# Patient Record
Sex: MALE | Race: WHITE | NOT HISPANIC OR LATINO | Employment: OTHER | URBAN - METROPOLITAN AREA
[De-identification: names, ages, dates, MRNs, and addresses within clinical notes are randomized per-mention and may not be internally consistent; named-entity substitution may affect disease eponyms.]

---

## 2017-05-28 ENCOUNTER — HOSPITAL ENCOUNTER (EMERGENCY)
Facility: CLINIC | Age: 82
Discharge: HOME | End: 2017-05-28
Attending: CLINICAL NURSE SPECIALIST | Admitting: CLINICAL NURSE SPECIALIST
Payer: MEDICARE

## 2017-05-28 VITALS
TEMPERATURE: 97.7 F | WEIGHT: 151 LBS | SYSTOLIC BLOOD PRESSURE: 156 MMHG | OXYGEN SATURATION: 97 % | BODY MASS INDEX: 22.36 KG/M2 | HEIGHT: 69 IN | HEART RATE: 77 BPM | DIASTOLIC BLOOD PRESSURE: 72 MMHG | RESPIRATION RATE: 20 BRPM
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
## 2017-05-28 PROCEDURE — 99282 EMERGENCY DEPT VISIT SF MDM: CPT

## 2017-05-28 RX ORDER — CEPHALEXIN 500 MG/1
500 CAPSULE ORAL 4 TIMES DAILY
Qty: 40 CAPSULE | Refills: 0 | Status: SHIPPED | OUTPATIENT
Start: 2017-05-28 | End: 2017-06-07

## 2017-05-28 ASSESSMENT — ENCOUNTER SYMPTOMS
NAUSEA: 0
FEVER: 0
WOUND: 1
VOMITING: 0

## 2017-05-28 NOTE — ED AVS SNAPSHOT
Emergency Department    [address] [place] [address]    Phone:  [phone]    Fax:  [phone]                                       Alec Boston Jr.   MRN: [id number]    Department:   Emergency Department   Date of Visit:  5/28/2017           After Visit Summary Signature Page     I have received my discharge instructions, and my questions have been answered. I have discussed any challenges I see with this plan with the nurse or doctor.    ..........................................................................................................................................  Patient/Patient Representative Signature      ..........................................................................................................................................  Patient Representative Print Name and Relationship to Patient    ..................................................               ................................................  Date                                            Time    ..........................................................................................................................................  Reviewed by Signature/Title    ...................................................              ..............................................  Date                                                            Time

## 2017-05-28 NOTE — ED PROVIDER NOTES
"  History     Chief Complaint:  Insect Bites     HPI   Alec Boston Jr. is a 82 year old male who presents to the emergency department today for evaluation of insect bites to upper extremities. Patient notes three different bug bites that he believes occurred yesterday and again today. One bite has become red and burst with drainage. Patient was recently working outside in the [employer] where he lives. Family friend expressed concern for tick bites and recommended that patient seek further ED evaluation. Patient states that wounds have actually improved in appearance and only itch intermittently. He denies fevers, nausea, or vomiting. No other concerns were voiced at this time.     Allergies:  No Known Drug Allergies    Medications:    The patient is currently on no regular medications.      Past Medical History:    Cholelithiasis  Glaucoma  Testicular cancer    Past Surgical History:    Orchiectomy  Limbal relaxing  Cholecystectomy  Laser selective trabeculoplasty    Family History:    History reviewed. No pertinent family history.     Social History:  The patient was accompanied to the ED by visitor.  Smoking Status: Negative  Alcohol Use: Positive  Marital Status:  Single [1]    Review of Systems   Constitutional: Negative for fever.   Gastrointestinal: Negative for nausea and vomiting.   Skin: Positive for wound.   All other systems reviewed and are negative.    Physical Exam   First Vitals:  BP: 156/72  Pulse: 77  Temp: 97.7  F (36.5  C)  Resp: 20  Height: 175.3 cm (5' 9\")  Weight: 68.5 kg (151 lb)  SpO2: 97 %      Physical Exam  Physical Exam   Constitutional: Pt appears well-developed and well-nourished. Non toxic appearing.   ENT: Oropharynx is moist.   Eyes: EOMs intact. Pupils are equal, round, and reactive to light.    Neurological: No focal deficits.   Skin: Skin is warm, dry. Red papule on the dorsal side of left index of MCP joint with no surrounding redness. Small open papule on left wrist on the volar " side without surrounding redness. Mid right forearm there is an insect bite with approximately 1 cm of localized redness and swelling without warmth    Emergency Department Course       Emergency Department Course:  Nursing notes and vitals reviewed.  I performed an exam of the patient as documented above.     At 1900 the patient was evaluated and we discussed plan of care. Symptomatic care was discussed for home.    I personally reviewed the treatment plan with the Patient and answered all related questions prior to discharge.    Impression & Plan      Medical Decision Making:  Alec Boston Jr. is a 82 year old male who presents for evaluation of skin redness. The history, physical exam and supporting data are consistent with insect bite and localized reaction, may be very early cellulitis. There do not appear at this time to be any complication including necrotizing fascitis, lymphangitis, lymphadenitis, abscess, osteomyelitis, sepsis, or shock. The patient is not immunosuppressed. Supportive outpatient management is indicated with antibiotics if worsening. Close follow-up of primary care physician to ensure no progression and rapid resolution. Area of cellulitis was outlined.     Diagnosis:    ICD-10-CM    1. Insect bite, initial encounter W57.XXXA        Disposition:   Discharged to home. Plan for follow up with Stewart Kim    Discharge Medications:  Discharge Medication List as of 5/28/2017  7:12 PM      START taking these medications    Details   cephALEXin (KEFLEX) 500 MG capsule Take 1 capsule (500 mg) by mouth 4 times daily for 10 days, Disp-40 capsule, R-0, Local Print             Scribe Disclosure:  STELLA, Brigido Lugo, am serving as a scribe at 6:59 PM on 5/28/2017 to document services personally performed by Iraida Andrade, NP,  based on my observations and the provider's statements to me.   EMERGENCY DEPARTMENT       Iraida Andrade, APRN CNP  05/28/17 2029

## 2017-05-29 NOTE — DISCHARGE INSTRUCTIONS
Insect Bite  Insects most often bite to protect themselves or their nests. Certain bugs, like fleas, bite to feed. In some cases, the actual bite causes no pain. An itchy red welt or swelling may develop at the site of the bite. Most insect bites do not cause illness. And the itching and swelling most often go away without treatment. However, an infection can develop if the bite is scratched and the skin broken. Rarely, a person may have an allergic reaction to an insect bite.  If a stinger is visible at the bite spot, remove it if possible, as this can decrease the amount of venom that gets into your body. Do not try to dig it out, as you may damage the skin and also increase the chance of infection.     To help reduce swelling and itching, apply ice or a cold pack wrapped in a thin towel.   Home care    Your health care provider may prescribe over-the-counter medicines to help relieve itching and swelling. Use each medicine according to the directions on the package. If the bite becomes infected, you will need an antibiotic. This may be in pill form taken by mouth or as an ointment or cream put directly on the skin. Be sure to use them exactly as prescribed.    Bite symptoms usually go away on their own within a week or two.    To help prevent infection, avoid scratching or picking at the bite.    To help relieve itching and swelling, apply ice wrapped in a thin towel to the bites. Do this for up to 10 minutes at a time. Avoid hot showers or baths as these tend to make itching worse.    An over-the-counter anti-itch medicine such as calamine lotion or an antihistamine cream may be helpful.    If you suspect you have insects in your home, talk to a licensed pest-control professional. He or she can inspect your home and tell you how to get rid of bugs safely.  Follow-up care  Follow up with your health care provider, or as advised.  Call 911  Call 911 if any of these occur:    Trouble breathing or  swallowing    Wheezing    Feeling like your throat is closing up    Fainting, loss of consciousness    Swelling around the face or mouth  When to seek medical advice  Call your health care provider right away if any of these occur:    Fever of 100.4 F (38 C) or higher    Signs of infection, such as increased swelling and pain, warmth, red streaks, or drainage from the skin    Signs of allergic reaction, such as hives, a spreading rash, throat itching    8757-9419 [employer]. [address] [place], [place] [id number]. All rights reserved. This information is not intended as a substitute for professional medical care. Always follow your healthcare professional's instructions.    Start antibiotics if redness is worsening outside of traced areas.  You may use over the counter hydrocortisone cream three times daily as needed for redness and itching.

## 2019-07-20 ENCOUNTER — HOSPITAL ENCOUNTER (EMERGENCY)
Facility: CLINIC | Age: 84
Discharge: HOME | End: 2019-07-20
Attending: EMERGENCY MEDICINE | Admitting: EMERGENCY MEDICINE
Payer: COMMERCIAL

## 2019-07-20 VITALS
BODY MASS INDEX: 23.34 KG/M2 | HEIGHT: 68 IN | DIASTOLIC BLOOD PRESSURE: 101 MMHG | HEART RATE: 89 BPM | WEIGHT: 154 LBS | SYSTOLIC BLOOD PRESSURE: 163 MMHG | RESPIRATION RATE: 16 BRPM | OXYGEN SATURATION: 95 % | TEMPERATURE: 98.3 F

## 2019-07-20 DIAGNOSIS — R04.0 EPISTAXIS: ICD-10-CM

## 2019-07-20 PROCEDURE — 99283 EMERGENCY DEPT VISIT LOW MDM: CPT

## 2019-07-20 PROCEDURE — 25000125 ZZHC RX 250: Performed by: EMERGENCY MEDICINE

## 2019-07-20 RX ORDER — TRANEXAMIC ACID 100 MG/ML
500 INJECTION, SOLUTION INTRAVENOUS ONCE
Status: COMPLETED | OUTPATIENT
Start: 2019-07-20 | End: 2019-07-20

## 2019-07-20 RX ADMIN — TRANEXAMIC ACID 500 MG: 100 INJECTION, SOLUTION INTRAVENOUS at 12:03

## 2019-07-20 ASSESSMENT — MIFFLIN-ST. JEOR: SCORE: 1368.04

## 2019-07-20 NOTE — ED AVS SNAPSHOT
Emergency Department  [address] [place] [address]  Phone:  [phone]  Fax:  [phone]                                    Alec Boston Jr.   MRN: [id number]    Department:   Emergency Department   Date of Visit:  7/20/2019           After Visit Summary Signature Page    I have received my discharge instructions, and my questions have been answered. I have discussed any challenges I see with this plan with the nurse or doctor.    ..........................................................................................................................................  Patient/Patient Representative Signature      ..........................................................................................................................................  Patient Representative Print Name and Relationship to Patient    ..................................................               ................................................  Date                                   Time    ..........................................................................................................................................  Reviewed by Signature/Title    ...................................................              ..............................................  Date                                               Time          22EPIC Rev 08/18

## 2019-07-20 NOTE — DISCHARGE INSTRUCTIONS
"If starts bleeding, make sure no clots then spray afrin in nose then put clamp on for 20-30 minutes. If still bleeding, then should return to ED but otherwise can stay home  Place vaseline on nose 1-2 times per day to keep moist for next week  Follow up with ENT if having recurrent nose bleeds  Don't blow your nose, pick your nose and hold in sneezes for next week    Discharge Instructions  Epistaxis    Today you were seen for a nosebleed.   Nosebleeds (the medical term is \"epistaxis\") are very common. Almost every person has had at least one in their lifetime.  Although the amount of blood loss can appear dramatic, nosebleeds rarely cause serious problems.  The most common causes are dry air or nose picking, but they also are common in people who have allergies, high blood pressure, or are on blood thinners (such as Coumadin, Aspirin or Plavix). If you or your child gets a nosebleed, the important thing is to know how to take care of it. With the right self-care, most nosebleeds will stop on their own.    Generally, every Emergency Department visit should have a follow-up clinic visit with either a primary or a specialty clinic/provider. Please follow-up as instructed by your emergency provider today.    Return to the Emergency Department if:  Your nose is bleeding a very large amount of blood and you are unable to stop it.  You get very pale, faint, or tired.  You cannot get the bleeding to stop after following these instructions.    Treatment:  Your provider may tell you to use a decongestant nose spray, like Afrin  (oxymetazoline), in both nostrils in the morning and at night for the next three days. Do not use this medicine for more than three days at a time.  If you do, it will cause nasal congestion.   Use a moisturizer. A small amount of Vaseline  to the inside of your nostrils for moisture before bed is one option. There are nasal sprays available over-the-counter for this purpose as well.  Using a " humidifier in your bedroom or home will help as well when the air is dry.  For the next three days, do not blow your nose or put anything in your nose. You may sniffle, or dab the outside of your nose.  Do not bend with your head below your waist for the next three days. Do not lift anything so heavy that you have to strain.   If you received nasal packing, please do not remove the packing until seen by an Ear, Nose, and Throat (ENT) specialist.  If antibiotics have been given with the packing, please take as directed.    If your nose starts to bleed again:  Blow your nose to get rid of some of the clots that have formed inside your nostrils. This may increase the bleeding temporarily, but that is okay.  Spray decongestant nose spray (like Afrin ) into both nostrils to constrict the blood vessels.  Sit or stand while bending forward slightly at the waist. Do not lie down or tilt your head back. This may cause you to swallow blood and can lead to vomiting.   the soft part of BOTH nostrils at the bottom of your nose and squeeze your nose closed for at least 5 minutes (for children) or 10 to 15 minutes (for adults). Use a clock to time yourself. Do not release the pressure every so often to check whether the bleeding has stopped. Many people hurt their chances of stopping the bleeding by releasing the pressure too soon or too often.    If you follow the steps outlined above, and your nose continues to bleed, repeat all the steps once more. Apply pressure for a total of at least 30 minutes. If you continue to bleed even then, seek medical attention.  If you were given a prescription for medicine here today, be sure to read all of the information (including the package insert) that comes with your prescription.  This will include important information about the medicine, its side effects, and any warnings that you need to know about.  The pharmacist who fills the prescription can provide more information and answer  questions you may have about the medicine.  If you have questions or concerns that the pharmacist cannot address, please call or return to the Emergency Department.   Remember that you can always come back to the Emergency Department if you are not able to see your regular provider in the amount of time listed above, if you get any new symptoms, or if there is anything that worries you.

## 2019-07-20 NOTE — ED PROVIDER NOTES
"  History     Chief Complaint:  Epistaxis     HPI   Alec Boston Jr. is a 84 year old, otherwise healthy male who presents for evaluation of epistaxis. This morning, around 0300, he reports waking up with epistaxis, and due to the extent of the bleeding, EMS was called. The paramedics were able to stop the bleeding with a clamp and the patient was able to go back to sleep. When he woke up this morning, he reports taking the clamp off and the bleeding started up again soon after, thus prompting presentation. Here, he reports the bleeding has stopped with the clamp on. When it is actively bleeding, he states it seems to be coming from both nostrils, but the left seems to have a greater flow. He states the blood has been going down his throat and he noted some darker stools this morning. He also reports coughing up \"globulars\" of blood. He denies any recent illness, dry nose, or anticoagulation.     Allergies:  NKDA    Medications:    The patient is currently on no regular medications.    Past Medical History:    Cholelithiasis  Glaucoma  Testicular cancer    Past Surgical History:    Orchiectomy  Keratotomy arcuate, limbal incision relaxing  Cholecystectomy  ORIF jaw  Trabeculoplasty - Left  Phacoemulsification clear cornea w/ standard IOL implant - Left    Family History:    No past pertinent family history.    Social History:  Marital Status:  Single [1]  Partner, Bill, at bedside.   Negative for tobacco use.  Positive for alcohol use.   Negative for drug use.      Review of Systems   HENT: Positive for congestion and nosebleeds.    All other systems reviewed and are negative.    Physical Exam     Patient Vitals for the past 24 hrs:   BP Temp Temp src Heart Rate Resp SpO2 Height Weight   07/20/19 1044 (!) 169/91 98.3  F (36.8  C) Oral 95 16 95 % 1.727 m (5' 8\") 69.9 kg (154 lb)      Physical Exam  General: Sitting up in bed  Eyes:  The pupils are equal and round    Conjunctivae and sclerae are normal  ENT:    No " bleeding from right nare; clot in left nare; no apparent active bleeding  Neck:  Normal range of motion  CV:  Regular rate and rhythm    Skin warm and well perfused   Resp:  Non labored breathing on room air  MS:  Normal muscular tone  Skin:  No rash or acute skin lesions noted  Neuro:   Awake, alert.      Speech is normal and fluent.    Face is symmetric.     Moves all extremities equally  Psych: Normal affect.  Appropriate interactions.    Emergency Department Course   Interventions:  1203 tranexamic acid, 500 mg spray, Both nostrils    Emergency Department Course:  Nursing notes and vitals reviewed.   1120: I performed an exam of the patient as documented above. I removed the clamp.      1155: I rechecked the patient; I also removed the clot and applied TXA to the nare as well as jenni and there was no recurrent bleeding.     1252: I rechecked the patient and the bleeding has not restarted. I discussed discharge instructions at this time.     I personally answered all related questions prior to discharge.    Impression & Plan      Medical Decision Making:  Alec Boston JrShelly is a 84 year old male who presents for evaluation of nasal bleeding and epistaxis.  The bleeding was controlled with interventions in the ED and therefore supportive outpatient management is indicated. There was no indication for cauterization or packing. Close follow-up with ENT as needed. There are no signs of coagulopathy causing the bleeding or a general medical condition (thrombocytopenia, DIC, leukemia, etc) causing the bleeding today. All questions and concerns were answered. Discussed ways to stop bleeding at home.     Diagnosis:    ICD-10-CM    1. Epistaxis R04.0        Disposition:  discharged to home    Scribe Disclosure:  I, Radha Saucedo, am serving as a scribe on 7/20/2019 at 11:16 AM to personally document services performed by Octavia Vila MD based on my observations and the provider's statements to me.      Radha  Lewis  7/20/2019    EMERGENCY DEPARTMENT       Octavia Vila MD  07/20/19 7894

## 2019-12-06 ENCOUNTER — HOSPITAL ENCOUNTER (INPATIENT)
Facility: CLINIC | Age: 84
LOS: 2 days | Discharge: HOME | DRG: 981 | End: 2019-12-09
Attending: EMERGENCY MEDICINE | Admitting: INTERNAL MEDICINE
Payer: COMMERCIAL

## 2019-12-06 DIAGNOSIS — I26.99 BILATERAL PULMONARY EMBOLISM (H): ICD-10-CM

## 2019-12-06 DIAGNOSIS — J06.9 UPPER RESPIRATORY TRACT INFECTION, UNSPECIFIED TYPE: ICD-10-CM

## 2019-12-06 DIAGNOSIS — I26.99 OTHER ACUTE PULMONARY EMBOLISM, UNSPECIFIED WHETHER ACUTE COR PULMONALE PRESENT (H): ICD-10-CM

## 2019-12-06 DIAGNOSIS — I82.402 DEEP VEIN THROMBOSIS (DVT) OF LEFT LOWER EXTREMITY, UNSPECIFIED CHRONICITY, UNSPECIFIED VEIN (H): Primary | ICD-10-CM

## 2019-12-06 PROCEDURE — 99285 EMERGENCY DEPT VISIT HI MDM: CPT | Mod: 25

## 2019-12-06 RX ORDER — PREDNISONE 5 MG/1
10 TABLET ORAL DAILY
COMMUNITY
Start: 2019-10-22 | End: 2021-12-29

## 2019-12-06 RX ORDER — HYDROXYCHLOROQUINE SULFATE 200 MG/1
TABLET, FILM COATED ORAL DAILY
COMMUNITY
Start: 2019-10-22

## 2019-12-06 RX ORDER — TRAVOPROST OPHTHALMIC SOLUTION 0.04 MG/ML
1 SOLUTION OPHTHALMIC EVERY EVENING
COMMUNITY
Start: 2019-05-31

## 2019-12-06 RX ORDER — SODIUM CHLORIDE 9 MG/ML
1000 INJECTION, SOLUTION INTRAVENOUS CONTINUOUS
Status: DISCONTINUED | OUTPATIENT
Start: 2019-12-07 | End: 2019-12-07

## 2019-12-07 ENCOUNTER — APPOINTMENT (OUTPATIENT)
Dept: CT IMAGING | Facility: CLINIC | Age: 84
DRG: 981 | End: 2019-12-07
Attending: EMERGENCY MEDICINE
Payer: COMMERCIAL

## 2019-12-07 ENCOUNTER — APPOINTMENT (OUTPATIENT)
Dept: ULTRASOUND IMAGING | Facility: CLINIC | Age: 84
DRG: 981 | End: 2019-12-07
Attending: INTERNAL MEDICINE
Payer: COMMERCIAL

## 2019-12-07 PROBLEM — I26.99 PULMONARY EMBOLI (H): Status: ACTIVE | Noted: 2019-12-07

## 2019-12-07 PROBLEM — I26.99 BILATERAL PULMONARY EMBOLISM (H): Status: ACTIVE | Noted: 2019-12-07

## 2019-12-07 LAB
ALBUMIN SERPL-MCNC: 3.7 G/DL (ref 3.4–5)
ALBUMIN UR-MCNC: 30 MG/DL
ALP SERPL-CCNC: 48 U/L (ref 40–150)
ALT SERPL W P-5'-P-CCNC: 23 U/L (ref 0–70)
ANION GAP SERPL CALCULATED.3IONS-SCNC: 6 MMOL/L (ref 3–14)
APPEARANCE UR: CLEAR
AST SERPL W P-5'-P-CCNC: 11 U/L (ref 0–45)
BACTERIA #/AREA URNS HPF: ABNORMAL /HPF
BASOPHILS # BLD AUTO: 0 10E9/L (ref 0–0.2)
BASOPHILS # BLD AUTO: 0 10E9/L (ref 0–0.2)
BASOPHILS NFR BLD AUTO: 0.1 %
BASOPHILS NFR BLD AUTO: 0.1 %
BILIRUB SERPL-MCNC: 0.9 MG/DL (ref 0.2–1.3)
BILIRUB UR QL STRIP: NEGATIVE
BUN SERPL-MCNC: 23 MG/DL (ref 7–30)
CALCIUM SERPL-MCNC: 9.3 MG/DL (ref 8.5–10.1)
CHLORIDE SERPL-SCNC: 104 MMOL/L (ref 94–109)
CO2 SERPL-SCNC: 25 MMOL/L (ref 20–32)
COLOR UR AUTO: YELLOW
CREAT BLD-MCNC: 1.2 MG/DL (ref 0.66–1.25)
CREAT SERPL-MCNC: 1.13 MG/DL (ref 0.66–1.25)
D DIMER PPP FEU-MCNC: 9.2 UG/ML FEU (ref 0–0.5)
DIFFERENTIAL METHOD BLD: ABNORMAL
DIFFERENTIAL METHOD BLD: ABNORMAL
EOSINOPHIL # BLD AUTO: 0 10E9/L (ref 0–0.7)
EOSINOPHIL # BLD AUTO: 0 10E9/L (ref 0–0.7)
EOSINOPHIL NFR BLD AUTO: 0 %
EOSINOPHIL NFR BLD AUTO: 0 %
ERYTHROCYTE [DISTWIDTH] IN BLOOD BY AUTOMATED COUNT: 13 % (ref 10–15)
ERYTHROCYTE [DISTWIDTH] IN BLOOD BY AUTOMATED COUNT: 13 % (ref 10–15)
FLUAV+FLUBV AG SPEC QL: NEGATIVE
FLUAV+FLUBV AG SPEC QL: NEGATIVE
GFR SERPL CREATININE-BSD FRML MDRD: 58 ML/MIN/{1.73_M2}
GFR SERPL CREATININE-BSD FRML MDRD: 59 ML/MIN/{1.73_M2}
GLUCOSE SERPL-MCNC: 99 MG/DL (ref 70–99)
GLUCOSE UR STRIP-MCNC: NEGATIVE MG/DL
HCT VFR BLD AUTO: 39.1 % (ref 40–53)
HCT VFR BLD AUTO: 45.6 % (ref 40–53)
HGB BLD-MCNC: 12.9 G/DL (ref 13.3–17.7)
HGB BLD-MCNC: 13.3 G/DL (ref 13.3–17.7)
HGB BLD-MCNC: 15.5 G/DL (ref 13.3–17.7)
HGB UR QL STRIP: ABNORMAL
IMM GRANULOCYTES # BLD: 0 10E9/L (ref 0–0.4)
IMM GRANULOCYTES # BLD: 0 10E9/L (ref 0–0.4)
IMM GRANULOCYTES NFR BLD: 0.2 %
IMM GRANULOCYTES NFR BLD: 0.3 %
INR PPP: 0.97 (ref 0.86–1.14)
KETONES UR STRIP-MCNC: NEGATIVE MG/DL
LEUKOCYTE ESTERASE UR QL STRIP: NEGATIVE
LIPASE SERPL-CCNC: 191 U/L (ref 73–393)
LYMPHOCYTES # BLD AUTO: 1.2 10E9/L (ref 0.8–5.3)
LYMPHOCYTES # BLD AUTO: 1.5 10E9/L (ref 0.8–5.3)
LYMPHOCYTES NFR BLD AUTO: 10.5 %
LYMPHOCYTES NFR BLD AUTO: 17.1 %
MCH RBC QN AUTO: 30.9 PG (ref 26.5–33)
MCH RBC QN AUTO: 31.7 PG (ref 26.5–33)
MCHC RBC AUTO-ENTMCNC: 33 G/DL (ref 31.5–36.5)
MCHC RBC AUTO-ENTMCNC: 34 G/DL (ref 31.5–36.5)
MCV RBC AUTO: 93 FL (ref 78–100)
MCV RBC AUTO: 94 FL (ref 78–100)
MONOCYTES # BLD AUTO: 0.9 10E9/L (ref 0–1.3)
MONOCYTES # BLD AUTO: 1.1 10E9/L (ref 0–1.3)
MONOCYTES NFR BLD AUTO: 10 %
MONOCYTES NFR BLD AUTO: 10.5 %
MUCOUS THREADS #/AREA URNS LPF: PRESENT /LPF
NEUTROPHILS # BLD AUTO: 6.2 10E9/L (ref 1.6–8.3)
NEUTROPHILS # BLD AUTO: 8.7 10E9/L (ref 1.6–8.3)
NEUTROPHILS NFR BLD AUTO: 72.1 %
NEUTROPHILS NFR BLD AUTO: 79.1 %
NITRATE UR QL: NEGATIVE
NRBC # BLD AUTO: 0 10*3/UL
NRBC # BLD AUTO: 0 10*3/UL
NRBC BLD AUTO-RTO: 0 /100
NRBC BLD AUTO-RTO: 0 /100
PH UR STRIP: 5 PH (ref 5–7)
PLATELET # BLD AUTO: 151 10E9/L (ref 150–450)
PLATELET # BLD AUTO: 192 10E9/L (ref 150–450)
POTASSIUM SERPL-SCNC: 4.2 MMOL/L (ref 3.4–5.3)
PROT SERPL-MCNC: 8 G/DL (ref 6.8–8.8)
RBC # BLD AUTO: 4.18 10E12/L (ref 4.4–5.9)
RBC # BLD AUTO: 4.89 10E12/L (ref 4.4–5.9)
RBC #/AREA URNS AUTO: 4 /HPF (ref 0–2)
SODIUM SERPL-SCNC: 135 MMOL/L (ref 133–144)
SOURCE: ABNORMAL
SP GR UR STRIP: 1.02 (ref 1–1.03)
SPECIMEN SOURCE: NORMAL
TROPONIN I SERPL-MCNC: <0.015 UG/L (ref 0–0.04)
UROBILINOGEN UR STRIP-MCNC: NORMAL MG/DL (ref 0–2)
WBC # BLD AUTO: 11 10E9/L (ref 4–11)
WBC # BLD AUTO: 8.6 10E9/L (ref 4–11)
WBC #/AREA URNS AUTO: 1 /HPF (ref 0–5)

## 2019-12-07 PROCEDURE — 99207 ZZC CDG-CODE CATEGORY CHANGED: CPT | Performed by: INTERNAL MEDICINE

## 2019-12-07 PROCEDURE — 12000000 ZZH R&B MED SURG/OB

## 2019-12-07 PROCEDURE — 36415 COLL VENOUS BLD VENIPUNCTURE: CPT | Performed by: INTERNAL MEDICINE

## 2019-12-07 PROCEDURE — 25000128 H RX IP 250 OP 636: Performed by: EMERGENCY MEDICINE

## 2019-12-07 PROCEDURE — 74177 CT ABD & PELVIS W/CONTRAST: CPT

## 2019-12-07 PROCEDURE — 71275 CT ANGIOGRAPHY CHEST: CPT

## 2019-12-07 PROCEDURE — 93970 EXTREMITY STUDY: CPT

## 2019-12-07 PROCEDURE — 96361 HYDRATE IV INFUSION ADD-ON: CPT

## 2019-12-07 PROCEDURE — 85018 HEMOGLOBIN: CPT | Performed by: INTERNAL MEDICINE

## 2019-12-07 PROCEDURE — 87040 BLOOD CULTURE FOR BACTERIA: CPT | Performed by: INTERNAL MEDICINE

## 2019-12-07 PROCEDURE — 25800030 ZZH RX IP 258 OP 636: Performed by: EMERGENCY MEDICINE

## 2019-12-07 PROCEDURE — 99223 1ST HOSP IP/OBS HIGH 75: CPT | Mod: AI | Performed by: INTERNAL MEDICINE

## 2019-12-07 PROCEDURE — 87040 BLOOD CULTURE FOR BACTERIA: CPT | Performed by: EMERGENCY MEDICINE

## 2019-12-07 PROCEDURE — 25000132 ZZH RX MED GY IP 250 OP 250 PS 637: Performed by: INTERNAL MEDICINE

## 2019-12-07 PROCEDURE — 81001 URINALYSIS AUTO W/SCOPE: CPT | Performed by: EMERGENCY MEDICINE

## 2019-12-07 PROCEDURE — G0378 HOSPITAL OBSERVATION PER HR: HCPCS

## 2019-12-07 PROCEDURE — 80053 COMPREHEN METABOLIC PANEL: CPT | Performed by: EMERGENCY MEDICINE

## 2019-12-07 PROCEDURE — 87804 INFLUENZA ASSAY W/OPTIC: CPT | Performed by: EMERGENCY MEDICINE

## 2019-12-07 PROCEDURE — 25000128 H RX IP 250 OP 636: Performed by: INTERNAL MEDICINE

## 2019-12-07 PROCEDURE — 96376 TX/PRO/DX INJ SAME DRUG ADON: CPT

## 2019-12-07 PROCEDURE — 99207 ZZC APP CREDIT; MD BILLING SHARED VISIT: CPT | Performed by: INTERNAL MEDICINE

## 2019-12-07 PROCEDURE — 25000131 ZZH RX MED GY IP 250 OP 636 PS 637: Performed by: INTERNAL MEDICINE

## 2019-12-07 PROCEDURE — 85025 COMPLETE CBC W/AUTO DIFF WBC: CPT | Performed by: EMERGENCY MEDICINE

## 2019-12-07 PROCEDURE — 96375 TX/PRO/DX INJ NEW DRUG ADDON: CPT

## 2019-12-07 PROCEDURE — 25000125 ZZHC RX 250: Performed by: EMERGENCY MEDICINE

## 2019-12-07 PROCEDURE — 85379 FIBRIN DEGRADATION QUANT: CPT | Performed by: EMERGENCY MEDICINE

## 2019-12-07 PROCEDURE — 85025 COMPLETE CBC W/AUTO DIFF WBC: CPT | Performed by: INTERNAL MEDICINE

## 2019-12-07 PROCEDURE — 82565 ASSAY OF CREATININE: CPT

## 2019-12-07 PROCEDURE — 87086 URINE CULTURE/COLONY COUNT: CPT | Performed by: EMERGENCY MEDICINE

## 2019-12-07 PROCEDURE — 85610 PROTHROMBIN TIME: CPT | Performed by: EMERGENCY MEDICINE

## 2019-12-07 PROCEDURE — 96374 THER/PROPH/DIAG INJ IV PUSH: CPT | Mod: 59

## 2019-12-07 PROCEDURE — 83690 ASSAY OF LIPASE: CPT | Performed by: EMERGENCY MEDICINE

## 2019-12-07 PROCEDURE — 84484 ASSAY OF TROPONIN QUANT: CPT | Performed by: EMERGENCY MEDICINE

## 2019-12-07 PROCEDURE — C9113 INJ PANTOPRAZOLE SODIUM, VIA: HCPCS | Performed by: INTERNAL MEDICINE

## 2019-12-07 RX ORDER — IOPAMIDOL 755 MG/ML
78 INJECTION, SOLUTION INTRAVASCULAR ONCE
Status: COMPLETED | OUTPATIENT
Start: 2019-12-07 | End: 2019-12-07

## 2019-12-07 RX ORDER — LIDOCAINE 40 MG/G
CREAM TOPICAL
Status: DISCONTINUED | OUTPATIENT
Start: 2019-12-07 | End: 2019-12-09 | Stop reason: HOSPADM

## 2019-12-07 RX ORDER — ACETAMINOPHEN 650 MG/1
650 SUPPOSITORY RECTAL EVERY 4 HOURS PRN
Status: DISCONTINUED | OUTPATIENT
Start: 2019-12-07 | End: 2019-12-09 | Stop reason: HOSPADM

## 2019-12-07 RX ORDER — ONDANSETRON 4 MG/1
4 TABLET, ORALLY DISINTEGRATING ORAL EVERY 6 HOURS PRN
Status: DISCONTINUED | OUTPATIENT
Start: 2019-12-07 | End: 2019-12-09 | Stop reason: HOSPADM

## 2019-12-07 RX ORDER — HYDROXYCHLOROQUINE SULFATE 200 MG/1
400 TABLET, FILM COATED ORAL DAILY
Status: DISCONTINUED | OUTPATIENT
Start: 2019-12-07 | End: 2019-12-09 | Stop reason: HOSPADM

## 2019-12-07 RX ORDER — PIPERACILLIN SODIUM, TAZOBACTAM SODIUM 3; .375 G/15ML; G/15ML
3.38 INJECTION, POWDER, LYOPHILIZED, FOR SOLUTION INTRAVENOUS EVERY 6 HOURS
Status: DISCONTINUED | OUTPATIENT
Start: 2019-12-07 | End: 2019-12-09

## 2019-12-07 RX ORDER — ONDANSETRON 2 MG/ML
4 INJECTION INTRAMUSCULAR; INTRAVENOUS EVERY 6 HOURS PRN
Status: DISCONTINUED | OUTPATIENT
Start: 2019-12-07 | End: 2019-12-09 | Stop reason: HOSPADM

## 2019-12-07 RX ORDER — SODIUM CHLORIDE 9 MG/ML
1000 INJECTION, SOLUTION INTRAVENOUS CONTINUOUS
Status: DISCONTINUED | OUTPATIENT
Start: 2019-12-07 | End: 2019-12-07

## 2019-12-07 RX ORDER — NALOXONE HYDROCHLORIDE 0.4 MG/ML
.1-.4 INJECTION, SOLUTION INTRAMUSCULAR; INTRAVENOUS; SUBCUTANEOUS
Status: DISCONTINUED | OUTPATIENT
Start: 2019-12-07 | End: 2019-12-09 | Stop reason: HOSPADM

## 2019-12-07 RX ORDER — ACETAMINOPHEN 325 MG/1
650 TABLET ORAL EVERY 4 HOURS PRN
Status: DISCONTINUED | OUTPATIENT
Start: 2019-12-07 | End: 2019-12-09 | Stop reason: HOSPADM

## 2019-12-07 RX ORDER — TRAVOPROST OPHTHALMIC SOLUTION 0.04 MG/ML
1 SOLUTION OPHTHALMIC EVERY EVENING
Status: DISCONTINUED | OUTPATIENT
Start: 2019-12-07 | End: 2019-12-09 | Stop reason: HOSPADM

## 2019-12-07 RX ORDER — AMPICILLIN AND SULBACTAM 2; 1 G/1; G/1
3 INJECTION, POWDER, FOR SOLUTION INTRAMUSCULAR; INTRAVENOUS EVERY 6 HOURS
Status: DISCONTINUED | OUTPATIENT
Start: 2019-12-07 | End: 2019-12-07

## 2019-12-07 RX ORDER — IOPAMIDOL 755 MG/ML
61 INJECTION, SOLUTION INTRAVASCULAR ONCE
Status: COMPLETED | OUTPATIENT
Start: 2019-12-07 | End: 2019-12-07

## 2019-12-07 RX ORDER — HYDROMORPHONE HYDROCHLORIDE 1 MG/ML
0.2 INJECTION, SOLUTION INTRAMUSCULAR; INTRAVENOUS; SUBCUTANEOUS
Status: DISCONTINUED | OUTPATIENT
Start: 2019-12-07 | End: 2019-12-09 | Stop reason: HOSPADM

## 2019-12-07 RX ORDER — PREDNISONE 5 MG/1
5 TABLET ORAL DAILY
Status: DISCONTINUED | OUTPATIENT
Start: 2019-12-07 | End: 2019-12-09 | Stop reason: HOSPADM

## 2019-12-07 RX ORDER — OXYCODONE HYDROCHLORIDE 5 MG/1
5-10 TABLET ORAL
Status: DISCONTINUED | OUTPATIENT
Start: 2019-12-07 | End: 2019-12-09 | Stop reason: HOSPADM

## 2019-12-07 RX ORDER — HEPARIN SODIUM 10000 [USP'U]/100ML
850 INJECTION, SOLUTION INTRAVENOUS CONTINUOUS
Status: DISCONTINUED | OUTPATIENT
Start: 2019-12-07 | End: 2019-12-09

## 2019-12-07 RX ORDER — KETOROLAC TROMETHAMINE 15 MG/ML
15 INJECTION, SOLUTION INTRAMUSCULAR; INTRAVENOUS ONCE
Status: COMPLETED | OUTPATIENT
Start: 2019-12-07 | End: 2019-12-07

## 2019-12-07 RX ADMIN — IOPAMIDOL 78 ML: 755 INJECTION, SOLUTION INTRAVENOUS at 00:46

## 2019-12-07 RX ADMIN — PREDNISONE 5 MG: 5 TABLET ORAL at 09:44

## 2019-12-07 RX ADMIN — KETOROLAC TROMETHAMINE 15 MG: 15 INJECTION, SOLUTION INTRAMUSCULAR; INTRAVENOUS at 01:46

## 2019-12-07 RX ADMIN — ACETAMINOPHEN 650 MG: 325 TABLET, FILM COATED ORAL at 22:47

## 2019-12-07 RX ADMIN — AMPICILLIN SODIUM AND SULBACTAM SODIUM 3 G: 2; 1 INJECTION, POWDER, FOR SOLUTION INTRAMUSCULAR; INTRAVENOUS at 14:38

## 2019-12-07 RX ADMIN — PIPERACILLIN AND TAZOBACTAM 3.38 G: 3; .375 INJECTION, POWDER, LYOPHILIZED, FOR SOLUTION INTRAVENOUS at 17:11

## 2019-12-07 RX ADMIN — PANTOPRAZOLE SODIUM 40 MG: 40 INJECTION, POWDER, FOR SOLUTION INTRAVENOUS at 09:43

## 2019-12-07 RX ADMIN — AMPICILLIN SODIUM AND SULBACTAM SODIUM 3 G: 2; 1 INJECTION, POWDER, FOR SOLUTION INTRAMUSCULAR; INTRAVENOUS at 09:44

## 2019-12-07 RX ADMIN — SODIUM CHLORIDE 1000 ML: 9 INJECTION, SOLUTION INTRAVENOUS at 02:14

## 2019-12-07 RX ADMIN — HYDROXYCHLOROQUINE SULFATE 400 MG: 200 TABLET ORAL at 17:10

## 2019-12-07 RX ADMIN — HEPARIN SODIUM 1250 UNITS/HR: 10000 INJECTION, SOLUTION INTRAVENOUS at 21:49

## 2019-12-07 RX ADMIN — TRAVOPROST 1 DROP: 0.04 SOLUTION/ DROPS OPHTHALMIC at 20:10

## 2019-12-07 RX ADMIN — SODIUM CHLORIDE 87 ML: 9 INJECTION, SOLUTION INTRAVENOUS at 03:51

## 2019-12-07 RX ADMIN — SODIUM CHLORIDE 1000 ML: 9 INJECTION, SOLUTION INTRAVENOUS at 00:04

## 2019-12-07 RX ADMIN — IOPAMIDOL 61 ML: 755 INJECTION, SOLUTION INTRAVENOUS at 03:50

## 2019-12-07 RX ADMIN — PIPERACILLIN AND TAZOBACTAM 3.38 G: 3; .375 INJECTION, POWDER, LYOPHILIZED, FOR SOLUTION INTRAVENOUS at 22:40

## 2019-12-07 RX ADMIN — SODIUM CHLORIDE 64 ML: 9 INJECTION, SOLUTION INTRAVENOUS at 00:46

## 2019-12-07 RX ADMIN — SODIUM CHLORIDE 1000 ML: 9 INJECTION, SOLUTION INTRAVENOUS at 06:44

## 2019-12-07 RX ADMIN — RIVAROXABAN 15 MG: 15 TABLET, FILM COATED ORAL at 09:44

## 2019-12-07 ASSESSMENT — ACTIVITIES OF DAILY LIVING (ADL)
ADLS_ACUITY_SCORE: 14
ADLS_ACUITY_SCORE: 14

## 2019-12-07 ASSESSMENT — MIFFLIN-ST. JEOR: SCORE: 1376.98

## 2019-12-07 NOTE — CONSULTS
IR NOTE:     I was consulted about Alec RAÚL Boston Jr. A 84 year old male with history of PE and extensive LLE DVT for possible mechanical thrombectomy of his left lower extremity DVT. The patient was eating, so the decision was made to do the procedure in the morning on 12/8/2019.     The patient will need to be made NPO no later than 4 am on 12/8/2019.     Please call with any questions.     Solange Mak, DO  Pager: [phone]  Interventional Radiology, [facility].

## 2019-12-07 NOTE — PLAN OF CARE
Pt arrived to unit at 0620. A+OX4, VSS on RA ex hypertension. Denies pain currently. Plan for echocardiogram today.

## 2019-12-07 NOTE — PROGRESS NOTES
RECEIVING UNIT ED HANDOFF REVIEW    ED Nurse Handoff Report was reviewed by: Rafat Costello RN on December 7, 2019 at 5:56 AM

## 2019-12-07 NOTE — PHARMACY-ADMISSION MEDICATION HISTORY
Pharmacy Medication History  Admission medication history interview status for the 12/6/2019  admission is complete. See EPIC admission navigator for prior to admission medications     Medication history sources: Patient  Medication history source reliability: Good  Adherence assessment: Not Assessed     Significant changes made to the medication list:  Patient reported he is not taking timolol eye drops anymore.       Additional medication history information:   Patient was not sure of the doses. Called [facility] and confirmed doses for prednisone and hydrochloroquine.     Medication reconciliation completed by provider prior to medication history? No    Time spent in this activity: 15 minutes       Prior to Admission medications    Medication Sig Last Dose Taking? Auth Provider   hydroxychloroquine (PLAQUENIL) 200 MG tablet Take 400 mg by mouth daily  12/6/2019 at Unknown time Yes Reported, Patient   predniSONE (DELTASONE) 5 MG tablet Take 10 mg by mouth daily  12/6/2019 at Unknown time Yes Reported, Patient   travoprost SIENA FREE (TRAVATAN Z) 0.004 % ophthalmic solution Place 1 drop into both eyes every evening  12/5/2019 at Unknown time Yes Reported, Patient     Corie Said   Student Pharmacist

## 2019-12-07 NOTE — ED PROVIDER NOTES
History     Chief Complaint:  Abdominal Pain    HPI   Alec Boston Jr. is a 84 year old male who presents with abdominal pain. The patient states that his pain started in the left lower quadrant this morning. He states that the pain was initially uncomfortable. He states that this evening he went to a concert and after the concert he was walking 2 blocks to his car and the pain suddenly became a 10/10. He states that the pain since that time has been slowly improving and states as he was walking down the hallway in the ED it almost completely resolved. The patient denies a previous history of similar pain. He denies a history of kidney stones. The patient has had a cholecystectomy and left orchiectomy decades ago. The patient denies chest pain, cough, fever, or leg swelling. Of note, he reports that his bowel movements have been more frequent recently which he thinks might be related to the Plaquenil he recently started. The patient also reports he is on his third round of a prednisone taper as his hands have not been working for several months; he has seen 6 doctors for this and expresses frustration that a lack of consensus treatment, though most recently he saw a rheumatologist and feels that he is improving.  He went to [place] and returned in mid October.  No history of DVT or PE.  He noticed some streaks of blood in his sputum earlier today.  He is not on blood thinners and has no history of CNS or GI bleeding.    Allergies:  No Known Allergies     Medications:    Plaquenil  prednisone    Past Medical History:    Cholelithiasis  Glaucoma   Testicular cancer    Past Surgical History:    Orchiectomy  Incision limbal relaxing, keratotomy arcuate  Jaw orif  Laparoscopic cholecystectomy  Laser selective trabeculoplasty  Phacoemulsification clear cornea with standard intraocular lens implant     Family History:   History reviewed. No pertinent family history.    Social History:  The patient was accompanied to the  ED by partner.  Smoking Status: Never Smoker  Alcohol Use: Yes  Drug Use: No  PCP: Stewart Kim      Review of Systems   All other systems reviewed and are negative.    Physical Exam     Patient Vitals for the past 24 hrs:   BP Temp Temp src Pulse Heart Rate Resp SpO2   12/07/19 0624 (!) 162/80 97.9  F (36.6  C) Oral -- 91 16 98 %   12/07/19 0500 (!) 149/76 -- -- 89 90 14 93 %   12/07/19 0230 (!) 144/75 -- -- 98 -- -- 91 %   12/07/19 0200 (!) 148/78 -- -- 102 -- -- 91 %   12/07/19 0145 -- 100.1  F (37.8  C) Oral -- -- -- --   12/07/19 0130 (!) 141/105 -- -- 117 -- -- 93 %   12/07/19 0100 (!) 159/85 -- -- 115 -- -- 92 %   12/07/19 0030 (!) 160/84 -- -- 110 -- -- 94 %   12/07/19 0000 (!) 142/81 -- -- -- -- -- 95 %   12/06/19 2329 (!) 151/79 97.8  F (36.6  C) -- 122 -- 18 97 %     Physical Exam  General: Nontoxic-appearing male sitting upright in room 7, male partner at bedside  HENT: mucous membranes moist  CV: tachycardic rate, regular rhythm, no murmur audible, no LE edema, negative Nenita's bilaterally  Resp: clear throughout, normal effort, no crackles or wheezing  GI: abdomen soft and mildly tender in LUQ, no guarding, no palpable masses  MSK:   Thoracic spine: nontender, no CVAT  Lumbar spine: nontender  Pelvis stable.  : nontender external genitalia  Skin: appropriately warm and dry, no erythema/ecchymosis/vesicles to back  Neuro: alert, clear speech, oriented, ambulatory  Psych: pleasant, cooperative    Emergency Department Course   Imaging:  Radiology findings were communicated with the patient who voiced understanding of the findings.    CT Abdomen Pelvis w Contrast  1.  Bilateral lower lobe infiltrates concerning for pneumonia.  2.  Small bilateral renal cysts. No evidence for hydronephrosis or findings to account for the patient's left flank pain.  3.  Sigmoid diverticulosis.  Reading per radiology    CT Chest pulmonary embolism   1.  Bilateral pulmonary emboli.  2.  Probable developing left lower  lobe pulmonary infarct. Small left pleural effusion.  3.  Large left thyroid nodule.    [Critical Result: Pulmonary embolism]    Finding was identified on 12/07/2019, 3:59 AM.     Dr. Nash was contacted by me on 12/07/2019 at 4:05 AM and verbalized understanding of the critical result.   Reading per radiology     Laboratory:  Laboratory findings were communicated with the patient who voiced understanding of the findings.    UA with microscopic: blood moderate, protein albumin 30(H), RBC 4(H), bacteria few, mucous present o/w WNL  Urine culture aerobic bacteria pending    Influenza A/B antigen: both negative     Creatinine POCT: creatinine 1.2, GFR estimate 58(L)    CBC: WBC 11.0, HGB 15.5,   CMP: GFR estimate 59(L) o/w WNL (Creatinine 1.13)  Lipase: 191  Ddimer: 9.2(H)  INR: 0.97  Troponin: pending   Blood culture pending x2    Interventions:  0004 NS 1000 mL IV  0146 Toradol 15 mg IV  0214 NS 1000 mL IV      Emergency Department Course:  Nursing notes and vitals reviewed.    2350 I performed an exam of the patient as documented above.     IV was inserted and blood was drawn for laboratory testing, results above.    The patient provided a urine sample here in the emergency department. This was sent for laboratory testing, findings above.    The patient was sent for a CT Abdomen Pelvis while in the emergency department, results above.     0124 I returned to update the patient.     0243 I returned to update the patient.     Influenza A/B antigen obtained, results above.     The patient was sent for a CT Chest pulmonary embolism while in the emergency department, results above.     0405 I spoke with Dr. Che of the Radiology service from [facility] regarding patient's presentation, findings, and plan of care.    0408 I returned to update the patient.     0433  I spoke with Dr. Piper of the Hospitalist service from [facility] regarding patient's presentation, findings, and plan of  care.    Findings and plan explained to the Patient who consents to admission. Discussed the patient with Dr. Piper, who will admit the patient to a observation bed for further monitoring, evaluation, and treatment.    Impression & Plan    Medical Decision Making:  His presenting symptoms were initially most suspicious for ureteral colic, leading to CT imaging and initial round of laboratory studies.  The abnormalities in his lower chest detected on the abdominal CT led to further investigation with d-dimer, and ultimately a diagnosis of pulmonary embolism.  The developing left lower lung infarction would explain his presenting symptoms.  His PESI score is 113, making him class IV and therefore at fairly high risk for near-term mortality from this process.  For this reason, in addition to desire to optimize pain control and monitor his vital signs closely, I think that hospitalization is most appropriate.  I discussed potential anticoagulation options with the accepting hospitalist, who preferred to first evaluate the patient directly before selecting an anticoagulant, with expectation to start an oral anticoagulant.  He is not hypoxic and is not hypotensive, and I do not think he requires immediate consideration of thrombectomy or other more aggressive measures for his pulmonary embolism, though this can be reconsidered pending his clinical course.    Diagnosis:    ICD-10-CM    1. Other acute pulmonary embolism, unspecified whether acute cor pulmonale present (H) I26.99 Urine Culture Aerobic Bacterial     Blood culture     Blood culture     Troponin I     CBC with platelets differential       Disposition:   The patient is admitted into the care of Dr. Piper.    Scribe Disclosure:  I, Shanae Ambriz, am serving as a scribe at 11:46 PM on 12/6/2019 to document services personally performed by Stewart Nash MD based on my observations and the provider's statements to me.    EMERGENCY DEPARTMENT    This record  was created at least in part using electronic voice recognition software, so please excuse any typographical errors.       Stewart Nash MD  12/07/19 0719

## 2019-12-07 NOTE — H&P
[facility]    History and Physical - Hospitalist Service       Date of Admission:  12/6/2019    Assessment & Plan   Alec Boston Jr. is a 84 year old male admitted on 12/6/2019. He presents with left flank pain suggestive of nephrolithiasis, though was found to have hemoptysis,    Bilateral pulmonary emboli: Not entirely clear if this is provoked or unprovoked, though in my discussion with patient and his partner, at this juncture would tentatively classify as unprovoked, potentially related to suspected rheumatologic illness/inflammatory condition.  Certainly will require short-term anticoagulation regardless in the setting of pulmonary emboli, though consider at least 6 months as this would cover patient with anticoagulation for his return trip from [place] in May.  If patient tolerates anticoagulation, I certainly would consider continuing patient lifelong given pulmonary embolism of unclear etiology, though I did mention to patient and his partner that this can be further discussed with his primary care provider regarding risks and benefits of continuing versus discontinuing anticoagulation.  Last flight in October from [place] with no significant extremity swelling.  On and off of prednisone over the preceding months, which can be associated with DVT formation, though this correlation is likely related to use of prednisone to treat inflammatory conditions.   -Initiating on Xarelto 15 mg twice daily with plan to transition to 20 mg at bedtime after 3 weeks. NOAC would be preferred given patient travels annually to [place] [facility] for several months and does not have a provider in [place].  -Pharmacy liaison consult for NOAC coverage  -Dermatology follow-up as below  -Monitor for evidence of bleeding.  -Initiated on Protonix IV during observation hospitalization, transition to oral PPI therapy at discharge as patient is on prednisone, has a history of GERD, and now initiating  "anticoagulation  -Cardiac telemetry given patient's report of palpitations, though by description, relatively regular.  -TTE pending.  Patient reports some rapid palpitations without significant variability over the preceding days, though also has a mild diastolic murmur on auscultation    Early pulmonary infarct: Temperature of 100.1, tachycardia, tachypnea, and pain concerning for pulmonary infarct.  CT imaging also suggestive of left lower lobe pulmonary infarct.  With advanced age, somewhat tenuous status on presentation, cough in the days prior to presentation, opting to treat empirically with Unasyn  -Unasyn with plan to transition to oral Augmentin at discharge  -Acetaminophen, oxycodone, low-dose Dilaudid for pain control if needed  -Encourage pulmonary toilet    Rheumatologic illness, likely: Patient with sudden onset synovitis and edema of bilateral hands this past March associated with bilateral shoulder myalgias, knee pain (uncertain if effusions) which improved with several courses of steroids.  Reports being seen by multiple providers including orthopedics, several rheumatologists, PCP.  Patient tells me that he does not have a formal diagnosis, tells me it is \"not rheumatoid arthritis\" and is not familiar with the term seronegative rheumatoid arthritis.  Despite no formal serologic diagnosis, has recently been transitioned to Plaquenil and is completing a steroid taper as he transitions to this medication.  Sed rate elevated, negative rheumatoid factor, negative CCP antibody, negative SHARMILA. By history seems somewhat consistent with seronegative rheumatoid arthritis, though again, patient does not recognize this term.  -Continue prednisone 5 mg daily as patient reports he is down to 1 tab daily prednisone; following pharmacy reconciliation, if this requires dose adjustment, may need additional tablet  -Resume prior to admission Plaquenil when reconciled by pharmacy  -Discussed need for annual " funduscopic exams on Plaquenil.  Patient aware.  -Continue outpatient follow-up with rheumatology; I believe patient is followed by Dr. Oquendo of [facility] system.    Skin lesions: With innumerable seborrheic keratoses as well as questionable pearly telangiectasia just medial to left epicanthus on bridge of nose, recommended dermatology follow up at discharge. Discussed with patient and partner at bedside  -Outpatient dermatology follow-up; may require biopsy/excision of lesion pending evaluation, and if so, this may be delayed in the setting of anticoagulation.     GERD: Patient with some mild possible reflux symptoms over the preceding year or so.  -Based on age with what appears to be more recent onset of belching and reflux symptoms, recommend outpatient gastroenterology evaluation for possible endoscopy.  This can be discussed further with primary care provider  -Initiating IV PPI therapy with plan to transition to oral at discharge given prednisone and anticoagulation       Diet: Regular diet as tolerated  DVT Prophylaxis: Initiating on Xarelto  Santos Catheter: not present  Code Status: Full code    Disposition Plan   Expected discharge: Tomorrow, recommended to prior living arrangement once Echocardiogram complete, oxygenation and cardiopulmonary status stable.  Entered: Tyson Piper MD 12/07/2019, 4:40 AM     The patient's care was discussed with the Patient, patient's partner at bedside, Dr. Nash in the emergency department.    Tyson Piper MD  [facility]    ______________________________________________________________________    Chief Complaint   Left-sided flank pain, hemoptysis    History is obtained from patient, chart review, discussion with Dr. Nash in the emergency department, review of outside records including rheumatology visit with Dr. Oquendo through [facility] system in late October; it appears that patient had a similar non-concerning presentation at time of his  "follow-up then in terms of joint symptoms.    History of Present Illness   Alec Boston Jr. is a 84 year old male who presents to the emergency department with 3 days of streaks of blood with coughing episodes now acutely developing left flank pain and more notable hemoptysis found to have bilateral pulmonary emboli with a heart rate initially in the 120s range, tachypneic to 18.  CT imaging demonstrates also early left pulmonary infarct, likely explaining patient's left flank pain.  Note that initial work-up was initially concerning for renal stone given patient's location and description of discomfort, though this led to an abdomen/pelvis CT with possible basilar infiltrates.  A d-dimer was sent which was significantly elevated in the 9 range, and subsequent CT PE study resulted with pulmonary infarct and bilateral pulmonary embolism.    Patient has not been hypoxic, though has been tachypnea, tachycardic.  Following pain medications in the emergency department, his pain is well controlled.  He continues to have some hemoptysis with approximately dime size bloody sputum.    Patient is relatively healthy for an 84-year-old.  He spends 2 months a year in a cottage in [facility] with his partner, spends winter in [place], teaches piano lessons, and worked with [employer] for over 30 years.  His only medications prior to March 2019 were his eyedrops.    In January 2019, patient was running to escort someone to a parking space while in [place], tripped on a curb.  He hit his head slightly, scraped up his hands mildly, though had no significant issues with this.  In March 2019, he developed significant swelling in his hands including edema of the hands and fingers with his fingers described as \"looking like sausages\" per him and his partner.  Unable to close his hands with I believe joint tenderness in his fingers.  Around the same time, patient developed tenderness/joint discomfort in his shoulders " and knees, I am uncertain if he had effusions of shoulders or knees.  Patient followed through several providers including rheumatologists with multiple possible diagnoses including tendinitis.  Was prescribed a course of steroids through an orthopedic doctor and had immediate relief of his symptoms.  Most recently followed with a Dr. Oquendo in October and was initiated on prolonged low-dose steroid taper bridging to Plaquenil for what appears to be seronegative arthritis, though no clear diagnosis exist currently.  Patient continues to have resolution of his hand/joint symptoms and is starting 5 mg daily of his prednisone while continuing Plaquenil.    Last flight was in October returning from [place].  Patient describes no lower extremity swelling, no upper extremity swelling at that time.  No calf pain.  No family history of clotting disorders.  No personal history of blood clot.    No recent trauma.    Over the past 3 days, patient has noted some coughing with some thin streaks of blood in sputum.  No significant shortness of breath or increased work of breathing.  He has also noted some rapid palpitations while laying down at night and feeling as though his heart was beating harder.  He notes some mild irregularity with his heart, though only 1 or 2 skipped beats, largely rapid palpitations.    Tonight, while at [employer], patient developed left flank pain.  He tolerated this through approximately one half of the performance, though at the end of the concert, had difficulty walking secondary to the pain.  He does not necessarily describe his pain is pleuritic.  No fevers or chills.    Patient was brought to the emergency department, where he was initially thought to have a renal stone as above, though diagnostic evaluation subsequently resulted in findings of bilateral pulmonary embolism with left pulmonary infarct.    Lengthy discussion with patient and his partner and the emergency department regarding  treatment plan going forward.  As above, I have a tendency to consider this event and unprovoked pulmonary embolism given travel was in October with no significant extremity symptoms, suspect there may be some degree of systemic inflammation contributing to thrombus formation.  Even if PE was considered provoked, patient still travels regularly, and plans to travel back to [place] for 2 months between March and May.  In this situation, would be at risk for recurrent provoked pulmonary emboli if it is thought that his prolonged flights resulted in PEs.  Discussed that this could be addressed with short-term anticoagulation during long-distance travel, though again, if patient tolerates, could simply consider lifelong anticoagulation given unclear etiology of emboli.      Review of Systems    The 10 point Review of Systems is negative other than noted in the HPI or here.  No fevers  Pain is improved following pain medications in the emergency [place]  Patient with a somewhat suspicious appearing mostly flat pearly telangiectasia along left side of bridge of nose.  Patient does not believe this is changed in the past several years.    Past Medical History    I have reviewed this patient's medical history and updated it with pertinent information if needed.   Past Medical History:   Diagnosis Date     Cholelithiasis      Glaucoma      Testicular cancer (H)        Past Surgical History   I have reviewed this patient's surgical history and updated it with pertinent information if needed.  Past Surgical History:   Procedure Laterality Date     HC LAP,ORCHIECTOMY       INCISION LIMBAL RELAXING, KERATOTOMY ARCUATE  5/6/2013    Procedure: INCISION LIMBAL RELAXING, KERATOTOMY ARCUATE;;  Surgeon: Scott Cruz MD;  Location:  [facility]     jaw orif[       LAPAROSCOPIC CHOLECYSTECTOMY       LASER SELECTIVE TRABECULOPLASTY Left 12/19/2016    Procedure: LASER SELECTIVE TRABECULOPLASTY;  Surgeon: Scott Cruz MD;  Location:   [facility]     PHACOEMULSIFICATION CLEAR CORNEA WITH STANDARD INTRAOCULAR LENS IMPLANT  2013    Procedure: PHACOEMULSIFICATION CLEAR CORNEA WITH STANDARD INTRAOCULAR LENS IMPLANT;  LEFT PHACOEMULSIFICATION CLEAR CORNEA WITH STANDARD INTRAOCULAR LENS IMPLANT, WITH LIMBAL RELAXING INCISION;  Surgeon: Nancy, Scott CLEARY MD;  Location: [facility]       Social History   I have reviewed this patient's social history and updated it with pertinent information if needed.  Social History     Tobacco Use     Smoking status: Never Smoker   Substance Use Topics     Alcohol use: Yes     Drug use: No       Family History   I have reviewed this patient's family history and updated it with pertinent information if needed.   Father  ~90 of parkinson's dz, mother  at age 96.  No siblings    Prior to Admission Medications   Prior to Admission Medications   Prescriptions Last Dose Informant Patient Reported? Taking?   hydroxychloroquine (PLAQUENIL) 200 MG tablet   Yes Yes   Sig: Take 400 mg by mouth   predniSONE (DELTASONE) 5 MG tablet   Yes Yes   Sig: Take 10 mg by mouth   timolol (TIMOPTIC) 0.5 % ophthalmic solution   Yes No   Si drop 2 times daily.   travoprost SIENA FREE (TRAVATAN Z) 0.004 % ophthalmic solution   Yes Yes   Sig: Apply 1 drop to eye      Facility-Administered Medications: None     Allergies   No Known Allergies    Physical Exam   Vital Signs: Temp: 100.1  F (37.8  C) Temp src: Oral BP: (!) 144/75 Pulse: 98   Resp: 18 SpO2: 91 %        General Appearance: 84-year-old male who appears younger than stated age.  Patient is thin and somewhat frail appearing overall  Eyes: No scleral icterus  HEENT: Normocephalic  Respiratory: Breath sounds are clear bilaterally without wheezes or crackles.  Cardiovascular: Regular rate and rhythm with heart rate currently in the 80s.  At apex, patient has a soft diastolic murmur.  He has been told that he had a murmur in childhood, though I doubt diastolic as there is no concern with  this.  GI: Thin, nontender to palpation.  No reproducible discomfort, no palpable mass.  Lymph/Hematologic: No lower extremity edema  Genitourinary: Not examined  Skin: Innumerable seborrheic keratoses.  Approximately 2 cm x 0.5 cm flat pearly telangiectasias suggestive of basal cell carcinoma along the left nasal bridge.  Patient reports that this is unchanged over years as far as he is aware.  Rosacea with mild rhinophyma  Musculoskeletal: No joint effusions or swelling, no synovitis of hands.  Mild diffuse muscular wasting which is most notable in thenar eminence bilaterally  Neurologic: Alert, conversant, appropriate in conversation.  Mental status grossly intact  Psychiatric: Normal affect, very pleasant    Data   Data reviewed today: I reviewed all medications, new labs and imaging results over the last 24 hours. I personally reviewed the chest CT image(s) showing Bilateral pulmonary emboli.    Recent Labs   Lab 12/07/19  0723 12/07/19  0000   WBC 8.6 11.0   HGB 12.9* 15.5   MCV 94 93    192   INR  --  0.97   NA  --  135   POTASSIUM  --  4.2   CHLORIDE  --  104   CO2  --  25   BUN  --  23   CR  --  1.13   ANIONGAP  --  6   HOWIE  --  9.3   GLC  --  99   ALBUMIN  --  3.7   PROTTOTAL  --  8.0   BILITOTAL  --  0.9   ALKPHOS  --  48   ALT  --  23   AST  --  11   LIPASE  --  191   TROPI  --  <0.015

## 2019-12-07 NOTE — PROVIDER NOTIFICATION
MD Notification    Notified Person: MD novak    Notified Person Name:    Notification Date/Time: 12/7/2019 4:11 PM      Notification Interaction:    Purpose of Notification:  Pt with 4 episodes of small hemoptysis     Orders Received: monitor and determine if switching to hep gtt    Comments:

## 2019-12-07 NOTE — PROGRESS NOTES
Pt seen and examined. Seen by my colleague, Dr Mcguire in the morning. H/P, labs, vital signs and orders reviewed. Agree with his A/P.  84-year-old male presenting of left flank/lower chest pain and is found to have bilateral pulmonary emboli with probable developing left lower lobe pulmonary infarct.  Patient is hemodynamically stable at this time, initially was tachycardic but heart rates have now improved.  Denies left chest/upper abdomen pain.  Awaiting echo and lower extremity venous Doppler today  Continue Xarelto  Continue antibiotics for potential pneumonia however findings are most likely due to pulmonary infarct.  Reassess in AM    Addendum:  Patient with 3 episodes of hemoptysis today-albeit scant  Left lower extremity Doppler with extensive DVT  Discussed with Dr. Solange Mak, interventional radiology, she felt patient would be appropriate for mechanical thrombectomy  He is tentatively on the schedule for 8 AM tomorrow.  Also due to ongoing hemoptysis, discontinue Xarelto.  Will start heparin drip without bolus at 9:30 PM tonight

## 2019-12-07 NOTE — PLAN OF CARE
Pt with new LLE clot, zuleima PE. Xarelto given, now stopped d/t hemoptysis, will start hep gtt this evening. NPO after MN for thrombectomy tmr. Chills/fever to 100.8 ax this shankar, switched to zosyn, BC done. A&Ox4.

## 2019-12-07 NOTE — ED NOTES
"[facility]  ED Nurse Handoff Report    ED Chief complaint: Abdominal Pain      ED Diagnosis:   Final diagnoses:   None       Code Status: to be assessed by admitting provider     Allergies: No Known Allergies    Activity level - Baseline/Home:  Independent  Activity Level - Current:   Independent    Patient's Preferred language: english   Needed?: No    Isolation: No  Infection: Not Applicable  Bariatric?: No    Vital Signs:   Vitals:    12/07/19 0130 12/07/19 0145 12/07/19 0200 12/07/19 0230   BP: (!) 141/105  (!) 148/78 (!) 144/75   Pulse: 117  102 98   Resp:       Temp:  100.1  F (37.8  C)     TempSrc:  Oral     SpO2: 93%  91% 91%       Cardiac Rhythm: ,        Pain level: 0-10 Pain Scale: 9    Is this patient confused?: No   Does this patient have a guardian?  No         If yes, is there guardianship documents in the Epic \"Code/ACP\" activity?  N/A         Guardian Notified?  N/A  [place] - Suicide Severity Rating Scale Completed?  Yes  If yes, what color did the patient score?  White    Patient Report: Initial Complaint: Patient arrived to the ED today with complaints of left lower abdominal pain. Patient reports the pain began this morning but was not bad however when he was walking back to his car after a concert he stated that the pain was 10/10 and he could hardly walk the two block distance. Upon arrival patients symptoms had decreased significantly however patient states that he feels \"really shaky.\" Patient also has a productive cough also that is producing some blood tinged sputum.   Focused Assessment:   Respiratory: Patient has a productive cough with blood tinged sputum   Cardiac: Patient has been tachycardic and hypertensive   Neuro: WDL   GI: Patient has left sided abdominal/flank pain.  : WDL   Tests Performed: CT abdomen pelvis, CT PE, labs, UA, Influenza, blood cultures,  Abnormal Results:   Results for orders placed or performed during the hospital encounter of " 12/06/19   CBC with platelets differential     Status: Abnormal   Result Value Ref Range    WBC 11.0 4.0 - 11.0 10e9/L    RBC Count 4.89 4.4 - 5.9 10e12/L    Hemoglobin 15.5 13.3 - 17.7 g/dL    Hematocrit 45.6 40.0 - 53.0 %    MCV 93 78 - 100 fl    MCH 31.7 26.5 - 33.0 pg    MCHC 34.0 31.5 - 36.5 g/dL    RDW 13.0 10.0 - 15.0 %    Platelet Count 192 150 - 450 10e9/L    Diff Method Automated Method     % Neutrophils 79.1 %    % Lymphocytes 10.5 %    % Monocytes 10.0 %    % Eosinophils 0.0 %    % Basophils 0.1 %    % Immature Granulocytes 0.3 %    Nucleated RBCs 0 0 /100    Absolute Neutrophil 8.7 (H) 1.6 - 8.3 10e9/L    Absolute Lymphocytes 1.2 0.8 - 5.3 10e9/L    Absolute Monocytes 1.1 0.0 - 1.3 10e9/L    Absolute Eosinophils 0.0 0.0 - 0.7 10e9/L    Absolute Basophils 0.0 0.0 - 0.2 10e9/L    Abs Immature Granulocytes 0.0 0 - 0.4 10e9/L    Absolute Nucleated RBC 0.0    Comprehensive metabolic panel     Status: Abnormal   Result Value Ref Range    Sodium 135 133 - 144 mmol/L    Potassium 4.2 3.4 - 5.3 mmol/L    Chloride 104 94 - 109 mmol/L    Carbon Dioxide 25 20 - 32 mmol/L    Anion Gap 6 3 - 14 mmol/L    Glucose 99 70 - 99 mg/dL    Urea Nitrogen 23 7 - 30 mg/dL    Creatinine 1.13 0.66 - 1.25 mg/dL    GFR Estimate 59 (L) >60 mL/min/[1.73_m2]    GFR Estimate If Black 69 >60 mL/min/[1.73_m2]    Calcium 9.3 8.5 - 10.1 mg/dL    Bilirubin Total 0.9 0.2 - 1.3 mg/dL    Albumin 3.7 3.4 - 5.0 g/dL    Protein Total 8.0 6.8 - 8.8 g/dL    Alkaline Phosphatase 48 40 - 150 U/L    ALT 23 0 - 70 U/L    AST 11 0 - 45 U/L   Lipase     Status: None   Result Value Ref Range    Lipase 191 73 - 393 U/L   UA with Microscopic     Status: Abnormal   Result Value Ref Range    Color Urine Yellow     Appearance Urine Clear     Glucose Urine Negative NEG^Negative mg/dL    Bilirubin Urine Negative NEG^Negative    Ketones Urine Negative NEG^Negative mg/dL    Specific Gravity Urine 1.022 1.003 - 1.035    Blood Urine Moderate (A) NEG^Negative    pH  Urine 5.0 5.0 - 7.0 pH    Protein Albumin Urine 30 (A) NEG^Negative mg/dL    Urobilinogen mg/dL Normal 0.0 - 2.0 mg/dL    Nitrite Urine Negative NEG^Negative    Leukocyte Esterase Urine Negative NEG^Negative    Source Midstream Urine     WBC Urine 1 0 - 5 /HPF    RBC Urine 4 (H) 0 - 2 /HPF    Bacteria Urine Few (A) NEG^Negative /HPF    Mucous Urine Present (A) NEG^Negative /LPF   Creatinine POCT     Status: Abnormal   Result Value Ref Range    Creatinine 1.2 0.66 - 1.25 mg/dL    GFR Estimate 58 (L) >60 mL/min/[1.73_m2]    GFR Estimate If Black 70 >60 mL/min/[1.73_m2]   D dimer quantitative     Status: Abnormal   Result Value Ref Range    D Dimer 9.2 (H) 0.0 - 0.50 ug/ml FEU       Treatments provided: fluids    Family Comments: partner at bedside     OBS brochure/video discussed/provided to patient/family: Yes              Name of person given brochure if not patient: n/a              Relationship to patient: n/a    ED Medications:   Medications   0.9% sodium chloride BOLUS (0 mLs Intravenous Stopped 12/7/19 0214)     Followed by   sodium chloride 0.9% infusion (has no administration in time range)   0.9% sodium chloride BOLUS (1,000 mLs Intravenous New Bag 12/7/19 0214)     Followed by   sodium chloride 0.9% infusion (has no administration in time range)   iopamidol (ISOVUE-370) solution 78 mL (78 mLs Intravenous Given 12/7/19 0046)   Saline flush (64 mLs Intravenous Given 12/7/19 0046)   ketorolac (TORADOL) injection 15 mg (15 mg Intravenous Given 12/7/19 0146)       Drips infusing?:  No    For the majority of the shift this patient was Green.   Interventions performed were none .    Severe Sepsis OR Septic Shock Diagnosis Present: No    To be done/followed up on inpatient unit:  continue to monitor     ED NURSE PHONE NUMBER: [phone]

## 2019-12-07 NOTE — PROVIDER NOTIFICATION
MD Notification    Notified Person: MD novak    Notified Person Name:    Notification Date/Time: 12/7/2019 4:10 PM      Notification Interaction:    Purpose of Notification: 100.8 ax, chills prior    Orders Received:    Comments:

## 2019-12-07 NOTE — ED TRIAGE NOTES
Left sided flank and abdominal pain that started earlier today. Patient also states he feels shaky.

## 2019-12-08 ENCOUNTER — APPOINTMENT (OUTPATIENT)
Dept: INTERVENTIONAL RADIOLOGY/VASCULAR | Facility: CLINIC | Age: 84
DRG: 981 | End: 2019-12-08
Attending: INTERNAL MEDICINE
Payer: COMMERCIAL

## 2019-12-08 ENCOUNTER — APPOINTMENT (OUTPATIENT)
Dept: CARDIOLOGY | Facility: CLINIC | Age: 84
DRG: 981 | End: 2019-12-08
Attending: INTERNAL MEDICINE
Payer: COMMERCIAL

## 2019-12-08 LAB
APTT PPP: 115 SEC (ref 22–37)
APTT PPP: 208 SEC (ref 22–37)
APTT PPP: >240 SEC (ref 22–37)
BACTERIA SPEC CULT: NO GROWTH
ERYTHROCYTE [DISTWIDTH] IN BLOOD BY AUTOMATED COUNT: 13.1 % (ref 10–15)
HCT VFR BLD AUTO: 37.5 % (ref 40–53)
HGB BLD-MCNC: 12.4 G/DL (ref 13.3–17.7)
LMWH PPP CHRO-ACNC: 0.77 IU/ML
LMWH PPP CHRO-ACNC: 1.85 IU/ML
LMWH PPP CHRO-ACNC: >2 IU/ML
Lab: NORMAL
MCH RBC QN AUTO: 31.1 PG (ref 26.5–33)
MCHC RBC AUTO-ENTMCNC: 33.1 G/DL (ref 31.5–36.5)
MCV RBC AUTO: 94 FL (ref 78–100)
PLATELET # BLD AUTO: 137 10E9/L (ref 150–450)
PROCALCITONIN SERPL-MCNC: 0.17 NG/ML
RBC # BLD AUTO: 3.99 10E12/L (ref 4.4–5.9)
SPECIMEN SOURCE: NORMAL
WBC # BLD AUTO: 10.7 10E9/L (ref 4–11)

## 2019-12-08 PROCEDURE — 067D3DZ DILATION OF LEFT COMMON ILIAC VEIN WITH INTRALUMINAL DEVICE, PERCUTANEOUS APPROACH: ICD-10-PCS | Performed by: RADIOLOGY

## 2019-12-08 PROCEDURE — C9113 INJ PANTOPRAZOLE SODIUM, VIA: HCPCS | Performed by: INTERNAL MEDICINE

## 2019-12-08 PROCEDURE — 25000132 ZZH RX MED GY IP 250 OP 250 PS 637: Performed by: INTERNAL MEDICINE

## 2019-12-08 PROCEDURE — 25000131 ZZH RX MED GY IP 250 OP 636 PS 637: Performed by: INTERNAL MEDICINE

## 2019-12-08 PROCEDURE — C1769 GUIDE WIRE: HCPCS

## 2019-12-08 PROCEDURE — 25000128 H RX IP 250 OP 636: Performed by: INTERNAL MEDICINE

## 2019-12-08 PROCEDURE — 27210742 ZZH CATH CR1

## 2019-12-08 PROCEDURE — 36415 COLL VENOUS BLD VENIPUNCTURE: CPT | Performed by: INTERNAL MEDICINE

## 2019-12-08 PROCEDURE — B51C1ZZ FLUOROSCOPY OF LEFT LOWER EXTREMITY VEINS USING LOW OSMOLAR CONTRAST: ICD-10-PCS | Performed by: RADIOLOGY

## 2019-12-08 PROCEDURE — 99233 SBSQ HOSP IP/OBS HIGH 50: CPT | Performed by: INTERNAL MEDICINE

## 2019-12-08 PROCEDURE — 85730 THROMBOPLASTIN TIME PARTIAL: CPT | Performed by: INTERNAL MEDICINE

## 2019-12-08 PROCEDURE — 37238 OPEN/PERQ PLACE STENT SAME: CPT

## 2019-12-08 PROCEDURE — 93306 TTE W/DOPPLER COMPLETE: CPT | Mod: 26 | Performed by: INTERNAL MEDICINE

## 2019-12-08 PROCEDURE — 25000125 ZZHC RX 250

## 2019-12-08 PROCEDURE — 25500064 ZZH RX 255 OP 636: Performed by: RADIOLOGY

## 2019-12-08 PROCEDURE — 27210845 ZZH DEVICE INFLATION CR5

## 2019-12-08 PROCEDURE — 75820 VEIN X-RAY ARM/LEG: CPT | Mod: LT

## 2019-12-08 PROCEDURE — 36005 INJECTION EXT VENOGRAPHY: CPT

## 2019-12-08 PROCEDURE — 12000000 ZZH R&B MED SURG/OB

## 2019-12-08 PROCEDURE — 40000264 ECHOCARDIOGRAM COMPLETE

## 2019-12-08 PROCEDURE — 84145 PROCALCITONIN (PCT): CPT | Performed by: INTERNAL MEDICINE

## 2019-12-08 PROCEDURE — 85027 COMPLETE CBC AUTOMATED: CPT | Performed by: INTERNAL MEDICINE

## 2019-12-08 PROCEDURE — 25800030 ZZH RX IP 258 OP 636: Performed by: RADIOLOGY

## 2019-12-08 PROCEDURE — 27210804 ZZH SHEATH CR3

## 2019-12-08 PROCEDURE — 25000128 H RX IP 250 OP 636: Performed by: RADIOLOGY

## 2019-12-08 PROCEDURE — 85520 HEPARIN ASSAY: CPT | Performed by: INTERNAL MEDICINE

## 2019-12-08 PROCEDURE — 25500064 ZZH RX 255 OP 636: Performed by: INTERNAL MEDICINE

## 2019-12-08 PROCEDURE — 27210886 ZZH ACCESSORY CR5

## 2019-12-08 PROCEDURE — C1753 CATH, INTRAVAS ULTRASOUND: HCPCS

## 2019-12-08 PROCEDURE — 25000128 H RX IP 250 OP 636

## 2019-12-08 PROCEDURE — C1725 CATH, TRANSLUMIN NON-LASER: HCPCS

## 2019-12-08 RX ORDER — HEPARIN SODIUM 1000 [USP'U]/ML
500-6000 INJECTION, SOLUTION INTRAVENOUS; SUBCUTANEOUS
Status: COMPLETED | OUTPATIENT
Start: 2019-12-08 | End: 2019-12-08

## 2019-12-08 RX ORDER — FLUMAZENIL 0.1 MG/ML
0.2 INJECTION, SOLUTION INTRAVENOUS
Status: DISCONTINUED | OUTPATIENT
Start: 2019-12-08 | End: 2019-12-08 | Stop reason: HOSPADM

## 2019-12-08 RX ORDER — FENTANYL CITRATE 50 UG/ML
INJECTION, SOLUTION INTRAMUSCULAR; INTRAVENOUS
Status: COMPLETED
Start: 2019-12-08 | End: 2019-12-08

## 2019-12-08 RX ORDER — DEXTROSE MONOHYDRATE 25 G/50ML
25-50 INJECTION, SOLUTION INTRAVENOUS
Status: DISCONTINUED | OUTPATIENT
Start: 2019-12-08 | End: 2019-12-09 | Stop reason: HOSPADM

## 2019-12-08 RX ORDER — LIDOCAINE HYDROCHLORIDE 10 MG/ML
INJECTION, SOLUTION INFILTRATION; PERINEURAL
Status: DISCONTINUED
Start: 2019-12-08 | End: 2019-12-08 | Stop reason: HOSPADM

## 2019-12-08 RX ORDER — ACETAMINOPHEN 500 MG
500 TABLET ORAL EVERY 6 HOURS PRN
Status: DISCONTINUED | OUTPATIENT
Start: 2019-12-08 | End: 2019-12-09

## 2019-12-08 RX ORDER — IOPAMIDOL 612 MG/ML
50 INJECTION, SOLUTION INTRAVASCULAR ONCE
Status: COMPLETED | OUTPATIENT
Start: 2019-12-08 | End: 2019-12-08

## 2019-12-08 RX ORDER — NALOXONE HYDROCHLORIDE 0.4 MG/ML
.1-.4 INJECTION, SOLUTION INTRAMUSCULAR; INTRAVENOUS; SUBCUTANEOUS
Status: DISCONTINUED | OUTPATIENT
Start: 2019-12-08 | End: 2019-12-08 | Stop reason: HOSPADM

## 2019-12-08 RX ORDER — HEPARIN SODIUM 1000 [USP'U]/ML
INJECTION, SOLUTION INTRAVENOUS; SUBCUTANEOUS
Status: COMPLETED
Start: 2019-12-08 | End: 2019-12-08

## 2019-12-08 RX ORDER — PANTOPRAZOLE SODIUM 40 MG/1
40 TABLET, DELAYED RELEASE ORAL
Status: DISCONTINUED | OUTPATIENT
Start: 2019-12-09 | End: 2019-12-09 | Stop reason: HOSPADM

## 2019-12-08 RX ORDER — FENTANYL CITRATE 50 UG/ML
25-50 INJECTION, SOLUTION INTRAMUSCULAR; INTRAVENOUS EVERY 5 MIN PRN
Status: DISCONTINUED | OUTPATIENT
Start: 2019-12-08 | End: 2019-12-08 | Stop reason: HOSPADM

## 2019-12-08 RX ORDER — NICOTINE POLACRILEX 4 MG
15-30 LOZENGE BUCCAL
Status: DISCONTINUED | OUTPATIENT
Start: 2019-12-08 | End: 2019-12-09 | Stop reason: HOSPADM

## 2019-12-08 RX ADMIN — FENTANYL CITRATE 50 MCG: 50 INJECTION, SOLUTION INTRAMUSCULAR; INTRAVENOUS at 08:55

## 2019-12-08 RX ADMIN — HEPARIN SODIUM 950 UNITS/HR: 10000 INJECTION, SOLUTION INTRAVENOUS at 14:29

## 2019-12-08 RX ADMIN — MIDAZOLAM HYDROCHLORIDE 0.5 MG: 1 INJECTION, SOLUTION INTRAMUSCULAR; INTRAVENOUS at 09:53

## 2019-12-08 RX ADMIN — PIPERACILLIN AND TAZOBACTAM 3.38 G: 3; .375 INJECTION, POWDER, LYOPHILIZED, FOR SOLUTION INTRAVENOUS at 11:41

## 2019-12-08 RX ADMIN — FENTANYL CITRATE 25 MCG: 50 INJECTION, SOLUTION INTRAMUSCULAR; INTRAVENOUS at 10:24

## 2019-12-08 RX ADMIN — FENTANYL CITRATE 25 MCG: 50 INJECTION, SOLUTION INTRAMUSCULAR; INTRAVENOUS at 09:53

## 2019-12-08 RX ADMIN — HYDROXYCHLOROQUINE SULFATE 400 MG: 200 TABLET ORAL at 11:41

## 2019-12-08 RX ADMIN — MIDAZOLAM HYDROCHLORIDE 1 MG: 1 INJECTION, SOLUTION INTRAMUSCULAR; INTRAVENOUS at 08:54

## 2019-12-08 RX ADMIN — HUMAN ALBUMIN MICROSPHERES AND PERFLUTREN 9 ML: 10; .22 INJECTION, SOLUTION INTRAVENOUS at 15:15

## 2019-12-08 RX ADMIN — PANTOPRAZOLE SODIUM 40 MG: 40 INJECTION, POWDER, FOR SOLUTION INTRAVENOUS at 11:40

## 2019-12-08 RX ADMIN — PIPERACILLIN AND TAZOBACTAM 3.38 G: 3; .375 INJECTION, POWDER, LYOPHILIZED, FOR SOLUTION INTRAVENOUS at 23:31

## 2019-12-08 RX ADMIN — HEPARIN SODIUM 6000 UNITS: 1000 INJECTION, SOLUTION INTRAVENOUS; SUBCUTANEOUS at 08:52

## 2019-12-08 RX ADMIN — MIDAZOLAM HYDROCHLORIDE 0.5 MG: 1 INJECTION, SOLUTION INTRAMUSCULAR; INTRAVENOUS at 09:21

## 2019-12-08 RX ADMIN — MIDAZOLAM HYDROCHLORIDE 0.5 MG: 1 INJECTION, SOLUTION INTRAMUSCULAR; INTRAVENOUS at 10:24

## 2019-12-08 RX ADMIN — HEPARIN SODIUM 10000 UNITS: 10000 INJECTION INTRAVENOUS; SUBCUTANEOUS at 09:06

## 2019-12-08 RX ADMIN — FENTANYL CITRATE 25 MCG: 50 INJECTION, SOLUTION INTRAMUSCULAR; INTRAVENOUS at 09:21

## 2019-12-08 RX ADMIN — PREDNISONE 5 MG: 5 TABLET ORAL at 11:41

## 2019-12-08 RX ADMIN — HEPARIN SODIUM 10000 UNITS: 10000 INJECTION INTRAVENOUS; SUBCUTANEOUS at 09:25

## 2019-12-08 RX ADMIN — LIDOCAINE HYDROCHLORIDE 17 ML: 10 INJECTION, SOLUTION INFILTRATION; PERINEURAL at 10:38

## 2019-12-08 RX ADMIN — TRAVOPROST 1 DROP: 0.04 SOLUTION/ DROPS OPHTHALMIC at 20:08

## 2019-12-08 RX ADMIN — PIPERACILLIN AND TAZOBACTAM 3.38 G: 3; .375 INJECTION, POWDER, LYOPHILIZED, FOR SOLUTION INTRAVENOUS at 05:35

## 2019-12-08 RX ADMIN — ACETAMINOPHEN 650 MG: 325 TABLET, FILM COATED ORAL at 20:11

## 2019-12-08 RX ADMIN — PIPERACILLIN AND TAZOBACTAM 3.38 G: 3; .375 INJECTION, POWDER, LYOPHILIZED, FOR SOLUTION INTRAVENOUS at 18:12

## 2019-12-08 RX ADMIN — HEPARIN SODIUM 6000 UNITS: 1000 INJECTION INTRAVENOUS; SUBCUTANEOUS at 08:52

## 2019-12-08 RX ADMIN — IOPAMIDOL 35 ML: 612 INJECTION, SOLUTION INTRAVENOUS at 10:38

## 2019-12-08 ASSESSMENT — ACTIVITIES OF DAILY LIVING (ADL)
ADLS_ACUITY_SCORE: 14
ADLS_ACUITY_SCORE: 16
ADLS_ACUITY_SCORE: 14

## 2019-12-08 NOTE — PROCEDURES
[facility]    Procedure: Left lower extremity venogram  Date/Time: 12/8/2019 11:07 AM  Performed by: Solange Mak DO  Authorized by: Solange Mak DO     UNIVERSAL PROTOCOL   Site Marked: Yes  Prior Images Obtained and Reviewed:  Yes  Required items: Required blood products, implants, devices and special equipment available    Patient identity confirmed:  Verbally with patient  Patient was reevaluated immediately before administering moderate or deep sedation or anesthesia  Confirmation Checklist:  Patient's identity using two indicators, relevant allergies, procedure was appropriate and matched the consent or emergent situation and correct equipment/implants were available  Time out: Immediately prior to the procedure a time out was called    Preparation: Patient was prepped and draped in usual sterile fashion       ANESTHESIA    Anesthesia: Local infiltration      SEDATION    Patient Sedated: Yes    Sedation Type:  Moderate (conscious) sedation  Vital signs: Vital signs monitored during sedation    See dictated procedure note for full details.  Findings: Left lower extremity venogram showed initial access into a small irregular collateral vein, there was filling of the femoral vein from the collateral vein. 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 Multiple attempts were made to gain access into the possible thrombosed duplicated femoral vein without success.     Access was gained into the main left femoral vein, and venogram showed no DVT in the femoral vein, common femoral vein, external iliac vein and common iliac vein, thrombus likely resolved with heparin drip overnight.     Intravascular ultrasound showed severe stenosis of the left common iliac vein. A 16 x 60 mm self expanding stent was placed into the left common iliac vein. Post images shows no residual stenosis.     Specimens: none    Complications: None    Condition:  Stable    Plan: Bed rest for 2 hours.     Clinic follow up in approximately 1 month with repeat ultrasound (Ultrasound order is already entered).    PROCEDURE   Patient Tolerance:  Patient tolerated the procedure well with no immediate complications    Length of time physician/provider present for 1:1 monitoring during sedation: 105

## 2019-12-08 NOTE — PLAN OF CARE
Shift Note: A&O, up with SBA, hep gtt stopped at 0530 for hep 10A >2 and , restarted at 11ml/hr at 0630 with Hep 10A and PTT to be drawn at 1230, NPO since midnight for thrombectomy this morning, per MD okay to leave hep gtt running, LS diminished, O2 upper 80s on RA, placed on 3L NC, some hemoptysis last evening, nothing noted overnight, temp 101.4, came down with tylenol, on anbx, BC pending

## 2019-12-08 NOTE — PROGRESS NOTES
[facility]    Medicine Progress Note - Hospitalist Service        Date of Admission:  12/6/2019 11:32 PM    Assessment & Plan:   Alec Boston Jr. is a 84 year old male admitted on 12/6/2019. 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  -He was also found to have severe stenosis of the left common iliac vein which was stented with a self-expanding stent, discussed with Dr. Solange Mak from radiology, this does not require Plavix  -Continue heparin drip for today  -Vascular medicine to see tomorrow  -Patient had few episodes of hemoptysis yesterday which seems to have resolved, likely due to the pulmonary infarct  -Pharmacy liaison consult for NOAC coverage     Suspected pneumonia:  -Patient also endorsing productive cough with hemoptysis  -Had fever up to 101.4 last night with chills, although the fever could be due to pulmonary infarct  -Empirically being treated for pneumonia, continue Zosyn  -Currently on oxygen at 3 L/min, wean as able  -Procalcitonin is marginally elevated  -Await cultures     B/L hand ?arthritis  -Patient being worked up outpatient for sudden onset synovitis and edema of bilateral hands this past March associated with bilateral shoulder myalgias,  knee pain (uncertain if effusions) which improved with several courses of steroids.  Reports being seen by multiple providers including orthopedics, several rheumatologists, PCP.  Per patient he does not have a formal diagnosis  -Despite no formal serologic diagnosis, has recently been transitioned to Plaquenil and is completing a steroid taper as he transitions to this medication.   -Continue prednisone 5 mg daily as patient reports he is down to 1 tab daily prednisone  -Continue prior to admission Plaquenil  -Continue outpatient follow-up with rheumatology     Skin lesions   -With innumerable seborrheic keratoses as well as questionable pearly telangiectasia just medial to left epicanthus on bridge of nose, recommended dermatology follow up at discharge.     GERD   -Patient with some mild possible reflux symptoms over the preceding year or so.  -Based on age with what appears to be more recent onset of belching and reflux symptoms, recommend outpatient gastroenterology evaluation for possible endoscopy.  This can be discussed further with primary care provider  -continue PPI       Diet: Regular Diet Adult     DVT Prophylaxis: Heparin gtt  Santos Catheter: not present  Code Status: Full Code     Disposition Plan    Expected discharge: 2 - 3 days, recommended to TBD  Entered: Pino Betancur MD 12/08/2019, 11:56 AM        The patient's care was discussed with the Bedside Nurse and Patient.    Pino Betancur MD  Hospitalist Service  [facility]    ______________________________________________________________________    Interval History   Fever up to 101 overnight.  Requiring oxygen at 3 L since overnight.  Hemoptysis since yesterday afternoon appears to have resolved.  Patient went for mechanical thrombectomy of left lower extremity DVT, was found to have severe stenosis of left iliac vein which was stented but no clots in the left lower extremity venous system    Data reviewed today: I reviewed  "all medications, new labs and imaging results over the last 24 hours. I personally reviewed no images or EKG's today.    Physical Exam   Vital signs:  Temp: 96.9  F (36.1  C) Temp src: Axillary BP: 136/67 Pulse: 87 Heart Rate: 83 Resp: 16 SpO2: 90 % O2 Device: Nasal cannula Oxygen Delivery: 3 LPM Height: 172.7 cm (5' 7.99\") Weight: 70.8 kg (156 lb)  Estimated body mass index is 23.73 kg/m  as calculated from the following:    Height as of this encounter: 1.727 m (5' 7.99\").    Weight as of this encounter: 70.8 kg (156 lb).      Wt Readings from Last 2 Encounters:   12/07/19 70.8 kg (156 lb)   07/20/19 69.9 kg (154 lb)       Gen: AAOX3, NAD  HEENT: Supple neck, moist oral mucosa, no pallor  Resp: Coarse upper air noise; few crackles, no wheezes  CVS: RRR, no murmur  Abd/GI: Soft, non-tender. BS- normoactive.    Skin: Warm, dry   MSK:  no pedal edema  Neuro- CN- intact. No focal deficits.        Data   Recent Labs   Lab 12/08/19  0409 12/07/19  1515 12/07/19  0723 12/07/19  0000   WBC 10.7  --  8.6 11.0   HGB 12.4* 13.3 12.9* 15.5   MCV 94  --  94 93   *  --  151 192   INR  --   --   --  0.97   NA  --   --   --  135   POTASSIUM  --   --   --  4.2   CHLORIDE  --   --   --  104   CO2  --   --   --  25   BUN  --   --   --  23   CR  --   --   --  1.13   ANIONGAP  --   --   --  6   HOWIE  --   --   --  9.3   GLC  --   --   --  99   ALBUMIN  --   --   --  3.7   PROTTOTAL  --   --   --  8.0   BILITOTAL  --   --   --  0.9   ALKPHOS  --   --   --  48   ALT  --   --   --  23   AST  --   --   --  11   LIPASE  --   --   --  191   TROPI  --   --   --  <0.015       Recent Results (from the past 24 hour(s))   US Lower Extremity Venous Duplex Bilateral    Narrative    ULTRASOUND VENOUS LOWER EXTREMITY BILATERAL 12/7/2019 2:24 PM     HISTORY: Rule out deep vein thrombosis, pulmonary embolus, evaluate  clot burden.    COMPARISON: None.    TECHNIQUE: Ultrasound gray scale, Color Doppler flow, and spectral  Doppler waveform analysis " performed.    FINDINGS: Extensive deep vein thrombus throughout the entire left leg  extending through the external iliac and common iliac veins. The right  common femoral, femoral, popliteal, posterior tibial, and peroneal  veins are negative for thrombus.      Impression    IMPRESSION: Extensive left-sided deep vein thrombus throughout the  left lower extremity and left common and external iliac veins.    PHI SCOTT MD     Medications     heparin 100 unit/mL in 0.45% NaCl 1,100 Units/hr (12/08/19 0640)       hydroxychloroquine  400 mg Oral Daily     pantoprazole (PROTONIX) IV  40 mg Intravenous Daily with breakfast     piperacillin-tazobactam  3.375 g Intravenous Q6H     predniSONE  5 mg Oral Daily     sodium chloride (PF)  3 mL Intracatheter Q8H     travoprost SIENA FREE  1 drop Both Eyes QPM

## 2019-12-08 NOTE — PRE-PROCEDURE
GENERAL PRE-PROCEDURE:   Procedure:  PE and extensive LLE DVT.   Date/Time:  12/8/2019 8:38 AM    Verbal consent obtained?: Yes    Written consent obtained?: Yes    Risks and benefits: Risks, benefits and alternatives were discussed    Consent given by:  Patient  Patient states understanding of procedure being performed: Yes    Patient's understanding of procedure matches consent: Yes    Procedure consent matches procedure scheduled: Yes    Expected level of sedation:  Moderate  Appropriately NPO:  Yes  ASA Class:  Class 2- mild systemic disease, no acute problems, no functional limitations  Mallampati  :  Grade 2- soft palate, base of uvula, tonsillar pillars, and portion of posterior pharyngeal wall visible  Lungs:  Lungs clear with good breath sounds bilaterally  Heart:  Normal heart sounds and rate  History & Physical reviewed:  History and physical reviewed and no updates needed  Statement of review:  I have reviewed the lab findings, diagnostic data, medications, and the plan for sedation

## 2019-12-08 NOTE — IR NOTE
Interventional Radiology Intra-procedural Nursing Note    Patient Name: Alec Boston Jr.  Medical Record Number: [id number]  Today's Date: December 8, 2019    Start Time: 0854  End of procedure time: 1035  Procedure: Left Lower Extremity Venogram with Venoplasty and Stent Placement  Report given to: EDGAR Parkinson station 88    Other Notes: Pt. transferred to IR table. Prepped and draped appropriately. Monitoring equipment applied. NC applied. No complaints of pain at this time. Timeout recorded. Provider reviewed labs.    11f sheath to Left Popliteal. Removed at 1040. Manual pressure held to site. Hemostasis at 1055. Site dressed with gauze and covered with tegaderm. C/D/I, soft.    Medication administration:    Heparin gtt at 1100 units/hr (continued from station 88) per Dr.. Mak  Heparin 6000 units IVP  Fentanyl 125 mcg IVP  Versed 2.5 mg IVP

## 2019-12-09 VITALS
OXYGEN SATURATION: 94 % | TEMPERATURE: 97.3 F | BODY MASS INDEX: 23.64 KG/M2 | RESPIRATION RATE: 16 BRPM | DIASTOLIC BLOOD PRESSURE: 69 MMHG | HEIGHT: 68 IN | SYSTOLIC BLOOD PRESSURE: 137 MMHG | WEIGHT: 156 LBS | HEART RATE: 86 BPM

## 2019-12-09 DIAGNOSIS — R09.89 OTHER SPECIFIED SYMPTOMS AND SIGNS INVOLVING THE CIRCULATORY AND RESPIRATORY SYSTEMS: Primary | ICD-10-CM

## 2019-12-09 LAB
APTT PPP: 88 SEC (ref 22–37)
LMWH PPP CHRO-ACNC: 0.47 IU/ML

## 2019-12-09 PROCEDURE — 25000128 H RX IP 250 OP 636: Performed by: INTERNAL MEDICINE

## 2019-12-09 PROCEDURE — 99239 HOSP IP/OBS DSCHRG MGMT >30: CPT | Performed by: HOSPITALIST

## 2019-12-09 PROCEDURE — 99223 1ST HOSP IP/OBS HIGH 75: CPT | Performed by: INTERNAL MEDICINE

## 2019-12-09 PROCEDURE — 25000131 ZZH RX MED GY IP 250 OP 636 PS 637: Performed by: INTERNAL MEDICINE

## 2019-12-09 PROCEDURE — 25000132 ZZH RX MED GY IP 250 OP 250 PS 637: Performed by: INTERNAL MEDICINE

## 2019-12-09 PROCEDURE — 85520 HEPARIN ASSAY: CPT | Performed by: HOSPITALIST

## 2019-12-09 PROCEDURE — 85730 THROMBOPLASTIN TIME PARTIAL: CPT | Performed by: HOSPITALIST

## 2019-12-09 PROCEDURE — 36415 COLL VENOUS BLD VENIPUNCTURE: CPT | Performed by: HOSPITALIST

## 2019-12-09 RX ORDER — PANTOPRAZOLE SODIUM 40 MG/1
40 TABLET, DELAYED RELEASE ORAL
Qty: 90 TABLET | Refills: 1 | Status: SHIPPED | OUTPATIENT
Start: 2019-12-10 | End: 2021-12-29

## 2019-12-09 RX ADMIN — HYDROXYCHLOROQUINE SULFATE 400 MG: 200 TABLET ORAL at 08:20

## 2019-12-09 RX ADMIN — PIPERACILLIN AND TAZOBACTAM 3.38 G: 3; .375 INJECTION, POWDER, LYOPHILIZED, FOR SOLUTION INTRAVENOUS at 12:12

## 2019-12-09 RX ADMIN — PREDNISONE 5 MG: 5 TABLET ORAL at 08:20

## 2019-12-09 RX ADMIN — PANTOPRAZOLE SODIUM 40 MG: 40 TABLET, DELAYED RELEASE ORAL at 08:20

## 2019-12-09 RX ADMIN — PIPERACILLIN AND TAZOBACTAM 3.38 G: 3; .375 INJECTION, POWDER, LYOPHILIZED, FOR SOLUTION INTRAVENOUS at 06:14

## 2019-12-09 RX ADMIN — RIVAROXABAN 15 MG: 15 TABLET, FILM COATED ORAL at 16:43

## 2019-12-09 ASSESSMENT — ACTIVITIES OF DAILY LIVING (ADL)
COGNITION: 0 - NO COGNITION ISSUES REPORTED
ADLS_ACUITY_SCORE: 10
RETIRED_COMMUNICATION: 0-->UNDERSTANDS/COMMUNICATES WITHOUT DIFFICULTY
FALL_HISTORY_WITHIN_LAST_SIX_MONTHS: NO
BATHING: 0-->INDEPENDENT
DRESS: 0-->INDEPENDENT
AMBULATION: 0-->INDEPENDENT
SWALLOWING: 0-->SWALLOWS FOODS/LIQUIDS WITHOUT DIFFICULTY
TRANSFERRING: 0-->INDEPENDENT
ADLS_ACUITY_SCORE: 14
RETIRED_EATING: 0-->INDEPENDENT
ADLS_ACUITY_SCORE: 14
ADLS_ACUITY_SCORE: 10
ADLS_ACUITY_SCORE: 10
TOILETING: 0-->INDEPENDENT

## 2019-12-09 NOTE — PROGRESS NOTES
Alec Boston is an 84 year old male with a history of rheumatoid arthritis and a recent history of left flank pain and hemoptysis and was admitted on 12/7. Imaging showed several small bilateral emboli and extensive left side DVT in to the left common and external iliac veins. 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 The main left femoral vein venogram showed no thrombus within the left venous system and it was thought the heparin drip resolved the thrombus overnight. He had a severe stenosis of the iliac vein and a stent was placed without further issues.     He is being seen in IR follow up today.     S: Doing well. Tired of being in the hospital. Some pain at the back of his left knee where access for the procedure was done. No pain in his left flank. He never had swelling of his left leg and there is none today.     O: Temp:  [97  F (36.1  C)-98.7  F (37.1  C)] 97.3  F (36.3  C)  Pulse:  [83-86] 86  Heart Rate:  [83-86] 86  Resp:  [16] 16  BP: (128-138)/(58-72) 137/69  SpO2:  [94 %-95 %] 94 %    Labs, notes, imaging and VSs reviewed    ROUTINE ICU LABS (Last four results)  CMP  Recent Labs   Lab 12/07/19  0006 12/07/19  0000   NA  --  135   POTASSIUM  --  4.2   CHLORIDE  --  104   CO2  --  25   ANIONGAP  --  6   GLC  --  99   BUN  --  23   CR  --  1.13   GFRESTIMATED 58* 59*   GFRESTBLACK 70 69   HOWIE  --  9.3   PROTTOTAL  --  8.0   ALBUMIN  --  3.7   BILITOTAL  --  0.9   ALKPHOS  --  48   AST  --  11   ALT  --  23     CBC  Recent Labs   Lab 12/08/19  0409 12/07/19  1515 12/07/19  0723 12/07/19  0000   WBC 10.7  --  8.6 11.0   RBC 3.99*  --  4.18* 4.89   HGB 12.4* 13.3 12.9* 15.5   HCT 37.5*  --  39.1* 45.6   MCV 94  --  94 93   MCH 31.1  --  30.9 31.7   MCHC 33.1  --  33.0 34.0   RDW 13.1  --  13.0 13.0   *  --  151 192     INR  Recent Labs   Lab 12/07/19  0000   INR 0.97     Arterial Blood GasNo lab results found in last 7 days.   General-Alert, oriented, pleasant  talkative male in NAD. Lying in bed.   Left popliteal-Both dressings removed. Incisions CD&I. Ecchymotic around the sites. Clean gauze and tape dressings replaced. Partner watched as he will be doing this at discharge.     A/P: Alec Boston is an 84 year old male with a history of rheumatoid arthritis and a recent history of left flank pain and hemoptysis and was admitted on 12/7. Imaging showed several small bilateral emboli and extensive left side DVT in to the left common and external iliac veins. 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 The main left femoral vein venogram showed no thrombus within the left venous system and it was thought the heparin drip resolved the thrombus overnight. He had a severe stenosis of the iliac vein and a stent was placed without further issues.    Reviewed the procedure and answered general questions. Alec is still trying figure out how this happened and how would he know he had blood clots if his leg didn't swell up.     He will most likely go home tomorrow. He will follow up with Dr Mak for an US and clinic visit before he goes to [place] on 1/1/20. Ronda BRYANT RN clinician will help him to coordinate this visit.     Thanks [facility] Interventional Radiology CNP ([phone]) (phone [phone])

## 2019-12-09 NOTE — PLAN OF CARE
Patient discharged at 5:35 PM to Discharged to home  IV was discontinued. Pain at time of discharge was 0/10. Belongings returned to patient.  Discharge instructions and medications reviewed with patient and partner.  Patient verbalized understanding and all questions were answered.  Prescriptions given to patient.  At time of discharge, patient condition was stable and left the unit  escorted by RN.

## 2019-12-09 NOTE — PROVIDER NOTIFICATION
MD Notification    Person notified: primary hosptialist    Person Name: Dr. Gauthier    Date/Time: 12/9/19 at 1630    Interaction: web-based page    Purpose of Notification: FYSTELLA sneior did not finish discharge orders, could you please review: follow-up, diet, activity, and reason for hospitalization. thank you!    Orders Received:    Comments:

## 2019-12-09 NOTE — PROGRESS NOTES
[facility]    Medicine Progress Note - Hospitalist Service        Date of Admission:  12/6/2019 11:32 PM    Assessment & Plan:   Alec Boston Jr. is a 84 year old male admitted on 12/6/2019. 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  He was also found to have severe stenosis of the left common iliac vein which was stented with a self-expanding stent, discussed with Dr. Solange Mak from radiology, this does not require Plavix  plan  -Continue heparin drip for today  -Vascular medicine has been consulted, thus will ask them to order the DOAC they would wish. Patient himself wishes for xarelto given eventual once daily administration  -hemoptysis is improving, and thus think transition to DOAC is reasonable  -Pharmacy liaison indicates equivalent eliquis and xarelto dosing     Suspected pneumonia:  -Patient also endorsing productive cough with hemoptysis  -Had fever up to 101.4 last night with chills, although the fever could be due to pulmonary infarct  -transition from zosyn to augmentin  -weaned off of oxygen  -Procalcitonin is marginally elevated  -Await cultures     B/L hand inflammatory arthritis  -Patient being worked up outpatient for sudden onset synovitis and edema  of bilateral hands this past March associated with bilateral shoulder myalgias, knee pain (uncertain if effusions) which improved with several courses of steroids.  Reports being seen by multiple providers including orthopedics, several rheumatologists, PCP.  Per patient he does not have a formal diagnosis  -Despite no formal serologic diagnosis, has recently been transitioned to Plaquenil and is completing a steroid taper as he transitions to this medication.   -Continue prednisone 5 mg daily as patient reports he is down to 1 tab daily prednisone  -Continue prior to admission Plaquenil  -Continue outpatient follow-up with rheumatology     Skin lesions   -With innumerable seborrheic keratoses as well as questionable pearly telangiectasia just medial to left epicanthus on bridge of nose, recommended dermatology follow up at discharge.     GERD   -Patient with some mild possible reflux symptoms over the preceding year or so.  -Based on age with what appears to be more recent onset of belching and reflux symptoms, recommend outpatient gastroenterology evaluation for possible endoscopy.  This can be discussed further with primary care provider  -continue PPI    Thyroid nodule  - follow-up with PCP, consider biopsy. Discussed in depth with patient       Diet: Regular Diet Adult     DVT Prophylaxis: Heparin gtt  Santos Catheter: not present  Code Status: Full Code     Disposition Plan    Expected discharge: Tomorrow, recommended to home once transitions to DOAC after vascular medicine consultation  Entered: Heladio Rodriguez DO 12/09/2019, 1:47 PM        The patient's care was discussed with the Bedside Nurse and Patient.    Heladio Rodriguez DO  Hospitalist [facility]  Pager: [phone]      ______________________________________________________________________    Interval History   Weaned off of oxygen, walking around. Prefers xarelto. Hemoptysis improving greatly    Data reviewed today: I reviewed all medications, new  "labs and imaging results over the last 24 hours. I personally reviewed no images or EKG's today.    Physical Exam   Vital signs:  Temp: 97  F (36.1  C) Temp src: Oral BP: 135/70 Pulse: 84 Heart Rate: 86 Resp: 16 SpO2: 94 % O2 Device: None (Room air) Oxygen Delivery: 2 LPM Height: 172.7 cm (5' 7.99\") Weight: 70.8 kg (156 lb)  Estimated body mass index is 23.73 kg/m  as calculated from the following:    Height as of this encounter: 1.727 m (5' 7.99\").    Weight as of this encounter: 70.8 kg (156 lb).      Wt Readings from Last 2 Encounters:   19 70.8 kg (156 lb)   19 69.9 kg (154 lb)       Gen: AAOX3, NAD  HEENT: Supple neck, moist oral mucosa, no pallor  Resp: Coarse upper air noise; few crackles, no wheezes  CVS: RRR, no murmur  Abd/GI: Soft, non-tender. BS- normoactive.    Skin: Warm, dry   MSK:  no pedal edema  Neuro- CN- intact. No focal deficits.        Data   Recent Labs   Lab 19  0409 19  1515 19  0723 19  0000   WBC 10.7  --  8.6 11.0   HGB 12.4* 13.3 12.9* 15.5   MCV 94  --  94 93   *  --  151 192   INR  --   --   --  0.97   NA  --   --   --  135   POTASSIUM  --   --   --  4.2   CHLORIDE  --   --   --  104   CO2  --   --   --  25   BUN  --   --   --  23   CR  --   --   --  1.13   ANIONGAP  --   --   --  6   HOWIE  --   --   --  9.3   GLC  --   --   --  99   ALBUMIN  --   --   --  3.7   PROTTOTAL  --   --   --  8.0   BILITOTAL  --   --   --  0.9   ALKPHOS  --   --   --  48   ALT  --   --   --  23   AST  --   --   --  11   LIPASE  --   --   --  191   TROPI  --   --   --  <0.015       Recent Results (from the past 24 hour(s))   Echocardiogram Complete    Narrative    [id number]  [id number]  [id number]^SIMS^MANDA^SIVAN           [facility]  Echocardiography Laboratory  [address] [place] [id number]        Name: JUSTIN BELLA  MRN: [id number]  : 1935  Study Date: 2019 02:39 PM  Age: 84 yrs  Gender: Male  Patient Location: " [facility]88  Reason For Study: Pulmonary Emboli, Palpitations  Ordering Physician: MANDA SIMS  Referring Physician: LLUVIA FERNANDEZ  Performed By: Manda Finley RDCS     BSA: 1.8 m2  Height: 68 in  Weight: 150 lb  HR: 92  BP: 162/80 mmHg  _____________________________________________________________________________  __        Procedure  Complete Portable Echo Adult. Optison (NDC #[id number]) given intravenously.  _____________________________________________________________________________  __        Interpretation Summary     Left ventricular systolic function is normal.The visual ejection fraction is  estimated at 55-60%.  The right ventricular systolic function is normal.  Mild aortic root dilatation.  Pulmonary hypertension- RVSP 35 mm hg +RA.  _____________________________________________________________________________  __        Left Ventricle  The left ventricle is normal in size. There is mild concentric left  ventricular hypertrophy. Left ventricular systolic function is normal. The  visual ejection fraction is estimated at 55-60%. Left ventricular diastolic  function is indeterminate. No regional wall motion abnormalities noted.     Right Ventricle  The right ventricle is normal size. The right ventricular systolic function is  normal.     Atria  The left atrium is mildly dilated. Right atrial size is normal. There is no  color Doppler evidence of an atrial shunt.     Mitral Valve  There is mild mitral annular calcification. There is trace mitral  regurgitation.        Tricuspid Valve  There is trace tricuspid regurgitation. The right ventricular systolic  pressure is approximated at 34.7 mmHg plus the right atrial pressure.  Pulmonary hypertension.     Aortic Valve  The aortic valve is not well visualized. No aortic regurgitation is present.  No aortic stenosis is present.     Pulmonic Valve  The pulmonic valve is not well visualized. There is trace pulmonic valvular  regurgitation. There is no pulmonic valvular  stenosis.     Vessels  Mild aortic root dilatation. 3.8 cm. Dilation of the inferior vena cava is  present with normal respiratory variation in diameter.     Pericardium  There is no pericardial effusion.     _____________________________________________________________________________  __  MMode/2D Measurements & Calculations  IVSd: 1.1 cm  LVIDd: 4.2 cm  LVIDs: 3.0 cm  LVPWd: 1.3 cm  FS: 27.9 %  LV mass(C)d: 180.9 grams  LV mass(C)dI: 100.0 grams/m2     Ao root diam: 3.8 cm  LA dimension: 3.9 cm  LA/Ao: 1.0  LVOT diam: 2.0 cm  LVOT area: 3.2 cm2  LA Volume (BP): 66.8 ml  LA Volume Index (BP): 36.9 ml/m2  RWT: 0.62        Doppler Measurements & Calculations  MV E max prabhu: 71.0 cm/sec  MV A max prabhu: 64.4 cm/sec  MV E/A: 1.1  MV dec time: 0.26 sec  Ao V2 max: 148.9 cm/sec  Ao max P.0 mmHg  Ao V2 mean: 112.5 cm/sec  Ao mean P.5 mmHg  Ao V2 VTI: 29.1 cm  BRIDGET(I,D): 2.1 cm2  BRIDGET(V,D): 2.6 cm2     LV V1 max P.0 mmHg  LV V1 max: 122.7 cm/sec  LV V1 VTI: 19.1 cm  SV(LVOT): 61.2 ml  SI(LVOT): 33.8 ml/m2  PA acc time: 0.14 sec  TR max prabhu: 294.4 cm/sec  TR max P.7 mmHg  AV Prabhu Ratio (DI): 0.82  BRIDGET Index (cm2/m2): 1.2           _____________________________________________________________________________  __           Report approved by: Sneha Juan 2019 04:42 PM        Medications     heparin 100 unit/mL in 0.45% NaCl 850 Units/hr (19 2133)       hydroxychloroquine  400 mg Oral Daily     pantoprazole  40 mg Oral QAM AC     piperacillin-tazobactam  3.375 g Intravenous Q6H     predniSONE  5 mg Oral Daily     sodium chloride (PF)  3 mL Intracatheter Q8H     travoprost SIENA FREE  1 drop Both Eyes QPM

## 2019-12-09 NOTE — DISCHARGE SUMMARY
[facility]  Hospitalist Discharge Summary       Date of Admission:  12/6/2019  Date of Discharge:  12/9/2019  Discharging Provider: Heladio Rodriguez,       Discharge Diagnoses   Pulmonary embolism  Community acquired pneumonia  Polyarticular inflammatory arthritis    Follow-ups Needed After Discharge   Follow-up Appointments     Follow-up and recommended labs and tests       Follow up with primary care provider, Stewart Kim, within 7 days for   hospital follow- up.  No follow up labs or test are needed.    Follow up with Dr. 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  He was also found to have severe stenosis of the left common iliac vein which was stented with a self-expanding stent, discussed with Dr. Solange Mak from radiology, this does not require Plavix  plan  -xarelto  -hemoptysis is  improving, and thus think transition to DOAC is reasonable  -Pharmacy liaison indicates equivalent eliquis and xarelto dosing     Suspected pneumonia:  -finish course of augmentin  - fever could have been secondary to lung infarction     B/L hand inflammatory arthritis  -Patient being worked up outpatient for sudden onset synovitis and edema of bilateral hands this past March associated with bilateral shoulder myalgias, knee pain (uncertain if effusions) which improved with several courses of steroids.  Reports being seen by multiple providers including orthopedics, several rheumatologists, PCP.  Per patient he does not have a formal diagnosis  -Despite no formal serologic diagnosis, has recently been transitioned to Plaquenil and is completing a steroid taper as he transitions to this medication.   -Continue prednisone 5 mg daily as patient reports he is down to 1 tab daily prednisone  -Continue prior to admission Plaquenil  -Continue outpatient follow-up with rheumatology     Skin lesions   -With innumerable seborrheic keratoses as well as questionable pearly telangiectasia just medial to left epicanthus on bridge of nose, recommended dermatology follow up at discharge.     GERD   -Patient with some mild possible reflux symptoms over the preceding year or so.  -Based on age with what appears to be more recent onset of belching and reflux symptoms, recommend outpatient gastroenterology evaluation for possible endoscopy.  This can be discussed further with primary care provider  -continue PPI     Thyroid nodule  - follow-up with PCP, consider biopsy. Discussed in depth with patient    PAD  - status post femoral stenting  - follow-up with IR clinic in 1 month for repeat US    Consultations This Hospital Stay   PHARMACY LIAISON FOR MEDICATION COVERAGE CONSULT  PHARMACY LIAISON FOR MEDICATION COVERAGE CONSULT  PHARMACY TO DOSE HEPARIN  [facility] IP CONSULT    Code Status   Full Code    Time Spent on  this Encounter   I, Heladio Rodriguez DO, personally saw the patient today and spent greater than 30 minutes discharging this patient.       Heladio Rodriguez DO  [facility]  ______________________________________________________________________    Physical Exam   Vital Signs: Temp: 97.3  F (36.3  C) Temp src: Oral BP: 137/69 Pulse: 86 Heart Rate: 86 Resp: 16 SpO2: 94 % O2 Device: None (Room air) Oxygen Delivery: 2 LPM  Weight: 156 lbs 0 oz  Gen: AAOX3, NAD  HEENT: Supple neck, moist oral mucosa, no pallor  Resp: Coarse upper air noise; few crackles, no wheezes  CVS: RRR, no murmur  Abd/GI: Soft, non-tender. BS- normoactive.    Skin: Warm, dry   MSK:  no pedal edema  Neuro- CN- intact. No focal deficits.         Primary Care Physician   Stewart Kim    Discharge Orders      US Lower Extremity Venous Duplex Left     Discharge Instructions    Pt to see Dr. Santos at [facility] on 12/31/19 at 12:40 PM. Call [phone] if you need to reschedule. Pt to call the above number to also schedule LLE venous duplex.     Reason for your hospital stay    Pulmonary embolism     Follow-up and recommended labs and tests     Follow up with primary care provider, Stewart Kim, within 7 days for hospital follow- up.  No follow up labs or test are needed.    Follow up with Dr. Santos in vascular medicine As scheduled     Activity    Your activity upon discharge: ambulate in house. No lifting or heavy strenuous exercise until seen by primary care doctor     Full Code     Diet    Follow this diet upon discharge: Orders Placed This Encounter      Regular Diet Adult       Significant Results and Procedures   Most Recent 3 CBC's:  Recent Labs   Lab Test 12/08/19  0409 12/07/19  1515 12/07/19  0723 12/07/19  0000   WBC 10.7  --  8.6 11.0   HGB 12.4* 13.3 12.9* 15.5   MCV 94  --  94 93   *  --  151 192     Most Recent 3 BMP's:  Recent Labs   Lab Test 12/07/19  0000      POTASSIUM 4.2    CHLORIDE 104   CO2 25   BUN 23   CR 1.13   ANIONGAP 6   HOWIE 9.3   GLC 99     Most Recent 2 LFT's:  Recent Labs   Lab Test 12/07/19  0000   AST 11   ALT 23   ALKPHOS 48   BILITOTAL 0.9     Most Recent 3 INR's:  Recent Labs   Lab Test 12/07/19  0000   INR 0.97   ,   Results for orders placed or performed during the hospital encounter of 12/06/19   CT Abdomen Pelvis w Contrast    Narrative    EXAM: CT ABDOMEN PELVIS W CONTRAST  LOCATION: [facility]  DATE/TIME: 12/7/2019 12:51 AM    INDICATION: Acute left flank and abdominal pain.  COMPARISON: None.  TECHNIQUE: CT scan of the abdomen and pelvis was performed following injection of IV contrast. Multiplanar reformats were obtained. Dose reduction techniques were used.  CONTRAST: 78mL Isovue-370    FINDINGS:   LOWER CHEST: Groundglass and interstitial infiltrates both lower lobes. Small esophageal hiatal hernia.    HEPATOBILIARY: Mild fatty infiltration of the liver. Cholecystectomy.    PANCREAS: Normal.    SPLEEN: Normal.    ADRENAL GLANDS: Normal.    KIDNEYS/BLADDER: Small bilateral renal cysts. No renal calculi or hydronephrosis.    BOWEL: Extensive sigmoid diverticulosis, without evidence for diverticulitis or bowel obstruction. Normal appendix.    LYMPH NODES: Normal.    VASCULATURE: Atherosclerotic disease of the aorta and iliac arteries.    PELVIC ORGANS: Small amount of fluid right inguinal canal.    OTHER: None.    MUSCULOSKELETAL: Normal.      Impression    IMPRESSION:   1.  Bilateral lower lobe infiltrates concerning for pneumonia.  2.  Small bilateral renal cysts. No evidence for hydronephrosis or findings to account for the patient's left flank pain.  3.  Sigmoid diverticulosis.     CT Chest Pulmonary Embolism w Contrast    Narrative    EXAM: CT CHEST PULMONARY EMBOLISM WITH CONTRAST  LOCATION: [facility]  DATE/TIME: 12/07/2019, 3:50 AM    INDICATION: Left flank pain. Left chest pain. Elevated d-dimer. Shortness of  breath.  COMPARISON: None.  TECHNIQUE: CT angiogram chest during arterial phase injection IV contrast. 2D and 3D MIP reconstructions were performed by the CT technologist. Dose reduction techniques were used.   CONTRAST: 61 mL Isovue-370.    FINDINGS:  ANGIOGRAM CHEST: There are several small bilateral pulmonary emboli involving both lower lobes as well as the right middle lobe. No large central pulmonary embolus. Thoracic aorta is negative for dissection. The heart is overall normal in size. No   definite heart strain.    LUNGS AND PLEURA: There is a small left pleural effusion. There are bilateral lower lobe infiltrates. Probable developing left lower lobe pulmonary infarct. A few bulla in the right upper lobe. Small calcified granuloma in the left upper lobe anteriorly.    MEDIASTINUM/AXILLAE: No lymph node enlargement. There is a large left thyroid nodule measuring 4.9 cm and displacement of the trachea rightward.    UPPER ABDOMEN: The gallbladder is absent. There are bilateral renal cysts.    MUSCULOSKELETAL: Degenerative disease in the spine.      Impression    IMPRESSION:  1.  Bilateral pulmonary emboli.  2.  Probable developing left lower lobe pulmonary infarct. Small left pleural effusion.  3.  Large left thyroid nodule.      [Critical Result: Pulmonary embolism]    Finding was identified on 12/07/2019, 3:59 AM.     Dr. Nash was contacted by me on 12/07/2019 at 4:05 AM and verbalized understanding of the critical result.      US Lower Extremity Venous Duplex Bilateral    Narrative    ULTRASOUND VENOUS LOWER EXTREMITY BILATERAL 12/7/2019 2:24 PM     HISTORY: Rule out deep vein thrombosis, pulmonary embolus, evaluate  clot burden.    COMPARISON: None.    TECHNIQUE: Ultrasound gray scale, Color Doppler flow, and spectral  Doppler waveform analysis performed.    FINDINGS: Extensive deep vein thrombus throughout the entire left leg  extending through the external iliac and common iliac veins. The  right  common femoral, femoral, popliteal, posterior tibial, and peroneal  veins are negative for thrombus.      Impression    IMPRESSION: Extensive left-sided deep vein thrombus throughout the  left lower extremity and left common and external iliac veins.    PHI SCOTT MD   Echocardiogram Complete    Narrative    [id number]  [id number]  [id number]  [id number]^LEVI^TYSON^SIVAN           [facility]  Echocardiography Laboratory  [address] [place] [address]        Name: JUSTIN BELLA  MRN: [id number]  : 1935  Study Date: 2019 02:39 PM  Age: 84 yrs  Gender: Male  Patient Location: [facility]  Reason For Study: Pulmonary Emboli, Palpitations  Ordering Physician: TYSON SIMS  Referring Physician: LLUVIA FERNANDEZ  Performed By: Tyson Finley RDCS     BSA: 1.8 m2  Height: 68 in  Weight: 150 lb  HR: 92  BP: 162/80 mmHg  _____________________________________________________________________________  __        Procedure  Complete Portable Echo Adult. Optison (NDC #0528-2854) given intravenously.  _____________________________________________________________________________  __        Interpretation Summary     Left ventricular systolic function is normal.The visual ejection fraction is  estimated at 55-60%.  The right ventricular systolic function is normal.  Mild aortic root dilatation.  Pulmonary hypertension- RVSP 35 mm hg +RA.  _____________________________________________________________________________  __        Left Ventricle  The left ventricle is normal in size. There is mild concentric left  ventricular hypertrophy. Left ventricular systolic function is normal. The  visual ejection fraction is estimated at 55-60%. Left ventricular diastolic  function is indeterminate. No regional wall motion abnormalities noted.     Right Ventricle  The right ventricle is normal size. The right ventricular systolic function is  normal.     Atria  The left atrium is mildly dilated. Right atrial size is  normal. There is no  color Doppler evidence of an atrial shunt.     Mitral Valve  There is mild mitral annular calcification. There is trace mitral  regurgitation.        Tricuspid Valve  There is trace tricuspid regurgitation. The right ventricular systolic  pressure is approximated at 34.7 mmHg plus the right atrial pressure.  Pulmonary hypertension.     Aortic Valve  The aortic valve is not well visualized. No aortic regurgitation is present.  No aortic stenosis is present.     Pulmonic Valve  The pulmonic valve is not well visualized. There is trace pulmonic valvular  regurgitation. There is no pulmonic valvular stenosis.     Vessels  Mild aortic root dilatation. 3.8 cm. Dilation of the inferior vena cava is  present with normal respiratory variation in diameter.     Pericardium  There is no pericardial effusion.     _____________________________________________________________________________  __  MMode/2D Measurements & Calculations  IVSd: 1.1 cm  LVIDd: 4.2 cm  LVIDs: 3.0 cm  LVPWd: 1.3 cm  FS: 27.9 %  LV mass(C)d: 180.9 grams  LV mass(C)dI: 100.0 grams/m2     Ao root diam: 3.8 cm  LA dimension: 3.9 cm  LA/Ao: 1.0  LVOT diam: 2.0 cm  LVOT area: 3.2 cm2  LA Volume (BP): 66.8 ml  LA Volume Index (BP): 36.9 ml/m2  RWT: 0.62        Doppler Measurements & Calculations  MV E max prabhu: 71.0 cm/sec  MV A max prabhu: 64.4 cm/sec  MV E/A: 1.1  MV dec time: 0.26 sec  Ao V2 max: 148.9 cm/sec  Ao max P.0 mmHg  Ao V2 mean: 112.5 cm/sec  Ao mean P.5 mmHg  Ao V2 VTI: 29.1 cm  BRIDGET(I,D): 2.1 cm2  BRIDGET(V,D): 2.6 cm2     LV V1 max P.0 mmHg  LV V1 max: 122.7 cm/sec  LV V1 VTI: 19.1 cm  SV(LVOT): 61.2 ml  SI(LVOT): 33.8 ml/m2  PA acc time: 0.14 sec  TR max prabhu: 294.4 cm/sec  TR max P.7 mmHg  AV Prabhu Ratio (DI): 0.82  BRIDGET Index (cm2/m2): 1.2           _____________________________________________________________________________  __           Report approved by: Sneha Juan 2019 04:42 PM            Discharge  Medications   Current Discharge Medication List      START taking these medications    Details   amoxicillin-clavulanate (AUGMENTIN) 875-125 MG tablet Take 1 tablet by mouth every 12 hours  Qty: 14 tablet, Refills: 0    Associated Diagnoses: Upper respiratory tract infection, unspecified type      pantoprazole (PROTONIX) 40 MG EC tablet Take 1 tablet (40 mg) by mouth every morning (before breakfast)  Qty: 90 tablet, Refills: 1    Associated Diagnoses: Deep vein thrombosis (DVT) of left lower extremity, unspecified chronicity, unspecified vein (H); Bilateral pulmonary embolism (H)      !! rivaroxaban ANTICOAGULANT (XARELTO ANTICOAGULANT) 20 MG TABS tablet Take 1 tablet (20 mg) by mouth daily (with dinner)  Qty: 90 tablet, Refills: 1    Associated Diagnoses: Deep vein thrombosis (DVT) of left lower extremity, unspecified chronicity, unspecified vein (H); Bilateral pulmonary embolism (H)      !! rivaroxaban ANTICOAGULANT (XARELTO) 15 MG TABS tablet Take 1 tablet (15 mg) by mouth 2 times daily (with meals)  Qty: 42 tablet, Refills: 0    Associated Diagnoses: Deep vein thrombosis (DVT) of left lower extremity, unspecified chronicity, unspecified vein (H); Bilateral pulmonary embolism (H)       !! - Potential duplicate medications found. Please discuss with provider.      CONTINUE these medications which have NOT CHANGED    Details   hydroxychloroquine (PLAQUENIL) 200 MG tablet Take 400 mg by mouth daily       predniSONE (DELTASONE) 5 MG tablet Take 10 mg by mouth daily       travoprost SIENA FREE (TRAVATAN Z) 0.004 % ophthalmic solution Place 1 drop into both eyes every evening            Allergies   No Known Allergies

## 2019-12-09 NOTE — CONSULTS
[facility]    Vascular Medicine Consultation     Attestation: I have examined the patient independently of Kianna España PA-C and agree with the examination and plan as delineated below.    Louie Santos MD      Date of Admission:  12/6/2019  Date of Consult (When I saw the patient): 12/09/19    Assessment & Plan   First lifetime thromboembolic event of left lower extremity DVT and bilateral PE s/p venogram with left common iliac vein stenting 12/8/19    This is the patient's first lifetime thromboembolic event. He traveled to [place] in October. He has had no recent surgeries or trauma. Despite his age of 84, he is extremely active, still teaching music, and travels all over the world. He was recently initiated on hydroxychloroquine by Rheumatology with a possible question of an underlying rheumatic disease causing trouble with his hands. Rheumatologic disease can at times be a risk factor for thrombus formation. He has already been initiated on IV heparin. Therefore, no hypercoagulable work-up will be undertaken at this time.     He denies shortness of breath but continues to have some left sided chest/back pain and hemoptysis, which is improving. He is hemodynamically stable. He was taken to IR on 12/8/19 at an attempt at mechanical thrombectomy to debulk his left lower extremity DVT. However, there was no venous thrombus noted on venogram. This raises the question of a possible duplicated left femoral vein. Left iliac vein stenosis was noted and this was stented.     He has been continued on IV heparin. He could likely transition from Heparin to a DOAC later today. A DOAC would be preferred since INR monitoring would be difficult as he lives in [place] for January and February and then lives in their house in [facility] during March/April.     He will need to follow up with Interventional Radiology and Dr. Santos of Vascular Medicine prior to going down to [place] in  January. He will need a minimum of 6 months of anticoagulation, but possibly more. He should utilize compression stockings (20-30 mmHg thigh high). A prescription will be given to him.       Reason for Consult   Reason for consult: Asked by Dr. Betancur to evaluate, assist with anticoagulation, and establish outpatient follow-up in this very active 84 year old male presenting with his first lifetime thromboembolic event of left lower extremity DVT and PE.     Primary Care Physician   Stewart Kim      History of Present Illness   Alec Boston Jr. is an 84 year old male looking much younger than his states age who presented to the emergency department with 3 days of streaks of blood with coughing episodes now acutely developing left flank pain and more notable hemoptysis when leaving a concert at [employer]. He went straight to the ER and was found to have bilateral pulmonary emboli with a heart rate initially in the 120s range, tachypneic to 18.  CT imaging demonstrates also early left pulmonary infarct, likely explaining patient's left flank pain.  Note that initial work-up was initially concerning for renal stone given patient's location and description of discomfort, though this led to an abdomen/pelvis CT with possible basilar infiltrates.  A d-dimer was sent which was significantly elevated in the 9 range, and subsequent CT PE study resulted with pulmonary infarct and bilateral pulmonary embolism.     He has been hemodynamically stable. Echocardiogram did not show any right heart strain. His pain is well controlled.  He continues to have some hemoptysis with approximately dime size bloody sputum. Lower extremity venous duplex was significant for DVT throughout the entire left leg extending through the external iliac and common iliac veins.     Patient is relatively healthy for an 84-year-old.  He spends 2 months a year in a cottage in [facility] with his partner, spends winter in [place],  teaches piano lessons, and worked with [employer] for over 30 years.  His only medications prior to March 2019 were his eyedrops.  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 Oquendo of Rheumatology, though no clear diagnosis exist currently.  Patient continues to have resolution of his hand/joint symptoms and is starting 5 mg daily of his prednisone while continuing Plaquenil.     Last flight was in October returning from [place].  Patient describes no lower extremity swelling, no upper extremity swelling at that time.  No calf pain.  No family history of clotting disorders.  No personal history of blood clot. No recent trauma.     Given the extent of clot in his left lower extremity, it was felt he would benefit from a mechanical thrombectomy. He was taken to IR on 12/8/19. However, this surprisingly did not show any left lower extremity thrombus. He did have some left iliac vein stenosis, and this was stented.  It's unclear if he has a duplicate venous system on his left or if he cleared all of his thrombus in a night on IV heparin.     He is presently doing well. He still has a small amount of hemoptysis.       Past Medical History   Past Medical History:   Diagnosis Date     Cholelithiasis      Glaucoma      Testicular cancer (H)        Past Surgical History   Past Surgical History:   Procedure Laterality Date     HC LAP,ORCHIECTOMY       INCISION LIMBAL RELAXING, KERATOTOMY ARCUATE  5/6/2013    Procedure: INCISION LIMBAL RELAXING, KERATOTOMY ARCUATE;;  Surgeon: Scott Cruz MD;  Location: [facility]     jaw orif[       LAPAROSCOPIC CHOLECYSTECTOMY       LASER SELECTIVE TRABECULOPLASTY Left 12/19/2016    Procedure: LASER SELECTIVE TRABECULOPLASTY;  Surgeon: Scott Cruz MD;  Location: [facility]     PHACOEMULSIFICATION CLEAR CORNEA WITH STANDARD INTRAOCULAR LENS IMPLANT  5/6/2013    Procedure:  PHACOEMULSIFICATION CLEAR CORNEA WITH STANDARD INTRAOCULAR LENS IMPLANT;  LEFT PHACOEMULSIFICATION CLEAR CORNEA WITH STANDARD INTRAOCULAR LENS IMPLANT, WITH LIMBAL RELAXING INCISION;  Surgeon: Nancy, Scott CLEARY MD;  Location: [facility]       Prior to Admission Medications   Prior to Admission Medications   Prescriptions Last Dose Informant Patient Reported? Taking?   hydroxychloroquine (PLAQUENIL) 200 MG tablet 12/6/2019 at Unknown time Pharmacy Yes Yes   Sig: Take 400 mg by mouth daily    predniSONE (DELTASONE) 5 MG tablet 12/6/2019 at Unknown time Pharmacy Yes Yes   Sig: Take 10 mg by mouth daily    travoprost SIENA FREE (TRAVATAN Z) 0.004 % ophthalmic solution 12/5/2019 at Unknown time Self Yes Yes   Sig: Place 1 drop into both eyes every evening       Facility-Administered Medications: None     Allergies   No Known Allergies    Social History   Alec OLSEN Ludy Limon  reports that he has never smoked. He does not have any smokeless tobacco history on file. He reports current alcohol use. He reports that he does not use drugs. He works as a pianist and concert professional. His partner and him vacation January and February every year in [place] and then March/April in [facility].     Family History   No family history of bleeding or clotting disorders or DVT.     Review of Systems   The 10 point Review of Systems is negative other than noted in the HPI or here.     Physical Exam   Temp: 97  F (36.1  C) Temp src: Oral BP: 135/70 Pulse: 84 Heart Rate: 86 Resp: 16 SpO2: 94 % O2 Device: None (Room air) Oxygen Delivery: 2 LPM  Vital Signs with Ranges  Temp:  [96.8  F (36  C)-98.7  F (37.1  C)] 97  F (36.1  C)  Pulse:  [83-89] 84  Heart Rate:  [83-86] 86  Resp:  [16] 16  BP: (120-138)/(58-72) 135/70  SpO2:  [94 %-96 %] 94 %  156 lbs 0 oz    Constitutional: awake, alert, cooperative, no apparent distress, and appears stated age  Eyes: Lids and lashes normal, pupils equal, round and reactive to light, extra ocular  muscles intact, sclera clear, conjunctiva normal  ENT: normocepalic, without obvious abnormality, oropharynx pink and moist  Hematologic / Lymphatic: no lymphadenopathy  Respiratory: No increased work of breathing, good air exchange, clear to auscultation bilaterally, no crackles or wheezing  Cardiovascular: regular rate and rhythm, normal S1 and S2 and no murmur noted  GI: Normal bowel sounds, soft, non-distended, non-tender  Skin: no redness, warmth, or swelling, no rashes and no lesions  Musculoskeletal: There is no redness, warmth, or swelling of the joints.  Full range of motion noted.  Motor strength is 5 out of 5 all extremities bilaterally.  Tone is normal.  Neurologic: Awake, alert, oriented to name, place and time.  Cranial nerves II-XII are grossly intact.  Motor is 5 out of 5 bilaterally.    Neuropsychiatric:  Normal affect, memory, insight.      Data   Most Recent 3 CBC's:  Recent Labs   Lab Test 12/08/19  0409 12/07/19  1515 12/07/19  0723 12/07/19  0000   WBC 10.7  --  8.6 11.0   HGB 12.4* 13.3 12.9* 15.5   MCV 94  --  94 93   *  --  151 192     Most Recent 3 BMP's:  Recent Labs   Lab Test 12/07/19  0000      POTASSIUM 4.2   CHLORIDE 104   CO2 25   BUN 23   CR 1.13   ANIONGAP 6   HOWIE 9.3   GLC 99     Most Recent 3 INR's:  Recent Labs   Lab Test 12/07/19  0000   INR 0.97     Most Recent Cholesterol Panel:No lab results found.  Most Recent Hemoglobin A1c:No lab results found.

## 2019-12-09 NOTE — PLAN OF CARE
Shift Note: A&O, up with SBA, O2 stable on RA, desatts when off O2, LS diminished, attempted L leg thrombectomy yesterday but no clot was found, a stent was placed for iliac vein stenosis, dressing CDI, afebrile, no hemoptysis, hep gtt running 805ml/hr, Hep10A and PTT to be drawn tomorrow am

## 2019-12-09 NOTE — PROVIDER NOTIFICATION
MD Notification    Person notified: primary hospitalist    Person Name: Dr. Gauthier    Date/Time: 12/9/19 at 1613    Interaction: web-based page    Purpose of Notification: FYI Dr. Santos here seeing patient and states that he can discharge today, if you are able to come help with some discharge orders.    Orders Received:    Comments:

## 2019-12-09 NOTE — CONSULTS
Medication coverage check for Eliquis or Xarelto. $46 monthly copay. Please note that patient may have a deductible (up to $435) to meet in January.  Radha Han CphT  [facility] Liaison  Liaison Cell: [phone]

## 2019-12-09 NOTE — PLAN OF CARE
Afebrile today. L posterior leg CDI after thrombectomy. Up with SBA. No pain or SOB. O2 weaned down. Poor appetite. Heparin infusing. ptt and hep 10a recheck at 2030

## 2019-12-13 LAB
BACTERIA SPEC CULT: NO GROWTH
Lab: NORMAL
Lab: NORMAL
SPECIMEN SOURCE: NORMAL

## 2019-12-23 ENCOUNTER — HOSPITAL ENCOUNTER (OUTPATIENT)
Dept: ULTRASOUND IMAGING | Facility: CLINIC | Age: 84
End: 2019-12-23
Attending: RADIOLOGY
Payer: COMMERCIAL

## 2019-12-23 ENCOUNTER — OFFICE VISIT (OUTPATIENT)
Dept: OTHER | Facility: CLINIC | Age: 84
End: 2019-12-23
Attending: RADIOLOGY
Payer: COMMERCIAL

## 2019-12-23 ENCOUNTER — HOSPITAL ENCOUNTER (OUTPATIENT)
Dept: ULTRASOUND IMAGING | Facility: CLINIC | Age: 84
Discharge: HOME | End: 2019-12-23
Attending: RADIOLOGY | Admitting: RADIOLOGY
Payer: COMMERCIAL

## 2019-12-23 VITALS
OXYGEN SATURATION: 94 % | WEIGHT: 156 LBS | SYSTOLIC BLOOD PRESSURE: 137 MMHG | RESPIRATION RATE: 14 BRPM | DIASTOLIC BLOOD PRESSURE: 77 MMHG | HEART RATE: 60 BPM | HEIGHT: 67 IN | BODY MASS INDEX: 24.48 KG/M2

## 2019-12-23 DIAGNOSIS — I82.402 DEEP VEIN THROMBOSIS (DVT) OF LEFT LOWER EXTREMITY, UNSPECIFIED CHRONICITY, UNSPECIFIED VEIN (H): ICD-10-CM

## 2019-12-23 DIAGNOSIS — R09.89 OTHER SPECIFIED SYMPTOMS AND SIGNS INVOLVING THE CIRCULATORY AND RESPIRATORY SYSTEMS: Primary | ICD-10-CM

## 2019-12-23 DIAGNOSIS — R09.89 OTHER SPECIFIED SYMPTOMS AND SIGNS INVOLVING THE CIRCULATORY AND RESPIRATORY SYSTEMS: ICD-10-CM

## 2019-12-23 PROCEDURE — 93979 VASCULAR STUDY: CPT | Mod: TC

## 2019-12-23 PROCEDURE — 93971 EXTREMITY STUDY: CPT | Mod: LT

## 2019-12-23 PROCEDURE — G0463 HOSPITAL OUTPT CLINIC VISIT: HCPCS | Mod: 25

## 2019-12-23 ASSESSMENT — MIFFLIN-ST. JEOR: SCORE: 1361.24

## 2019-12-23 NOTE — PROGRESS NOTES
"Alec Boston Jr. is a 84 year old male who presents for:  Chief Complaint   Patient presents with     Nurse Visit     (2:00 [facility]; 3:30 [facility]) s/p left CIV stent and ultrasound of left DVT mechanical thrombectomy done on 12/8/2019 2 weeks follow up with Dr. Mak *jaden        Vitals:    Vitals:    12/23/19 1457   BP: 137/77   BP Location: Left arm   Patient Position: Chair   Cuff Size: Adult Regular   Pulse: 60   Resp: 14   SpO2: 94%   Weight: 156 lb (70.8 kg)   Height: 5' 7\" (1.702 m)       BMI:  Estimated body mass index is 24.43 kg/m  as calculated from the following:    Height as of this encounter: 5' 7\" (1.702 m).    Weight as of this encounter: 156 lb (70.8 kg).    Pain Score:  Data Unavailable        Minh Cortez CMA    "

## 2019-12-24 NOTE — PROGRESS NOTES
"Patient Name: Alec Boston Jr.  Patient MRN: [id number]  Patient : 1935    HPI: This is a 84 year old  male presenting for follow-up evaluation for pulmonary embolism with left lower extremity deep vein thrombosis.  The patient presented to the emergency room on 2019 at [facility] with abdominal pain.  A CT was performed which showed bilateral pulmonary embolism.  An bilateral lower extremity venous ultrasound was performed which showed a extensive left lower extremity DVT. It was elected the patient undergo a left leg venogram with mechanical thrombectomy.  The venogram showed resolution of the previously seen deep vein thrombosis throughout the left lower extremity.  There was a compression of the left common iliac vein by the right common iliac artery, consistent with May Thurner.  Successful placement of a self-expanding Venovo 16 x 60 mm into the left common iliac vein.  Completion venogram showed no residual stenosis.     Today, the patient presents to clinic for follow up ultrasound and consult.  He is leaving for [email or web address] on New Year's day.  He is doing well.  No swelling in his left leg.  Denies shortness of breath or chest pain.  He does have significant bruising throughout his left thigh and calf.  It is soft and is not associated with any pain.  He is on Xarelto which is being managed by Dr. Santos.  He is having trouble wearing his compression stockings.  They are very difficult to put on and feels that they are too tight.      He is being followed by Dr. Oquendo of Rheumatology for intermittent swelling of both hands.  He does not clear diagnosis at this time.      OBJECTIVE:   /77 (BP Location: Left arm, Patient Position: Chair, Cuff Size: Adult Regular)   Pulse 60   Resp 14   Ht 5' 7\" (1.702 m)   Wt 156 lb (70.8 kg)   SpO2 94%   BMI 24.43 kg/m      General Appearance: WDWN male in NAD  Lower Extremity: bilateral calf measurements 34 cm.  Ecchymosis of the " left thigh, soft.  No swelling noted.   Patient Active Problem List   Diagnosis     Bilateral pulmonary embolism (H)     Pulmonary emboli (H)     Current Outpatient Medications   Medication     amoxicillin-clavulanate (AUGMENTIN) 875-125 MG tablet     hydroxychloroquine (PLAQUENIL) 200 MG tablet     pantoprazole (PROTONIX) 40 MG EC tablet     predniSONE (DELTASONE) 5 MG tablet     [START ON 12/30/2019] rivaroxaban ANTICOAGULANT (XARELTO ANTICOAGULANT) 20 MG TABS tablet     rivaroxaban ANTICOAGULANT (XARELTO) 15 MG TABS tablet     travoprost SIENA FREE (TRAVATAN Z) 0.004 % ophthalmic solution     No current facility-administered medications for this visit.          Imaging:   Results for orders placed or performed during the hospital encounter of 12/23/19   US Aorta/IVC/Iliac Duplex Limited    Narrative    ULTRASOUND  LOWER EXTREMITY VENOUS DUPLEX LEFT, ULTRASOUND  AORTA/IVC/ILIAC DUPLEX LIMITED  12/23/2019 2:37 PM    CLINICAL HISTORY/INDICATION: Left lower extremity deep vein thrombosis  status post mechanical thrombectomy. Deep vein thrombosis (DVT) of  left lower extremity, unspecified chronicity, unspecified vein (H).    COMPARISON:   12/8/2019, ultrasound 12/7/2019    TECHNIQUE:   Grayscale, color-flow, and spectral waveform analysis were performed  of the deep veins of the left lower extremity    FINDINGS:   Left common iliac vein stent is patent. Left external iliac and common  femoral veins are patent. 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 Nonocclusive thrombus is again noted in the popliteal  vein. Posterior tibial, peroneal and great saphenous veins are patent.  Resolution of thrombus throughout the tibial veins.     The contralateral right common femoral vein demonstrates normal  compressibility, spectral waveform, color flow and augmentation.      Impression    IMPRESSION:   1. Patent left common iliac vein stent.  2. 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  Nonocclusive thrombus is noted in the popliteal vein. Resolution of  tibial and great saphenous vein DVT. Findings suggest patient has  either collateral flow at the level of the femoral vein or a  duplicated system, this is similar to the findings found on recent  venogram.    SOLANGE MAK, DO         Assessment/Plan  This is a pleasant 84 year old gentlemen who is doing well follow placement of left common iliac venous stent.  We discussed that he continues to have chronic occlusive thrombus in the left femoral vein and nonocclusive thrombus is noted in the popliteal vein.  There is resolution of tibial and great saphenous vein DVT.  The findings suggest that either collateral flow at the level of the femoral vein or a duplicated system, this is similar to the findings found on the recent venogram.   Recommendations:  We discussed the use of compression stockings.  I did suggest that he look into ordering on line due to cost.  The patient never had leg swelling to this could be a reasonable option for him.  However, he must wear the prescribed thigh stockings when traveling.      Continue follow up with Dr. Santos.  We will see him in 1 year with repeat imaging of the left common iliac stent.  He is to contact us sooner if develops leg swelling or any concerns.      I met with the patient for 30 minutes of which >50% of the time was used to discuss treatment options and/or coordination of care.    Thank you for the consult. We will continue to monitor this patient for their vascular needs.    Dr. Solange Mak DO  Pager: [phone]  Interventional Radiology   [facility]  [phone]

## 2019-12-24 NOTE — PATIENT INSTRUCTIONS
Continue follow up with Dr. Santos.  Return in 1 year for imaging of the left common iliac stent.  Contact us sooner if concerns.

## 2019-12-31 ENCOUNTER — OFFICE VISIT (OUTPATIENT)
Dept: OTHER | Facility: CLINIC | Age: 84
End: 2019-12-31
Attending: INTERNAL MEDICINE
Payer: COMMERCIAL

## 2019-12-31 VITALS
RESPIRATION RATE: 16 BRPM | BODY MASS INDEX: 24.33 KG/M2 | SYSTOLIC BLOOD PRESSURE: 138 MMHG | WEIGHT: 155 LBS | HEIGHT: 67 IN | DIASTOLIC BLOOD PRESSURE: 78 MMHG | HEART RATE: 89 BPM | OXYGEN SATURATION: 97 %

## 2019-12-31 DIAGNOSIS — I26.99 OTHER ACUTE PULMONARY EMBOLISM WITHOUT ACUTE COR PULMONALE (H): Primary | ICD-10-CM

## 2019-12-31 DIAGNOSIS — I82.422 ACUTE DEEP VEIN THROMBOSIS (DVT) OF ILIAC VEIN OF LEFT LOWER EXTREMITY (H): ICD-10-CM

## 2019-12-31 PROCEDURE — G0463 HOSPITAL OUTPT CLINIC VISIT: HCPCS

## 2019-12-31 PROCEDURE — 99214 OFFICE O/P EST MOD 30 MIN: CPT | Mod: ZP | Performed by: INTERNAL MEDICINE

## 2019-12-31 ASSESSMENT — MIFFLIN-ST. JEOR: SCORE: 1356.71

## 2019-12-31 NOTE — PROGRESS NOTES
"Alec Boston Jr. is a 84 year old male who presents for:  Chief Complaint   Patient presents with     Nurse Visit     hospital f/u LLE DVT & PE 12/9/19        Vitals:    Vitals:    12/31/19 1243   BP: 138/78   BP Location: Right arm   Patient Position: Chair   Cuff Size: Adult Regular   Pulse: 89   Resp: 16   SpO2: 97%   Weight: 155 lb (70.3 kg)   Height: 5' 7\" (1.702 m)       BMI:  Estimated body mass index is 24.28 kg/m  as calculated from the following:    Height as of this encounter: 5' 7\" (1.702 m).    Weight as of this encounter: 155 lb (70.3 kg).    Pain Score:  Data Unavailable        Minh Cortez CMA    "

## 2019-12-31 NOTE — PROGRESS NOTES
Alec Boston Jr. is a 84 year old male who is presenting at the current time to discuss his diagnosi(es) of        Acute deep vein thrombosis (DVT) of iliac vein of left lower extremity (H)  Other acute pulmonary embolism without acute cor pulmonale (H) .      HPI:     Alec Boston Jr. is an 84 year old male looking much younger than his states age who presented to the emergency department on 12/6/19 with 3 days of streaks of blood with coughing episodes and acutely developing left flank pain and more notable hemoptysis when leaving a concert at [employer]. He went straight to the ER and was found to have bilateral pulmonary emboli with a heart rate initially in the 120s range, tachypneic to 18.  CT imaging demonstrated also early left pulmonary infarct, likely explaining patient's left flank pain.  Note that initial work-up was initially concerning for renal stone given patient's location and description of discomfort, though this led to an abdomen/pelvis CT with possible basilar infiltrates.  A d-dimer was sent which was significantly elevated in the 9 range, and subsequent CT PE study resulted with pulmonary infarct and bilateral pulmonary embolism.     He was hemodynamically stable. Echocardiogram did not show any right heart strain. His pain was well controlled.  He continued to have some hemoptysis with approximately dime size bloody sputum. Lower extremity venous duplex was significant for DVT throughout the entire left leg extending through the external iliac and common iliac veins.     Patient is relatively healthy for an 84-year-old.  He spends 2 months a year in a cottage in [facility] with his partner, spends winter in [place], teaches piano lessons, and worked with [employer] for over 30 years.  His only medications prior to March 2019 were his eyedrops.  Most recently he was initiated on prolonged low-dose steroid taper bridging to Plaquenil for what appears to be  seronegative arthritis by Dr. Oquendo of Rheumatology, though no clear diagnosis exist currently.  Patient continues to have resolution of his hand/joint symptoms and is starting 5 mg daily of his prednisone while continuing Plaquenil.     Last flight was in October returning from [place].  Patient describes no lower extremity swelling, no upper extremity swelling at that time.  No calf pain.  No family history of clotting disorders.  No personal history of blood clot. No recent trauma.      Given the extent of clot in his left lower extremity, it was felt he would benefit from a mechanical thrombectomy. He was taken to IR on 12/8/19. However, this surprisingly did not show any left lower extremity thrombus. He did have some left iliac vein stenosis, and this was stented.  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 Repeat duplex confirmed this to be the case.      He is presently doing well since hospital discharge.        Review Of Systems  Skin: negative  Eyes: negative  Ears/Nose/Throat: negative  Respiratory: No shortness of breath, dyspnea on exertion, cough, or hemoptysis  Cardiovascular: negative  Gastrointestinal: negative  Genitourinary: negative  Musculoskeletal: residual; left leg firmness  Neurologic: negative  Psychiatric: negative  Hematologic/Lymphatic/Immunologic: negative  Endocrine: negative     PAST MEDICAL HISTORY:                  Past Medical History:   Diagnosis Date     Cholelithiasis      Glaucoma      Testicular cancer (H)        PAST SURGICAL HISTORY:                  Past Surgical History:   Procedure Laterality Date     HC LAP,ORCHIECTOMY       INCISION LIMBAL RELAXING, KERATOTOMY ARCUATE  5/6/2013    Procedure: INCISION LIMBAL RELAXING, KERATOTOMY ARCUATE;;  Surgeon: Scott Cruz MD;  Location: [facility]     IR LOWER EXTREMITY VENOGRAM LEFT  12/8/2019     jaw orif[       LAPAROSCOPIC CHOLECYSTECTOMY       LASER SELECTIVE TRABECULOPLASTY Left 12/19/2016     Procedure: LASER SELECTIVE TRABECULOPLASTY;  Surgeon: Scott Cruz MD;  Location: [facility]     PHACOEMULSIFICATION CLEAR CORNEA WITH STANDARD INTRAOCULAR LENS IMPLANT  5/6/2013    Procedure: PHACOEMULSIFICATION CLEAR CORNEA WITH STANDARD INTRAOCULAR LENS IMPLANT;  LEFT PHACOEMULSIFICATION CLEAR CORNEA WITH STANDARD INTRAOCULAR LENS IMPLANT, WITH LIMBAL RELAXING INCISION;  Surgeon: Scott Cruz MD;  Location: [facility]       CURRENT MEDICATIONS:                  Current Outpatient Medications   Medication Sig Dispense Refill     amoxicillin-clavulanate (AUGMENTIN) 875-125 MG tablet Take 1 tablet by mouth every 12 hours 14 tablet 0     hydroxychloroquine (PLAQUENIL) 200 MG tablet Take 400 mg by mouth daily        pantoprazole (PROTONIX) 40 MG EC tablet Take 1 tablet (40 mg) by mouth every morning (before breakfast) 90 tablet 1     predniSONE (DELTASONE) 5 MG tablet Take 10 mg by mouth daily        rivaroxaban ANTICOAGULANT (XARELTO ANTICOAGULANT) 20 MG TABS tablet Take 1 tablet (20 mg) by mouth daily (with dinner) 90 tablet 1     rivaroxaban ANTICOAGULANT (XARELTO) 15 MG TABS tablet Take 1 tablet (15 mg) by mouth 2 times daily (with meals) 42 tablet 0     travoprost SIEAN FREE (TRAVATAN Z) 0.004 % ophthalmic solution Place 1 drop into both eyes every evening          ALLERGIES:                No Known Allergies    SOCIAL HISTORY:                  Social History     Socioeconomic History     Marital status: Single     Spouse name: Not on file     Number of children: Not on file     Years of education: Not on file     Highest education level: Not on file   Occupational History     Not on file   Social Needs     Financial resource strain: Not on file     Food insecurity:     Worry: Not on file     Inability: Not on file     Transportation needs:     Medical: Not on file     Non-medical: Not on file   Tobacco Use     Smoking status: Never Smoker     Smokeless tobacco: Never Used   Substance and Sexual Activity      Alcohol use: Yes     Comment: OCC     Drug use: No     Sexual activity: Never   Lifestyle     Physical activity:     Days per week: Not on file     Minutes per session: Not on file     Stress: Not on file   Relationships     Social connections:     Talks on phone: Not on file     Gets together: Not on file     Attends Jainism service: Not on file     Active member of club or organization: Not on file     Attends meetings of clubs or organizations: Not on file     Relationship status: Not on file     Intimate partner violence:     Fear of current or ex partner: Not on file     Emotionally abused: Not on file     Physically abused: Not on file     Forced sexual activity: Not on file   Other Topics Concern     Not on file   Social History Narrative     Not on file       FAMILY HISTORY:                 No family history on file.      Physical exam Reveals:    O/P: WNL  HEENT: WNL  NECK: No JVD, thyromegaly, or lymphadenopathy  HEART: RRR, no murmurs, gallops, or rubs  LUNGS: CTA bilaterally without rales, wheezes, or rhonchi  GI: NABS, nondistended, nontender, soft  EXT:without cyanosis, clubbing, or edema  NEURO: nonfocal  : no flank tenderness    A/P:    (I26.99) Other acute pulmonary embolism without acute cor pulmonale (H)  (primary encounter diagnosis)  Comment: remain ACd 6 months, RTC 6 months.   Plan: D dimer quantitative, US Lower Extremity Venous        Duplex Left, Echocardiogram Complete           (I82.422) First lifetime thromboembolic event of left lower extremity DVT and bilateral PE s/p venogram with left common iliac vein stenting 12/8/19  Comment: as above  Plan: D dimer quantitative, US Lower Extremity Venous        Duplex Left, Echocardiogram Complete          Greater than one half the twenty five minutes total spent on the pt's visit were spent providing education and counselling to the patient regarding the above matters.

## 2020-03-20 DIAGNOSIS — I26.99 BILATERAL PULMONARY EMBOLISM (H): ICD-10-CM

## 2020-03-20 DIAGNOSIS — I82.402 DEEP VEIN THROMBOSIS (DVT) OF LEFT LOWER EXTREMITY, UNSPECIFIED CHRONICITY, UNSPECIFIED VEIN (H): ICD-10-CM

## 2020-04-10 ENCOUNTER — TELEPHONE (OUTPATIENT)
Dept: OTHER | Facility: CLINIC | Age: 85
End: 2020-04-10

## 2020-04-10 NOTE — TELEPHONE ENCOUNTER
Contacted patient who will continue his anticoagulation.  He will call when COVID-19 restrictions are lifted to schedule D dimer, LLE Venous US and complete echo in June.   Office visit one week later.     Recall sheet for June already completed.    Gypsy Womack RN BSN  [facility]  [phone]

## 2020-04-10 NOTE — TELEPHONE ENCOUNTER
----- Message from Louie Santos MD sent at 4/6/2020  8:00 AM CDT -----  Regarding: FW: Urgency for Echo  Echo schedulers:    This can be pushed back to post COVID restrictions. Not urgent or emergent.    Gypsy: Please copy and paste this and below text into a telephone encounter. We will continue the patient on therapeutic AC until after COVID restrictions are lifted, he can get his TTE, and see me face to face to discuss cessation of AC.    CF  ----- Message -----  From: Samantha Benoit  Sent: 4/6/2020   7:32 AM CDT  To: Louie Santos MD  Subject: Urgency for Echo                                 Dear Dr Santos,    In light of the recent changes in Healthcare due to COVID-19 we are requesting your help in reviewing the Cardiology Consult and/or Cardiac Imaging  orders you have placed for [facility].      Test/Consult ordered: Echo  that was ordered on 12/31    Please indicate urgency of test/consult:   1-6 weeks - Urgent                                                                         7-12 weeks - Semi-urgent                                                                  3-6 months - Not urgent    If you have clinical questions regarding the urgency, please call the clinic at [phone] to review with a provider.    Please respond back to p [facility] HEART SCHEDULING with your recommendation.    We will contact the patient regarding these orders.  If they have questions, we will direct them back to you.      We appreciate your assistance with this as we want to meet the needs of each patient while still keeping them and our healthcare workers safe.      Thank you again for your assistance,      [facility]  Scheduling Team          .

## 2020-06-02 ENCOUNTER — TELEPHONE (OUTPATIENT)
Dept: OTHER | Facility: CLINIC | Age: 85
End: 2020-06-02

## 2020-06-02 NOTE — TELEPHONE ENCOUNTER
Patient called to schedule his lab, imaging and follow up in June with Dr. Santos. Patient was scheduled for testing on 6/11/20 and will follow up with Dr. Santos on 6/24/20 (first available).     Patient states that he only has 4 days left of his pantoprazole. Patient also states that he would prefer to stop this medication if possible. If he needs to continue, he will need another refill before he sees Dr. Santos. He can be reached at [phone]

## 2020-06-04 ENCOUNTER — TELEPHONE (OUTPATIENT)
Dept: OTHER | Facility: CLINIC | Age: 85
End: 2020-06-04

## 2020-06-04 NOTE — TELEPHONE ENCOUNTER
[facility]    Who is the name of the provider?:  Tom       What is the location you see this provider at?: [place]     Reason for call:  Has only 3 days left of pantoprazole.  1) Does he need it refilled, or can he go off?  Would rather discontinue.    2) Wearing thigh high compression stockings 24/7, can he be out of them for some time during the day.  3) Has appt 6/24, with tests on 6/11.  Would like appt sooner than 6/24.     Can we leave a detailed message on this number?  YES

## 2020-06-05 NOTE — TELEPHONE ENCOUNTER
Called to relay instructions per Dr. Santos.    Gypsy Womack RN BSN  [facility]  [phone]

## 2020-06-11 ENCOUNTER — HOSPITAL ENCOUNTER (OUTPATIENT)
Dept: CARDIOLOGY | Facility: CLINIC | Age: 85
End: 2020-06-11
Attending: INTERNAL MEDICINE
Payer: COMMERCIAL

## 2020-06-11 ENCOUNTER — HOSPITAL ENCOUNTER (OUTPATIENT)
Dept: ULTRASOUND IMAGING | Facility: CLINIC | Age: 85
End: 2020-06-11
Attending: RADIOLOGY
Payer: COMMERCIAL

## 2020-06-11 ENCOUNTER — TELEPHONE (OUTPATIENT)
Dept: CARDIOLOGY | Facility: CLINIC | Age: 85
End: 2020-06-11

## 2020-06-11 ENCOUNTER — HOSPITAL ENCOUNTER (OUTPATIENT)
Dept: ULTRASOUND IMAGING | Facility: CLINIC | Age: 85
End: 2020-06-11
Attending: INTERNAL MEDICINE
Payer: COMMERCIAL

## 2020-06-11 DIAGNOSIS — I82.422 ACUTE DEEP VEIN THROMBOSIS (DVT) OF ILIAC VEIN OF LEFT LOWER EXTREMITY (H): ICD-10-CM

## 2020-06-11 DIAGNOSIS — I26.99 OTHER ACUTE PULMONARY EMBOLISM WITHOUT ACUTE COR PULMONALE (H): ICD-10-CM

## 2020-06-11 DIAGNOSIS — R09.89 OTHER SPECIFIED SYMPTOMS AND SIGNS INVOLVING THE CIRCULATORY AND RESPIRATORY SYSTEMS: ICD-10-CM

## 2020-06-11 PROCEDURE — 93306 TTE W/DOPPLER COMPLETE: CPT

## 2020-06-11 PROCEDURE — 93979 VASCULAR STUDY: CPT | Mod: TC

## 2020-06-11 PROCEDURE — 93971 EXTREMITY STUDY: CPT | Mod: LT

## 2020-06-11 PROCEDURE — 93306 TTE W/DOPPLER COMPLETE: CPT | Mod: 26 | Performed by: INTERNAL MEDICINE

## 2020-06-11 NOTE — TELEPHONE ENCOUNTER
PATIENT WELLNESS TELEPHONE SCREENING     Step 1: Answer all screening questions.     1. In the past 3 weeks, have you been exposed to someone with a known positive illness below?  COVID-19 (known or suspected): No  Chickenpox: No   Measles: No  Pertussis: No    2. Do you have any of the following new symptoms or symptoms that have started within the past 14 days?   Fever (subjective or >100.0)?: No   A new cough: No   Shortness of breath: No   Chills?No   New loss of taste or smell?No   Generalized body aches?No   New persistent headache?No   New sore throat?No   Nausea, vomiting or diarrhea: No    Step 2: If the patient is positive for symptoms, consult the ordering provider/consult IP to determine if the procedure is deemed necessary and inform the patient you will call them back.     Step 3 . If no symptoms, patient informed of the no visitor policy in place. Yes    Step 4. If positive new symptoms, and the procedure is deemed necessary. Notify your manager/supervisor. The patient must be informed to call the procedural department.  A team member with the appropriate PPE will bring the mask to the patient at door 2. The patient will be brought to the procedural department and registered over the phone.

## 2020-06-24 ENCOUNTER — OFFICE VISIT (OUTPATIENT)
Dept: OTHER | Facility: CLINIC | Age: 85
End: 2020-06-24
Attending: INTERNAL MEDICINE
Payer: COMMERCIAL

## 2020-06-24 VITALS
WEIGHT: 150 LBS | HEIGHT: 67 IN | SYSTOLIC BLOOD PRESSURE: 157 MMHG | DIASTOLIC BLOOD PRESSURE: 89 MMHG | HEART RATE: 74 BPM | RESPIRATION RATE: 16 BRPM | OXYGEN SATURATION: 99 % | BODY MASS INDEX: 23.54 KG/M2

## 2020-06-24 DIAGNOSIS — I26.99 OTHER ACUTE PULMONARY EMBOLISM WITHOUT ACUTE COR PULMONALE (H): ICD-10-CM

## 2020-06-24 DIAGNOSIS — I80.212: Primary | ICD-10-CM

## 2020-06-24 DIAGNOSIS — I82.422 ACUTE DEEP VEIN THROMBOSIS (DVT) OF ILIAC VEIN OF LEFT LOWER EXTREMITY (H): ICD-10-CM

## 2020-06-24 PROCEDURE — G0463 HOSPITAL OUTPT CLINIC VISIT: HCPCS

## 2020-06-24 PROCEDURE — 99214 OFFICE O/P EST MOD 30 MIN: CPT | Mod: ZP | Performed by: INTERNAL MEDICINE

## 2020-06-24 ASSESSMENT — MIFFLIN-ST. JEOR: SCORE: 1329.03

## 2020-06-24 NOTE — PROGRESS NOTES
Alec Boston Jr. is a 85 year old male who is presenting at the current time to discuss his diagnosi(es) of        Acute deep vein thrombosis (DVT) of iliac vein of left lower extremity (H)  Other acute pulmonary embolism without acute cor pulmonale (H) .      HPI:     Alec Boston Jr. is an 84 year old male looking much younger than his states age who presented to the emergency department on 12/6/19 with 3 days of streaks of blood with coughing episodes and acutely developing left flank pain and more notable hemoptysis when leaving a concert at [employer]. He went straight to the ER and was found to have bilateral pulmonary emboli with a heart rate initially in the 120s range, tachypneic to 18.  CT imaging demonstrated also early left pulmonary infarct, likely explaining patient's left flank pain.  Note that initial work-up was initially concerning for renal stone given patient's location and description of discomfort, though this led to an abdomen/pelvis CT with possible basilar infiltrates.  A d-dimer was sent which was significantly elevated in the 9 range, and subsequent CT PE study resulted with pulmonary infarct and bilateral pulmonary embolism.     He was hemodynamically stable. Echocardiogram did not show any right heart strain. His pain was well controlled.  He continued to have some hemoptysis with approximately dime size bloody sputum. Lower extremity venous duplex was significant for DVT throughout the entire left leg extending through the external iliac and common iliac veins.     Patient is relatively healthy for an 84-year-old.  He spends 2 months a year in a cottage in [facility] [facility] with his partner, spends winter in [facility], teaches piano lessons, and worked with [employer] for over 30 years.  His only medications prior to March 2019 were his eyedrops.  Most recently he was initiated on prolonged low-dose steroid taper bridging to Plaquenil for what appears to be  seronegative arthritis by Dr. Oquendo of Rheumatology, though no clear diagnosis exist currently.  Patient continues to have resolution of his hand/joint symptoms and is starting 5 mg daily of his prednisone while continuing Plaquenil.     Last flight was in October returning from [place].  Patient describes no lower extremity swelling, no upper extremity swelling at that time.  No calf pain.  No family history of clotting disorders.  No personal history of blood clot. No recent trauma.      Given the extent of clot in his left lower extremity, it was felt he would benefit from a mechanical thrombectomy. He was taken to IR on 12/8/19. However, this surprisingly did not show any left lower extremity thrombus. He did have some left iliac vein stenosis, and this was stented.  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 Repeat duplex confirmed this to be the case.      He is presently doing well since hospital discharge.     He had repeat TTE done recently revealing persistent mild pulmonary htn, and had LE duplex done revealing persistent DVT in his paired femoral veins. D dimer was not done as ordered.     Review Of Systems  Skin: negative  Eyes: negative  Ears/Nose/Throat: negative  Respiratory: No shortness of breath, dyspnea on exertion, cough, or hemoptysis  Cardiovascular: negative  Gastrointestinal: negative  Genitourinary: negative  Musculoskeletal: negative  Neurologic: negative  Psychiatric: negative  Hematologic/Lymphatic/Immunologic: negative  Endocrine: negative     PAST MEDICAL HISTORY:                  Past Medical History:   Diagnosis Date     Cholelithiasis      Glaucoma      Testicular cancer (H)        PAST SURGICAL HISTORY:                  Past Surgical History:   Procedure Laterality Date     HC LAP,ORCHIECTOMY       INCISION LIMBAL RELAXING, KERATOTOMY ARCUATE  5/6/2013    Procedure: INCISION LIMBAL RELAXING, KERATOTOMY ARCUATE;;  Surgeon: Scott Cruz MD;  Location:   [facility]     IR LOWER EXTREMITY VENOGRAM LEFT  12/8/2019     jaw orif[       LAPAROSCOPIC CHOLECYSTECTOMY       LASER SELECTIVE TRABECULOPLASTY Left 12/19/2016    Procedure: LASER SELECTIVE TRABECULOPLASTY;  Surgeon: Scott Cruz MD;  Location: [facility]     PHACOEMULSIFICATION CLEAR CORNEA WITH STANDARD INTRAOCULAR LENS IMPLANT  5/6/2013    Procedure: PHACOEMULSIFICATION CLEAR CORNEA WITH STANDARD INTRAOCULAR LENS IMPLANT;  LEFT PHACOEMULSIFICATION CLEAR CORNEA WITH STANDARD INTRAOCULAR LENS IMPLANT, WITH LIMBAL RELAXING INCISION;  Surgeon: Scott Cruz MD;  Location: [facility]       CURRENT MEDICATIONS:                  Current Outpatient Medications   Medication Sig Dispense Refill     hydroxychloroquine (PLAQUENIL) 200 MG tablet Take 400 mg by mouth daily        rivaroxaban ANTICOAGULANT (XARELTO ANTICOAGULANT) 20 MG TABS tablet Take 1 tablet (20 mg) by mouth daily (with dinner) 90 tablet 0     travoprost SIENA FREE (TRAVATAN Z) 0.004 % ophthalmic solution Place 1 drop into both eyes every evening        amoxicillin-clavulanate (AUGMENTIN) 875-125 MG tablet Take 1 tablet by mouth every 12 hours (Patient not taking: Reported on 6/24/2020) 14 tablet 0     pantoprazole (PROTONIX) 40 MG EC tablet Take 1 tablet (40 mg) by mouth every morning (before breakfast) (Patient not taking: Reported on 6/24/2020) 90 tablet 1     predniSONE (DELTASONE) 5 MG tablet Take 10 mg by mouth daily          ALLERGIES:                No Known Allergies    SOCIAL HISTORY:                  Social History     Socioeconomic History     Marital status: Single     Spouse name: Not on file     Number of children: Not on file     Years of education: Not on file     Highest education level: Not on file   Occupational History     Not on file   Social Needs     Financial resource strain: Not on file     Food insecurity     Worry: Not on file     Inability: Not on file     Transportation needs     Medical: Not on file     Non-medical: Not on file    Tobacco Use     Smoking status: Never Smoker     Smokeless tobacco: Never Used   Substance and Sexual Activity     Alcohol use: Yes     Comment: OCC     Drug use: No     Sexual activity: Never   Lifestyle     Physical activity     Days per week: Not on file     Minutes per session: Not on file     Stress: Not on file   Relationships     Social connections     Talks on phone: Not on file     Gets together: Not on file     Attends Yazidism service: Not on file     Active member of club or organization: Not on file     Attends meetings of clubs or organizations: Not on file     Relationship status: Not on file     Intimate partner violence     Fear of current or ex partner: Not on file     Emotionally abused: Not on file     Physically abused: Not on file     Forced sexual activity: Not on file   Other Topics Concern     Not on file   Social History Narrative     Not on file       FAMILY HISTORY:                 No family history on file.        Physical exam Reveals:    O/P: WNL  HEENT: WNL  NECK: No JVD, thyromegaly, or lymphadenopathy  HEART: RRR, no murmurs, gallops, or rubs  LUNGS: CTA bilaterally without rales, wheezes, or rhonchi  GI: NABS, nondistended, nontender, soft  EXT:without cyanosis, clubbing, or edema  NEURO: nonfocal  : no flank tenderness    ULTRASOUND US AORTA/IVC/ILIAC DUPLEX LIMITED, US LOWER EXTREMITY  VENOUS DUPLEX LEFT  6/11/2020 2:02 PM     CLINICAL HISTORY/INDICATION: Left lower extremity deep vein thrombosis  status post mechanical thrombectomy. Left common iliac venous stent  placed on 12/6/2019 with Dr. Mak. Other specified symptoms and  signs involving the circulatory and respiratory systems     COMPARISON:   12/23/2019     TECHNIQUE:   Grayscale, color-flow, and spectral waveform analysis were performed  of the deep veins of the abdomen/pelvis and left lower extremity     FINDINGS:   Stable exam with patent left common iliac vein stent with normal  phasic wave forms within the  stents as well as proximal and distal.  The left external iliac vein and common femoral vein are patent and  compressible. There is stable chronic nonocclusive thrombus involving  the proximal femoral vein. Chronic appearing occlusive thrombus within  the mid and distal femoral vein and nonocclusive thrombus within the  popliteal popliteal vein. Posterior tibial, peroneal and great  saphenous veins are patent.     The contralateral right common femoral vein demonstrates normal  compressibility, spectral waveform, color flow and augmentation.                                                                      IMPRESSION:   1. Patent left common iliac vein stent.  2. Persistent chronic appearing occlusive thrombus is noted in the  left femoral veins. Persistent nonocclusive thrombus is noted in the  popliteal vein.      TATA RIDER MD     [facility]  Echocardiography Laboratory  [address]  [address]  [place] [address]  Phone [phone]  Fax [phone]        Name: JUSTIN BELLA  MRN: [id number]  : 1935  Study Date: 2020 12:54 PM  Age: 85 yrs  Gender: Male  Patient Location: [facility]  Reason For Study: Acute deep vein thrombosis (DVT) of iliac vein of left lower  ext  Ordering Physician: PAYAL GRIFFIN  Referring Physician: Stewart Lewis MD  Performed By: Korina Ceballos RDCS     BSA: 1.8 m2  Height: 67 in  Weight: 155 lb  _____________________________________________________________________________  __        Procedure  Complete Echo Adult.  _____________________________________________________________________________  __        Interpretation Summary     The visual ejection fraction is estimated at 60-65%.  There is mild (1+) mitral regurgitation.  There is mild (1+) tricuspid regurgitation.  Right ventricular systolic pressure is elevated, consistent with mild  pulmonary hypertension.  There is mild (1+) aortic  regurgitation.  There is mild to moderate (1-2+) pulmonic valvular regurgitation.  _____________________________________________________________________________  __        Left Ventricle  The left ventricle is normal in size. There is normal left ventricular wall  thickness. Grade I or early diastolic dysfunction. The visual ejection  fraction is estimated at 60-65%. No regional wall motion abnormalities noted.     Right Ventricle  The right ventricle is normal in structure, function and size.     Atria  The left atrium is borderline dilated. Right atrial size is normal. There is  no color Doppler evidence of an atrial shunt.     Mitral Valve  The mitral valve leaflets appear thickened, but open well. There is mild (1+)  mitral regurgitation.     Tricuspid Valve  The tricuspid valve is normal in structure and function. There is mild (1+)  tricuspid regurgitation. Right ventricular systolic pressure is elevated,  consistent with mild pulmonary hypertension.        Aortic Valve  The aortic valve is normal in structure and function. There is mild (1+)  aortic regurgitation.     Pulmonic Valve  There is mild to moderate (1-2+) pulmonic valvular regurgitation.     Vessels  Normal size aorta. Normal size ascending aorta. The inferior vena cava is  normal.     Pericardium  The pericardium appears normal.     Rhythm  Sinus rhythm was noted.     _____________________________________________________________________________  __  MMode/2D Measurements & Calculations  IVSd: 1.1 cm  LVIDd: 4.5 cm  LVIDs: 3.7 cm  LVPWd: 1.2 cm  FS: 18.2 %     LV mass(C)d: 192.0 grams  LV mass(C)dI: 105.8 grams/m2  Ao root diam: 3.5 cm  LA dimension: 4.0 cm  asc Aorta Diam: 3.4 cm  LA/Ao: 1.1  LA Volume Index (BP): 31.4 ml/m2  RWT: 0.54        Doppler Measurements & Calculations  MV E max jayda: 57.0 cm/sec  MV A max jayda: 81.9 cm/sec  MV E/A: 0.70  MV dec slope: 213.3 cm/sec2  MV dec time: 0.27 sec     AI P1/2t: 401.8 msec  TV max P.7 mmHg  PA  acc time: 0.10 sec  PI end-d prabhu: 97.4 cm/sec  TR max prabhu: 267.5 cm/sec  TR max P.7 mmHg  E/E': 10.8  Peak E' Prabhu: 5.3 cm/sec           _____________________________________________________________________________  __           Report approved by: Sneha Anguiano 2020 01:54 PM         A/P:    (I82.422) First lifetime thromboembolic event of left lower extremity DVT and bilateral PE s/p venogram with left common iliac vein stenting 19  Comment: Not resorbed in leg, residual pulmonary htn in lungs  Plan: continue AC, but reduce dose to 10 mg daily, RTC in six months for d dimer , TTE, repeat duplex    (I26.99) Other acute pulmonary embolism without acute cor pulmonale (H)  Comment: as above  P      Greater than one half the twenty five minutes total spent on the pt's visit were spent providing education and counselling to the patient regarding the above matters.

## 2020-06-24 NOTE — PROGRESS NOTES
"Alec Boston Jr. is a 85 year old male who presents for:  Chief Complaint   Patient presents with     RECHECK     Wellness screening complete  VB 6.23.2020 follow up to lab, echo & US done 6/11/20 *eliezer        Vitals:    Vitals:    06/24/20 1542 06/24/20 1544   BP: 125/76 (!) 157/89   BP Location: Right arm Left arm   Patient Position: Chair Chair   Cuff Size: Adult Regular Adult Regular   Pulse: 74    Resp: 16    SpO2: 99%    Weight: 150 lb (68 kg)    Height: 5' 7\" (1.702 m)        BMI:  Estimated body mass index is 23.49 kg/m  as calculated from the following:    Height as of this encounter: 5' 7\" (1.702 m).    Weight as of this encounter: 150 lb (68 kg).    Pain Score:  Data Unavailable        Minh Cortez CMA    "

## 2020-06-26 NOTE — NURSING NOTE
STIVEN from Dr. Santos for Xarelto 10 mg PO daily with supper.   Patient to decrease from Xarelto 20 mg daily.    April RODARTEN, RN    [facility]  Office: [phone]  Fax: [phone]

## 2020-07-23 ENCOUNTER — TELEPHONE (OUTPATIENT)
Dept: OTHER | Facility: CLINIC | Age: 85
End: 2020-07-23

## 2020-07-23 NOTE — TELEPHONE ENCOUNTER
[facility]    Who is the name of the provider?:  Tom      What is the location you see this provider at?: [place]    Reason for call:  1) Test Results 2) Med question    Can we leave a detailed message on this number?  YES

## 2020-07-24 DIAGNOSIS — I82.422 ACUTE DEEP VEIN THROMBOSIS (DVT) OF ILIAC VEIN OF LEFT LOWER EXTREMITY (H): ICD-10-CM

## 2020-07-24 DIAGNOSIS — I26.99 OTHER ACUTE PULMONARY EMBOLISM WITHOUT ACUTE COR PULMONALE (H): ICD-10-CM

## 2020-07-24 NOTE — TELEPHONE ENCOUNTER
Unable to refill per protocol.  Med and pharm loaded.    April RODARTEN, RN    [facility]  [facility]  Office: [phone]  Fax: [phone]

## 2020-07-24 NOTE — TELEPHONE ENCOUNTER
I called patient and discussed his last office visit with Dr. Santos- OV notes placed in mail per patient requested.  Refill for Adalrelto sent to [facility] for approval.    April MALDONADO, RN    [facility]  [facility]  Office: [phone]  Fax: [phone]

## 2020-12-10 ENCOUNTER — HOSPITAL ENCOUNTER (OUTPATIENT)
Dept: CARDIOLOGY | Facility: CLINIC | Age: 85
Discharge: HOME | End: 2020-12-10
Attending: INTERNAL MEDICINE | Admitting: INTERNAL MEDICINE
Payer: COMMERCIAL

## 2020-12-10 DIAGNOSIS — I26.99 OTHER ACUTE PULMONARY EMBOLISM WITHOUT ACUTE COR PULMONALE (H): ICD-10-CM

## 2020-12-10 PROCEDURE — 93306 TTE W/DOPPLER COMPLETE: CPT | Mod: 26 | Performed by: INTERNAL MEDICINE

## 2020-12-10 PROCEDURE — 999N000208 ECHOCARDIOGRAM COMPLETE

## 2020-12-10 PROCEDURE — 255N000002 HC RX 255 OP 636: Performed by: INTERNAL MEDICINE

## 2020-12-10 RX ADMIN — HUMAN ALBUMIN MICROSPHERES AND PERFLUTREN 9 ML: 10; .22 INJECTION, SOLUTION INTRAVENOUS at 14:20

## 2020-12-14 ENCOUNTER — HOSPITAL ENCOUNTER (OUTPATIENT)
Dept: ULTRASOUND IMAGING | Facility: CLINIC | Age: 85
End: 2020-12-14
Attending: INTERNAL MEDICINE
Payer: COMMERCIAL

## 2020-12-14 DIAGNOSIS — I82.422 ACUTE DEEP VEIN THROMBOSIS (DVT) OF ILIAC VEIN OF LEFT LOWER EXTREMITY (H): ICD-10-CM

## 2020-12-14 DIAGNOSIS — I80.212: ICD-10-CM

## 2020-12-14 PROCEDURE — 93979 VASCULAR STUDY: CPT

## 2020-12-14 PROCEDURE — 93971 EXTREMITY STUDY: CPT | Mod: LT

## 2020-12-17 DIAGNOSIS — I26.99 OTHER ACUTE PULMONARY EMBOLISM WITHOUT ACUTE COR PULMONALE (H): ICD-10-CM

## 2020-12-17 DIAGNOSIS — I82.422 ACUTE DEEP VEIN THROMBOSIS (DVT) OF ILIAC VEIN OF LEFT LOWER EXTREMITY (H): ICD-10-CM

## 2020-12-17 LAB — D DIMER PPP FEU-MCNC: 0.3 UG/ML FEU (ref 0–0.5)

## 2020-12-17 PROCEDURE — 36415 COLL VENOUS BLD VENIPUNCTURE: CPT | Performed by: INTERNAL MEDICINE

## 2020-12-17 PROCEDURE — 85379 FIBRIN DEGRADATION QUANT: CPT | Performed by: INTERNAL MEDICINE

## 2020-12-21 ENCOUNTER — OFFICE VISIT (OUTPATIENT)
Dept: OTHER | Facility: CLINIC | Age: 85
End: 2020-12-21
Attending: INTERNAL MEDICINE
Payer: COMMERCIAL

## 2020-12-21 VITALS
WEIGHT: 141 LBS | SYSTOLIC BLOOD PRESSURE: 137 MMHG | DIASTOLIC BLOOD PRESSURE: 79 MMHG | HEIGHT: 67 IN | OXYGEN SATURATION: 100 % | RESPIRATION RATE: 18 BRPM | HEART RATE: 93 BPM | BODY MASS INDEX: 22.13 KG/M2

## 2020-12-21 DIAGNOSIS — E04.1 THYROID NODULE: ICD-10-CM

## 2020-12-21 DIAGNOSIS — I82.422 ACUTE DEEP VEIN THROMBOSIS (DVT) OF ILIAC VEIN OF LEFT LOWER EXTREMITY (H): Primary | ICD-10-CM

## 2020-12-21 DIAGNOSIS — I26.99 OTHER ACUTE PULMONARY EMBOLISM WITHOUT ACUTE COR PULMONALE (H): ICD-10-CM

## 2020-12-21 DIAGNOSIS — R63.4 UNINTENTIONAL WEIGHT LOSS: ICD-10-CM

## 2020-12-21 PROCEDURE — G0463 HOSPITAL OUTPT CLINIC VISIT: HCPCS

## 2020-12-21 PROCEDURE — 99215 OFFICE O/P EST HI 40 MIN: CPT | Performed by: INTERNAL MEDICINE

## 2020-12-21 ASSESSMENT — MIFFLIN-ST. JEOR: SCORE: 1288.2

## 2020-12-21 NOTE — PROGRESS NOTES
"Alec Boston Jr. is a 85 year old male who presents for:  Chief Complaint   Patient presents with     RECHECK      Echo done 12/10/20 - Ultrasounds done 12/14/20 - D-dimer done 12/17/20 - follow up to 06/24/20 *LMB 11/18/20        Vitals:    Vitals:    12/21/20 1240 12/21/20 1242   BP: (!) 150/50 137/79   BP Location: Right arm Left arm   Patient Position: Chair Chair   Cuff Size: Adult Regular Adult Regular   Pulse: 93    Resp: 18    SpO2: 100%    Weight: 141 lb (64 kg)    Height: 5' 7\" (1.702 m)        BMI:  Estimated body mass index is 22.08 kg/m  as calculated from the following:    Height as of this encounter: 5' 7\" (1.702 m).    Weight as of this encounter: 141 lb (64 kg).    Pain Score:  Data Unavailable        Minh Cortez CMA    "

## 2020-12-21 NOTE — PROGRESS NOTES
Alec Boston Jr. is a 85 year old male who is presenting at the current time to discuss his diagnosi(es) of        Acute deep vein thrombosis (DVT) of iliac vein of left lower extremity (H)  Other acute pulmonary embolism without acute cor pulmonale (H)    HPI:     Alec Boston Jr. is an 84 year old male looking much younger than his states age who presented to the emergency department on 12/6/19 with 3 days of streaks of blood with coughing episodes and acutely developing left flank pain and more notable hemoptysis when leaving a concert at [employer]. He went straight to the ER and was found to have bilateral pulmonary emboli with a heart rate initially in the 120s range, tachypneic to 18.  CT imaging demonstrated also early left pulmonary infarct, likely explaining patient's left flank pain.  Note that initial work-up was initially concerning for renal stone given patient's location and description of discomfort, though this led to an abdomen/pelvis CT with possible basilar infiltrates.  A d-dimer was sent which was significantly elevated in the 9 range, and subsequent CT PE study resulted with pulmonary infarct and bilateral pulmonary embolism.     He was hemodynamically stable. Echocardiogram did not show any right heart strain. His pain was well controlled.  He continued to have some hemoptysis with approximately dime size bloody sputum. Lower extremity venous duplex was significant for DVT throughout the entire left leg extending through the external iliac and common iliac veins.     Patient is relatively healthy for an 84-year-old.  He spends 2 months a year in a cottage in [facility] with his partner, spends winter in [place], teaches piano lessons, and worked with [employer] for over 30 years.  His only medications prior to March 2019 were his eyedrops.  Most recently he was initiated on prolonged low-dose steroid taper bridging to Plaquenil for what appears to be  seronegative arthritis by Dr. Oquendo of Rheumatology, though no clear diagnosis exist currently.  Patient continues to have resolution of his hand/joint symptoms and is starting 5 mg daily of his prednisone while continuing Plaquenil.     Last flight was in October, 2019 returning from [place].  Patient describes no lower extremity swelling, no upper extremity swelling at that time.  No calf pain.  No family history of clotting disorders.  No personal history of blood clot. No recent trauma.      Given the extent of clot in his left lower extremity, it was felt he would benefit from a mechanical thrombectomy. He was taken to IR on 12/8/19. However, this surprisingly did not show any left lower extremity thrombus. He did have some left iliac vein stenosis, and this was stented.  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 Repeat duplex confirmed this to be the case.      He is presently doing well since hospital discharge.      He had repeat TTE done recently revealing persistent mild pulmonary htn, and had LE duplex done revealing persistent DVT in his paired femoral veins. D dimer was not done as ordered.     When last seen on 6/24/2020,  he was noted to have DVT which had not resorbed in leg, and was found to have  residual pulmonary htn in lungs. We therefore continued AC, but reduced the dose to 10 mg daily. He is returning now six months for later to go over results of d dimer , TTE, repeat duplex. His most recent d dimer is normal, down from 9.2. His duplex reveals persistent chronic-appearing occlusive thrombus throughout the left femoral vein, and nonocclusive thrombus in the central portion of the posterior tibial vein. He has mild to moderate MR, mild TR, normal LVEF, mild to moderate pulmonary htn. He is tolerating AC without clinically significant  bruising or bleeding.      He does report 15 # unintentional weight loss, and denies having seen his PCP about his thyroid nodule as  instructed to do by Dr. Duval on his 12/2019 discharge summary.          Review Of Systems  Skin: negative  Eyes: negative  Ears/Nose/Throat: negative  Respiratory: No shortness of breath, dyspnea on exertion, cough, or hemoptysis  Cardiovascular: negative  Gastrointestinal: negative  Genitourinary: negative  Musculoskeletal: negative  Neurologic: negative  Psychiatric: negative  Hematologic/Lymphatic/Immunologic: negative  Endocrine: negative    PAST MEDICAL HISTORY:                  Past Medical History:   Diagnosis Date     Cholelithiasis      Glaucoma      Testicular cancer (H)        PAST SURGICAL HISTORY:                  Past Surgical History:   Procedure Laterality Date     HC LAP,ORCHIECTOMY       INCISION LIMBAL RELAXING, KERATOTOMY ARCUATE  5/6/2013    Procedure: INCISION LIMBAL RELAXING, KERATOTOMY ARCUATE;;  Surgeon: Scott Cruz MD;  Location: [facility]     IR LOWER EXTREMITY VENOGRAM LEFT  12/8/2019     jaw orif[       LAPAROSCOPIC CHOLECYSTECTOMY       LASER SELECTIVE TRABECULOPLASTY Left 12/19/2016    Procedure: LASER SELECTIVE TRABECULOPLASTY;  Surgeon: Scott Cruz MD;  Location: [facility]     PHACOEMULSIFICATION CLEAR CORNEA WITH STANDARD INTRAOCULAR LENS IMPLANT  5/6/2013    Procedure: PHACOEMULSIFICATION CLEAR CORNEA WITH STANDARD INTRAOCULAR LENS IMPLANT;  LEFT PHACOEMULSIFICATION CLEAR CORNEA WITH STANDARD INTRAOCULAR LENS IMPLANT, WITH LIMBAL RELAXING INCISION;  Surgeon: Scott Cruz MD;  Location: [facility]       CURRENT MEDICATIONS:                  Current Outpatient Medications   Medication Sig Dispense Refill     hydroxychloroquine (PLAQUENIL) 200 MG tablet Take 150 mg by mouth daily        rivaroxaban ANTICOAGULANT (XARELTO ANTICOAGULANT) 10 MG TABS tablet Take 1 tablet (10 mg) by mouth daily (with dinner) (Patient taking differently: Take 20 mg by mouth daily (with dinner) ) 90 tablet 1     rivaroxaban ANTICOAGULANT (XARELTO ANTICOAGULANT) 20 MG TABS tablet Take 1 tablet (20  mg) by mouth daily (with dinner) 90 tablet 0     travoprost SIENA FREE (TRAVATAN Z) 0.004 % ophthalmic solution Place 1 drop into both eyes every evening        amoxicillin-clavulanate (AUGMENTIN) 875-125 MG tablet Take 1 tablet by mouth every 12 hours (Patient not taking: Reported on 12/21/2020) 14 tablet 0     pantoprazole (PROTONIX) 40 MG EC tablet Take 1 tablet (40 mg) by mouth every morning (before breakfast) (Patient not taking: Reported on 12/21/2020) 90 tablet 1     predniSONE (DELTASONE) 5 MG tablet Take 10 mg by mouth daily          ALLERGIES:                No Known Allergies    SOCIAL HISTORY:                  Social History     Socioeconomic History     Marital status: Single     Spouse name: Not on file     Number of children: Not on file     Years of education: Not on file     Highest education level: Not on file   Occupational History     Not on file   Social Needs     Financial resource strain: Not on file     Food insecurity     Worry: Not on file     Inability: Not on file     Transportation needs     Medical: Not on file     Non-medical: Not on file   Tobacco Use     Smoking status: Never Smoker     Smokeless tobacco: Never Used   Substance and Sexual Activity     Alcohol use: Yes     Comment: OCC     Drug use: No     Sexual activity: Never   Lifestyle     Physical activity     Days per week: Not on file     Minutes per session: Not on file     Stress: Not on file   Relationships     Social connections     Talks on phone: Not on file     Gets together: Not on file     Attends Christianity service: Not on file     Active member of club or organization: Not on file     Attends meetings of clubs or organizations: Not on file     Relationship status: Not on file     Intimate partner violence     Fear of current or ex partner: Not on file     Emotionally abused: Not on file     Physically abused: Not on file     Forced sexual activity: Not on file   Other Topics Concern     Not on file   Social History  Narrative     Not on file       FAMILY HISTORY:                 No family history on file.      Physical exam Reveals:    O/P: WNL  HEENT: WNL  NECK: No JVD, thyromegaly, or lymphadenopathy  HEART: RRR, no murmurs, gallops, or rubs  LUNGS: CTA bilaterally without rales, wheezes, or rhonchi  GI: NABS, nondistended, nontender, soft  EXT:without cyanosis, clubbing, or edema  NEURO: nonfocal  : no flank tenderness    Component      Latest Ref Rng & Units 12/7/2019 12/17/2020   D-Dimer      0.0 - 0.50 ug/ml FEU 9.2 (H) 0.3         ULTRASOUND LOWER EXTREMITY VENOUS DUPLEX LEFT  12/14/2020 10:11 AM     CLINICAL HISTORY/INDICATION: Acute deep vein thrombosis (DVT) of iliac  vein of left lower extremity (H).     COMPARISON: 12/7/2019, 12/8/2019, 12/23/2019 and 6/11/2020     TECHNIQUE:   Grayscale, color-flow, and spectral waveform analysis were performed  of the deep veins of the left lower extremity     FINDINGS:   The left common femoral vein, femoral vein and popliteal vein  demonstrate normal compressibility, spectral waveform, color flow and  augmentation.       No change in chronic occlusive thrombus through the left femoral vein  and nonocclusive chronic-appearing thrombus in the central portion of  the left central portion of the posterior tibial vein. The common  femoral, external iliac, popliteal and peroneal veins are patent.  There is a collateral vein from the popliteal vein to the common  femoral vein.     The left posterior tibial vein, peroneal vein, and greater saphenous  vein are compressible.     The contralateral right common femoral vein demonstrates normal  compressibility, spectral waveform, color flow and augmentation.                                                                      IMPRESSION:   1. Persistent chronic-appearing occlusive thrombus throughout the left  femoral vein. Nonocclusive thrombus in the central portion of the  posterior tibial vein.     LINA THOMAS,      [id number]  [id number]  [id number]  [id number]^GABY^PAYAL^BIPIN        [facility]  [facility]ch[facility]  [address]  [address]  [place] [address]  Phone [phone]  Fax [phone]        Name: JUSTIN BELLA  MRN: [id number]  : 1935  Study Date: 12/10/2020 01:48 PM  Age: 85 yrs  Gender: Male  Patient Location: [facility]  Reason For Study: Other acute pulmonary embolism without acute cor pulmonale  (H)  Ordering Physician: PAYAL GRIFFIN  Referring Physician: Stewart Kim  Performed By: Cash Fraga RDCS     BSA: 1.8 m2  Height: 67 in  Weight: 150 lb  HR: 78  BP: 161/85 mmHg  _____________________________________________________________________________  __     Procedure  Complete Echo Adult. Optison (NDC #[id number]) given intravenously.     _____________________________________________________________________________  __        Interpretation Summary     1. Mild to moderate mitral valve regurgitation. 2 jets. The predominant jet is  eccentric and posteriorly directed. /85 mmHg.  2. The jet is late systolic but there is no obvious prolapse of the mitral  valve leaflets.  3. Mildly dilated left atrium.  4. Normal left ventricular size and systolic function. Estimated LVEF 55-60%.  5. Normal right ventricular size and systolic function.  6. Mild tricuspid valve regurgitation. The right ventricular systolic pressure  is approximated at 29.2 mmHg plus the right atrial pressure.     No significant changes compared to previous study dated 2020.  _____________________________________________________________________________  __        Left Ventricle  The left ventricle is normal in size. There is normal left ventricular wall  thickness. Left ventricular systolic function is normal. The visual ejection  fraction is estimated at 55-60%. Grade I or early diastolic dysfunction. No  regional wall motion abnormalities  noted.     Right Ventricle  The right ventricle is normal in size and function.     Atria  The left atrium is mildly dilated. Right atrial size is normal. There is no  color Doppler evidence of an atrial shunt.     Mitral Valve  The mitral valve leaflets appear normal. There is no evidence of stenosis,  fluttering, or prolapse. There is mild to moderate (1-2+) mitral  regurgitation.     Tricuspid Valve  Normal tricuspid valve. There is mild (1+) tricuspid regurgitation. The right  ventricular systolic pressure is approximated at 29.2 mmHg plus the right  atrial pressure.        Aortic Valve  The aortic valve is trileaflet. There is mild (1+) aortic regurgitation. No  aortic stenosis is present.     Pulmonic Valve  Normal pulmonic valve. There is trace pulmonic valvular regurgitation.     Vessels  The aortic root is normal size. Normal size ascending aorta. The inferior vena  cava is normal.     Pericardium  There is no pericardial effusion.     Rhythm  Sinus rhythm was noted.     _____________________________________________________________________________  __  MMode/2D Measurements & Calculations  IVSd: 1.1 cm  LVIDd: 5.0 cm  LVIDs: 3.8 cm  LVPWd: 0.87 cm  FS: 25.4 %  LV mass(C)d: 178.0 grams  LV mass(C)dI: 99.5 grams/m2  Ao root diam: 3.7 cm  LA dimension: 3.8 cm     asc Aorta Diam: 3.3 cm  LA/Ao: 1.0  LA Volume (BP): 65.8 ml  LA Volume Index (BP): 36.8 ml/m2  RWT: 0.35        Doppler Measurements & Calculations  MV E max jayda: 66.8 cm/sec  MV A max jayda: 99.4 cm/sec  MV E/A: 0.67  MV dec slope: 346.7 cm/sec2  AI P1/2t: 467.0 msec     MR ERO: 0.15 cm2  PA acc time: 0.14 sec  TR max jayda: 270.2 cm/sec  TR max P.2 mmHg  E/E' av.4  Lateral E/e': 11.6  Medial E/e': 11.2           _____________________________________________________________________________  __           Report approved by: Dr Maeve Taylor 12/10/2020 02:59 PM           A/P:    (I82.422) First lifetime thromboembolic event of left lower  extremity DVT and bilateral PE s/p venogram with left common iliac vein stenting 12/8/19  (primary encounter diagnosis)  Comment: His most recent d dimer is normal, down from 9.2. His duplex reveals persistent chronic-appearing occlusive thrombus throughout the left femoral vein, and nonocclusive thrombus in the central portion of the posterior tibial vein. He has mild to moderate MR, mild TR, normal LVEF, mild to moderate pulmonary htn. He is tolerating AC without clinically significant  bruising or bleeding. Continue Xarleto due to pulmonary htn. Recheck d diemr, LLE duplex, TTE in one year, RTC at that itme to disucss need for continued indefnite Xarleto AC.  Plan: rivaroxaban ANTICOAGULANT (XARELTO         ANTICOAGULANT) 10 MG TABS tablet           (I26.99) Other acute pulmonary embolism without acute cor pulmonale (H)  Comment: as above  Plan: rivaroxaban ANTICOAGULANT (XARELTO         ANTICOAGULANT) 10 MG TABS tablet           (R63.4) Unintentional 15 # weight loss  Comment: check the below as he did not f/u upon his thyroid nodule as directed by Dr. Gauthier.  Plan: TSH, T4 free, T3 Free, US Thyroid, Thyroid         peroxidase antibody, Anti thyroglobulin         antibody, Thyroid stimulating immunoglobulin,         NM Thyroid Uptake and Scan           (E04.1) Thyroid nodule  Comment: as abvoe  Plan: TSH, T4 free, T3 Free, US Thyroid, Thyroid         peroxidase antibody, Anti thyroglobulin         antibody, Thyroid stimulating immunoglobulin,         NM Thyroid Uptake and Scan             Greater than one half the fortyfive minutes total spent on the pt's visit were spent providing education and counselling to the patient regarding the above matters.   Multiple questions answered, hence the longer time.

## 2020-12-28 ENCOUNTER — HOSPITAL ENCOUNTER (OUTPATIENT)
Dept: NUCLEAR MEDICINE | Facility: CLINIC | Age: 85
Setting detail: NUCLEAR MEDICINE
End: 2020-12-28
Attending: INTERNAL MEDICINE
Payer: COMMERCIAL

## 2020-12-28 ENCOUNTER — HOSPITAL ENCOUNTER (OUTPATIENT)
Dept: ULTRASOUND IMAGING | Facility: CLINIC | Age: 85
End: 2020-12-28
Attending: INTERNAL MEDICINE
Payer: COMMERCIAL

## 2020-12-28 DIAGNOSIS — R63.4 UNINTENTIONAL WEIGHT LOSS: ICD-10-CM

## 2020-12-28 DIAGNOSIS — E04.1 THYROID NODULE: ICD-10-CM

## 2020-12-28 PROCEDURE — 76536 US EXAM OF HEAD AND NECK: CPT

## 2020-12-28 PROCEDURE — A9516 IODINE I-123 SOD IODIDE MIC: HCPCS | Performed by: INTERNAL MEDICINE

## 2020-12-28 PROCEDURE — 343N000001 HC RX 343: Performed by: INTERNAL MEDICINE

## 2020-12-28 PROCEDURE — 78014 THYROID IMAGING W/BLOOD FLOW: CPT

## 2020-12-28 RX ADMIN — Medication 247 UCI.: at 13:10

## 2020-12-29 ENCOUNTER — HOSPITAL ENCOUNTER (OUTPATIENT)
Dept: NUCLEAR MEDICINE | Facility: CLINIC | Age: 85
Setting detail: NUCLEAR MEDICINE
End: 2020-12-29
Attending: INTERNAL MEDICINE
Payer: COMMERCIAL

## 2021-01-04 DIAGNOSIS — R63.4 UNINTENTIONAL WEIGHT LOSS: ICD-10-CM

## 2021-01-04 DIAGNOSIS — E04.1 THYROID NODULE: ICD-10-CM

## 2021-01-04 LAB — T3FREE SERPL-MCNC: 2.1 PG/ML (ref 2.3–4.2)

## 2021-01-04 PROCEDURE — 86800 THYROGLOBULIN ANTIBODY: CPT | Performed by: INTERNAL MEDICINE

## 2021-01-04 PROCEDURE — 84481 FREE ASSAY (FT-3): CPT | Performed by: INTERNAL MEDICINE

## 2021-01-04 PROCEDURE — 84443 ASSAY THYROID STIM HORMONE: CPT | Performed by: INTERNAL MEDICINE

## 2021-01-04 PROCEDURE — 84439 ASSAY OF FREE THYROXINE: CPT | Performed by: INTERNAL MEDICINE

## 2021-01-04 PROCEDURE — 99000 SPECIMEN HANDLING OFFICE-LAB: CPT | Performed by: INTERNAL MEDICINE

## 2021-01-04 PROCEDURE — 86376 MICROSOMAL ANTIBODY EACH: CPT | Performed by: INTERNAL MEDICINE

## 2021-01-04 PROCEDURE — 36415 COLL VENOUS BLD VENIPUNCTURE: CPT | Performed by: INTERNAL MEDICINE

## 2021-01-04 PROCEDURE — 84445 ASSAY OF TSI GLOBULIN: CPT | Mod: 90 | Performed by: INTERNAL MEDICINE

## 2021-01-05 LAB
T4 FREE SERPL-MCNC: 0.87 NG/DL (ref 0.76–1.46)
THYROGLOB AB SERPL IA-ACNC: 168 IU/ML (ref 0–40)
THYROPEROXIDASE AB SERPL-ACNC: 145 IU/ML
TSH SERPL DL<=0.005 MIU/L-ACNC: 1.06 MU/L (ref 0.4–4)

## 2021-01-08 LAB — TSI SER-ACNC: 1.2 TSI INDEX

## 2021-01-13 ENCOUNTER — TELEPHONE (OUTPATIENT)
Dept: OTHER | Facility: CLINIC | Age: 86
End: 2021-01-13

## 2021-01-13 NOTE — TELEPHONE ENCOUNTER
Called patient, converted to virtual appointment due to patient concerns regarding COVID-19.    Gypsy Womack RN BSN  [facility]  [phone]

## 2021-01-14 ENCOUNTER — VIRTUAL VISIT (OUTPATIENT)
Dept: OTHER | Facility: CLINIC | Age: 86
End: 2021-01-14
Attending: INTERNAL MEDICINE
Payer: COMMERCIAL

## 2021-01-14 DIAGNOSIS — I82.422 ACUTE DEEP VEIN THROMBOSIS (DVT) OF ILIAC VEIN OF LEFT LOWER EXTREMITY (H): ICD-10-CM

## 2021-01-14 DIAGNOSIS — E06.3 HASHIMOTO'S THYROIDITIS: Primary | ICD-10-CM

## 2021-01-14 DIAGNOSIS — I27.20 PULMONARY HYPERTENSION (H): ICD-10-CM

## 2021-01-14 PROCEDURE — 99214 OFFICE O/P EST MOD 30 MIN: CPT | Mod: 95 | Performed by: INTERNAL MEDICINE

## 2021-01-14 RX ORDER — LEVOTHYROXINE SODIUM 50 UG/1
50 TABLET ORAL DAILY
Qty: 90 TABLET | Refills: 3 | Status: SHIPPED | OUTPATIENT
Start: 2021-01-14 | End: 2021-12-31

## 2021-01-14 NOTE — PROGRESS NOTES
Alec is a 85 year old who is being evaluated via a billable video visit.      How would you like to obtain your AVS? Mail a copy  If the video visit is dropped, the invitation should be resent by: Text to cell phone: [phone]   Will anyone else be joining your video visit? No      Video Start Time:

## 2021-01-14 NOTE — PROGRESS NOTES
Alec Boston Jr. is a 85 year old male who is presenting at the current time to discuss his diagnosi(es) of        Hashimoto's thyroiditis  Acute deep vein thrombosis (DVT) of iliac vein of left lower extremity (H)  Pulmonary hypertension (H)  .    HPI:     Alec Boston Jr. is an 84 year old male looking much younger than his states age who presented to the emergency department on 12/6/19 with 3 days of streaks of blood with coughing episodes and acutely developing left flank pain and more notable hemoptysis when leaving a concert at [employer]. He went straight to the ER and was found to have bilateral pulmonary emboli with a heart rate initially in the 120s range, tachypneic to 18.  CT imaging demonstrated also early left pulmonary infarct, likely explaining patient's left flank pain.  Note that initial work-up was initially concerning for renal stone given patient's location and description of discomfort, though this led to an abdomen/pelvis CT with possible basilar infiltrates.  A d-dimer was sent which was significantly elevated in the 9 range, and subsequent CT PE study resulted with pulmonary infarct and bilateral pulmonary embolism.     He was hemodynamically stable. Echocardiogram did not show any right heart strain. His pain was well controlled.  He continued to have some hemoptysis with approximately dime size bloody sputum. Lower extremity venous duplex was significant for DVT throughout the entire left leg extending through the external iliac and common iliac veins.     Patient is relatively healthy for an 84-year-old.  He spends 2 months a year in a cottage in [facility] with his partner, spends winter in [facility], teaches piano lessons, and worked with [employer] for over 30 years.  His only medications prior to March 2019 were his eyedrops.  Most recently he was initiated on prolonged low-dose steroid taper bridging to Plaquenil for what appears to be seronegative  arthritis by Dr. Oquendo of Rheumatology, though no clear diagnosis exist currently.  Patient continues to have resolution of his hand/joint symptoms and is starting 5 mg daily of his prednisone while continuing Plaquenil.     Last flight was in October, 2019 returning from [place].  Patient describes no lower extremity swelling, no upper extremity swelling at that time.  No calf pain.  No family history of clotting disorders.  No personal history of blood clot. No recent trauma.      Given the extent of clot in his left lower extremity, it was felt he would benefit from a mechanical thrombectomy. He was taken to IR on 12/8/19. However, this surprisingly did not show any left lower extremity thrombus. He did have some left iliac vein stenosis, and this was stented.  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 Repeat duplex confirmed this to be the case.      He is presently doing well since hospital discharge.      He had repeat TTE done recently revealing persistent mild pulmonary htn, and had LE duplex done revealing persistent DVT in his paired femoral veins. D dimer was not done as ordered.      When  seen on 6/24/2020,  he was noted to have DVT which had not resorbed in leg, and was found to have  residual pulmonary htn in lungs. We therefore continued AC, but reduced the dose to 10 mg daily. He is returned six months later on 12/21/2020 to go over results of d dimer , TTE, repeat duplex. His most recent d dimer was normal, down from 9.2. His duplex revealed persistent chronic-appearing occlusive thrombus throughout the left femoral vein, and nonocclusive thrombus in the central portion of the posterior tibial vein. He has mild to moderate MR, mild TR, normal LVEF, mild to moderate pulmonary htn. He was tolerating AC without clinically significant  bruising or bleeding, so we elected to continue Xarleto due to pulmonary htn. Plans are to recheck d dimer, LLE duplex, TTE in one year, RTC at  that itme to discuss need for continued indefnite Adalnallelymartinez AC.        He when last seen on 12/21/2020 reported 15 # unintentional weight loss, and denied having seen his PCP about his thyroid nodule as instructed to do by Dr. Duval on his 12/2019 discharge summary. So, I obtained labs (to includeTSH, T4 free, T3 Free, Thyroid peroxidase antibody, Anti thyroglobulin antibody, Thyroid stimulating immunoglobulin) and imaging( US Thyroid,NM Thyroid Uptake and Scan). He presents for f/u of those today. His FT3 is low, likely due to Hashimoto's thyroiditis as his thyroid Abs are elevated. His uptake scan was unremarkable, and his ultrasound revealed an indeterminate 3.7 x 3.7 x 3.1 cm nodule in the inferior left lobe.            Review Of Systems  Skin: negative  Eyes: negative  Ears/Nose/Throat: negative  Respiratory: No shortness of breath, dyspnea on exertion, cough, or hemoptysis  Cardiovascular: negative  Gastrointestinal: negative  Genitourinary: negative  Musculoskeletal: negative  Neurologic: negative  Psychiatric: negative  Hematologic/Lymphatic/Immunologic: negative  Endocrine: negative    PAST MEDICAL HISTORY:                  Past Medical History:   Diagnosis Date     Cholelithiasis      Glaucoma      Testicular cancer (H)        PAST SURGICAL HISTORY:                  Past Surgical History:   Procedure Laterality Date     HC LAP,ORCHIECTOMY       INCISION LIMBAL RELAXING, KERATOTOMY ARCUATE  5/6/2013    Procedure: INCISION LIMBAL RELAXING, KERATOTOMY ARCUATE;;  Surgeon: Scott Cruz MD;  Location: [facility]     IR LOWER EXTREMITY VENOGRAM LEFT  12/8/2019     jaw orif[       LAPAROSCOPIC CHOLECYSTECTOMY       LASER SELECTIVE TRABECULOPLASTY Left 12/19/2016    Procedure: LASER SELECTIVE TRABECULOPLASTY;  Surgeon: Scott Cruz MD;  Location: [facility]     PHACOEMULSIFICATION CLEAR CORNEA WITH STANDARD INTRAOCULAR LENS IMPLANT  5/6/2013    Procedure: PHACOEMULSIFICATION CLEAR CORNEA WITH STANDARD  INTRAOCULAR LENS IMPLANT;  LEFT PHACOEMULSIFICATION CLEAR CORNEA WITH STANDARD INTRAOCULAR LENS IMPLANT, WITH LIMBAL RELAXING INCISION;  Surgeon: Scott Cruz MD;  Location[facility]       CURRENT MEDICATIONS:                  Current Outpatient Medications   Medication Sig Dispense Refill     hydroxychloroquine (PLAQUENIL) 200 MG tablet Take 150 mg by mouth daily        pantoprazole (PROTONIX) 40 MG EC tablet Take 1 tablet (40 mg) by mouth every morning (before breakfast) 90 tablet 1     rivaroxaban ANTICOAGULANT (XARELTO ANTICOAGULANT) 10 MG TABS tablet Take 1 tablet (10 mg) by mouth daily (with dinner) 90 tablet 3     travoprost SIENA FREE (TRAVATAN Z) 0.004 % ophthalmic solution Place 1 drop into both eyes every evening        amoxicillin-clavulanate (AUGMENTIN) 875-125 MG tablet Take 1 tablet by mouth every 12 hours (Patient not taking: Reported on 1/14/2021) 14 tablet 0     predniSONE (DELTASONE) 5 MG tablet Take 10 mg by mouth daily          ALLERGIES:                No Known Allergies    SOCIAL HISTORY:                  Social History     Socioeconomic History     Marital status: Single     Spouse name: Not on file     Number of children: Not on file     Years of education: Not on file     Highest education level: Not on file   Occupational History     Not on file   Social Needs     Financial resource strain: Not on file     Food insecurity     Worry: Not on file     Inability: Not on file     Transportation needs     Medical: Not on file     Non-medical: Not on file   Tobacco Use     Smoking status: Never Smoker     Smokeless tobacco: Never Used   Substance and Sexual Activity     Alcohol use: Yes     Comment: OCC     Drug use: No     Sexual activity: Never   Lifestyle     Physical activity     Days per week: Not on file     Minutes per session: Not on file     Stress: Not on file   Relationships     Social connections     Talks on phone: Not on file     Gets together: Not on file     Attends [employer]  service: Not on file     Active member of club or organization: Not on file     Attends meetings of clubs or organizations: Not on file     Relationship status: Not on file     Intimate partner violence     Fear of current or ex partner: Not on file     Emotionally abused: Not on file     Physically abused: Not on file     Forced sexual activity: Not on file   Other Topics Concern     Not on file   Social History Narrative     Not on file       FAMILY HISTORY:                 No family history on file.      Physical exam was not performed as it was a COVID mandated video visit.    Component      Latest Ref Rng & Units 1/4/2021   Thyroid Stim Immunog      <=1.3 TSI index 1.2   TSH      0.40 - 4.00 mU/L 1.06   T4 Free      0.76 - 1.46 ng/dL 0.87   Free T3      2.3 - 4.2 pg/mL 2.1 (L)   Thyroid Peroxidase Antibody      <35 IU/mL 145 (H)   Thyroglobulin Antibody      <40 IU/mL 168 (H)       US THYROID 12/28/2020 1:16 PM     CLINICAL HISTORY: Unintentional weight loss. Thyroid nodule.  TECHNIQUE: Thyroid ultrasound.      COMPARISON: None.      FINDINGS:  RIGHT lobe: 5.9 x 1.2 x 2.5 cm. Heterogenous echotexture.  Isthmus: 5 mm.  LEFT lobe: 6.5 x 1.7 x 3.1 cm. Heterogenous echotexture.     NODULES:     Nodule 1: 3.7 x 3.7 x 3.1 cm nodule in the inferior left lobe   Composition: Solid or almost completely solid, 2 points   Echogenicity: Hyperechoic or isoechoic, 1 point   Shape: Not taller than wide, 0 points   Margin: Smooth, 0 points   Echogenic Foci: Punctate echoic foci, 3 points   Point Total: 4-6 points. TI-RADS 4. If 1.5 cm or larger, recommend  FNA; if 1 cm or larger, follow up US (annually for 5 years).                                                                          IMPRESSION: One indeterminant thyroid nodule in the inferior left  lobe.     Nodules are characterized per  ACR Thyroid Imaging, Reporting and Data System (TI-RADS): White Paper  of the ACR TI-RADS Committee  Ed Goldsmith et al. Journal  of the American College of  Radiology 2017. Volume 14 (2017), Issue 5, 587-100.      PHI SCOTT MD     NUCLEAR MEDICINE THYROID UPTAKE/SCAN  December 29, 2020 2:08 PM     HISTORY: Prior deep vein thrombosis/pulmonary embolism. Incidentally  discovered thyroid nodule, evaluate for hot versus cold thyroid  nodule. Unintentional weight loss. Thyroid nodule.     DOSE: 247 uCi I-123 po on 12-      FINDINGS:    Thyroid 24-hour total uptake: 22% (normal = 10 to 30%).  Differential uptake on right: 10%  Differential uptake on left: 12%.     Thyroid gland computer estimated total weight: 95.3 gm.  Right gland estimate: 54.5 gm.  Left gland estimate: 40.8 gm.     The thyroid scan appears within normal limits with no apparent hot or  cold nodules.                                                                       IMPRESSION: Unremarkable thyroid uptake and scan.     PHI SCOTT MD    A/P:    (E06.3) Hashimoto's thyroiditis  (primary encounter diagnosis)  Comment: All labs and imaging are viewed with the patient. He is informed he has Hashimoto's thyroiditis, and that he should be started on thyroid replacement hormonal therapy.  Plan: levothyroxine (SYNTHROID/LEVOTHROID) 50 MCG         tablet        He is advised to f/u with his PCP regarding this matter moving forward.        (I82.422) First lifetime thromboembolic event of left lower extremity DVT and bilateral PE s/p venogram with left common iliac vein stenting 12/8/19  Comment: His most recent d dimer is normal, down from 9.2. His duplex reveals persistent chronic-appearing occlusive thrombus throughout the left femoral vein, and nonocclusive thrombus in the central portion of the posterior tibial vein. He has mild to moderate MR, mild TR, normal LVEF, mild to moderate pulmonary htn. He is tolerating AC without clinically significant  bruising or bleeding. Continue Xarleto due to pulmonary htn. Recheck d dimer, LLE duplex, TTE in one year, RTC at that itme  to disucss need for continued indefnite Xarleto AC.  Plan: US Lower Extremity Venous Duplex Left, D dimer         quantitative            (I27.20) Pulmonary hypertension (H)  Comment: as above  Plan: Echocardiogram Complete                    Video start: 12:58  Video end: 13:28

## 2021-01-15 NOTE — PROGRESS NOTES
AVS and office visit note printed, sent to address on file per Dr. Santos's instructions.  Gypsy Womack RN BSN  [facility]  [phone]

## 2021-12-13 ENCOUNTER — TELEPHONE (OUTPATIENT)
Dept: OTHER | Facility: CLINIC | Age: 86
End: 2021-12-13
Payer: COMMERCIAL

## 2021-12-13 NOTE — TELEPHONE ENCOUNTER
Provider: Dr. Santos    Reason for call:    Pt wondering if he also needs to have any thyroid testing completed. Pt states he has not had any retesting done for this.    He will be out of town January and February and is trying to get everything in before he is out of town.         Rosario Tomlinson    [facility]   [phone]

## 2021-12-13 NOTE — TELEPHONE ENCOUNTER
Per LOV 1/14/21:  (E06.3) Hashimoto's thyroiditis  (primary encounter diagnosis)  Comment: All labs and imaging are viewed with the patient. He is informed he has Hashimoto's thyroiditis, and that he should be started on thyroid replacement hormonal therapy.  Plan: levothyroxine (SYNTHROID/LEVOTHROID) 50 MCG         tablet        He is advised to f/u with his PCP regarding this matter moving forward.    Called Alec to let him know he needs to contact his primary care provider to discuss this.    Gypys Womack RN BSN  [facility]  [phone]

## 2021-12-15 DIAGNOSIS — I26.99 OTHER ACUTE PULMONARY EMBOLISM WITHOUT ACUTE COR PULMONALE (H): ICD-10-CM

## 2021-12-15 DIAGNOSIS — I82.422 ACUTE DEEP VEIN THROMBOSIS (DVT) OF ILIAC VEIN OF LEFT LOWER EXTREMITY (H): ICD-10-CM

## 2021-12-15 RX ORDER — RIVAROXABAN 10 MG/1
TABLET, FILM COATED ORAL
Qty: 30 TABLET | Refills: 3 | Status: SHIPPED | OUTPATIENT
Start: 2021-12-15 | End: 2021-12-29

## 2021-12-15 NOTE — TELEPHONE ENCOUNTER
Unable to refill per [facility] protocol.  Med and pharm loaded.    April RODARTEN, RN    [facility]  [facility]ascular [facility]  Office: [phone]  Fax: [phone]

## 2021-12-20 ENCOUNTER — HOSPITAL ENCOUNTER (OUTPATIENT)
Dept: CARDIOLOGY | Facility: CLINIC | Age: 86
Discharge: HOME | End: 2021-12-20
Attending: INTERNAL MEDICINE | Admitting: INTERNAL MEDICINE
Payer: COMMERCIAL

## 2021-12-20 DIAGNOSIS — I27.20 PULMONARY HYPERTENSION (H): ICD-10-CM

## 2021-12-20 LAB — LVEF ECHO: NORMAL

## 2021-12-20 PROCEDURE — 93306 TTE W/DOPPLER COMPLETE: CPT | Mod: 26 | Performed by: INTERNAL MEDICINE

## 2021-12-20 PROCEDURE — 93306 TTE W/DOPPLER COMPLETE: CPT

## 2021-12-22 ENCOUNTER — LAB (OUTPATIENT)
Dept: LAB | Facility: CLINIC | Age: 86
End: 2021-12-22
Payer: COMMERCIAL

## 2021-12-22 DIAGNOSIS — I82.422 ACUTE DEEP VEIN THROMBOSIS (DVT) OF ILIAC VEIN OF LEFT LOWER EXTREMITY (H): ICD-10-CM

## 2021-12-22 LAB — D DIMER PPP FEU-MCNC: 0.29 UG/ML FEU (ref 0–0.5)

## 2021-12-22 PROCEDURE — 85379 FIBRIN DEGRADATION QUANT: CPT

## 2021-12-22 PROCEDURE — 36415 COLL VENOUS BLD VENIPUNCTURE: CPT

## 2021-12-23 NOTE — TELEPHONE ENCOUNTER
Addressed in a separate encounter.  Gypsy Womack RN BSN  [facility]  [phone]       - - -

## 2021-12-27 ENCOUNTER — ANCILLARY PROCEDURE (OUTPATIENT)
Dept: VASCULAR ULTRASOUND | Facility: CLINIC | Age: 86
End: 2021-12-27
Attending: INTERNAL MEDICINE
Payer: COMMERCIAL

## 2021-12-27 DIAGNOSIS — I82.422 ACUTE DEEP VEIN THROMBOSIS (DVT) OF ILIAC VEIN OF LEFT LOWER EXTREMITY (H): ICD-10-CM

## 2021-12-27 PROCEDURE — 93971 EXTREMITY STUDY: CPT | Mod: LT | Performed by: INTERNAL MEDICINE

## 2021-12-29 ENCOUNTER — OFFICE VISIT (OUTPATIENT)
Dept: OTHER | Facility: CLINIC | Age: 86
End: 2021-12-29
Attending: INTERNAL MEDICINE
Payer: COMMERCIAL

## 2021-12-29 VITALS
OXYGEN SATURATION: 98 % | HEART RATE: 102 BPM | DIASTOLIC BLOOD PRESSURE: 76 MMHG | BODY MASS INDEX: 22.5 KG/M2 | RESPIRATION RATE: 18 BRPM | SYSTOLIC BLOOD PRESSURE: 120 MMHG | WEIGHT: 140 LBS | HEIGHT: 66 IN

## 2021-12-29 DIAGNOSIS — I87.1 COMPRESSION OF VEIN: ICD-10-CM

## 2021-12-29 DIAGNOSIS — I82.422 ACUTE DEEP VEIN THROMBOSIS (DVT) OF ILIAC VEIN OF LEFT LOWER EXTREMITY (H): ICD-10-CM

## 2021-12-29 DIAGNOSIS — E06.3 HASHIMOTO'S THYROIDITIS: ICD-10-CM

## 2021-12-29 DIAGNOSIS — I27.20 PULMONARY HYPERTENSION (H): ICD-10-CM

## 2021-12-29 DIAGNOSIS — I26.99 OTHER ACUTE PULMONARY EMBOLISM WITHOUT ACUTE COR PULMONALE (H): ICD-10-CM

## 2021-12-29 PROCEDURE — G0463 HOSPITAL OUTPT CLINIC VISIT: HCPCS

## 2021-12-29 PROCEDURE — 99214 OFFICE O/P EST MOD 30 MIN: CPT | Performed by: INTERNAL MEDICINE

## 2021-12-29 ASSESSMENT — MIFFLIN-ST. JEOR: SCORE: 1262.79

## 2021-12-29 NOTE — PROGRESS NOTES
"[facility]        Patient is here for a follow up  to discuss about echo results    /76 (BP Location: Left arm, Patient Position: Chair, Cuff Size: Adult Regular)   Pulse 102   Resp 18   Ht 5' 6\" (1.676 m)   Wt 140 lb (63.5 kg)   SpO2 98%   BMI 22.60 kg/m      The provider has been notified that the patient has no concerns.     Questions patient would like addressed today are: N/A.    Refills are needed: No    Has homecare services and agency name:  [facility]      "

## 2021-12-29 NOTE — PROGRESS NOTES
Alec Boston Jr. is a 86 year old male who is presenting at the current time to discuss his diagnosi(es) of        Hashimoto's thyroiditis  Acute deep vein thrombosis (DVT) of iliac vein of left lower extremity (H)  Pulmonary hypertension (H)  Compression of vein  Other acute pulmonary embolism without acute cor pulmonale (H)  .        HPI:     Alec Boston Jr. is an 84 year old male looking much younger than his states age who presented to the emergency department on 12/6/19 with 3 days of streaks of blood with coughing episodes and acutely developing left flank pain and more notable hemoptysis when leaving a concert at [employer]. He went straight to the ER and was found to have bilateral pulmonary emboli with a heart rate initially in the 120s range, tachypneic to 18.  CT imaging demonstrated also early left pulmonary infarct, likely explaining patient's left flank pain.  Note that initial work-up was initially concerning for renal stone given patient's location and description of discomfort, though this led to an abdomen/pelvis CT with possible basilar infiltrates.  A d-dimer was sent which was significantly elevated in the 9 range, and subsequent CT PE study resulted with pulmonary infarct and bilateral pulmonary embolism.     He was hemodynamically stable. Echocardiogram did not show any right heart strain. His pain was well controlled.  He continued to have some hemoptysis with approximately dime size bloody sputum. Lower extremity venous duplex was significant for DVT throughout the entire left leg extending through the external iliac and common iliac veins.     Patient is healthy for an 84-year-old.  He spends 2 months a year in a cottage in [facility] with his partner, spends winter in [facility], teaches piano lessons, and worked with [employer] for over 30 years.  His only medications prior to March 2019 were his eyedrops.  Most recently he was initiated on prolonged low-dose  steroid taper bridging to Plaquenil for what appears to be seronegative rheumatoid arthritis by Dr. Oquendo of Rheumatology, though no clear diagnosis exist currently.  Patient continues to have resolution of his hand/joint symptoms and was started on 5 mg daily of his prednisone while continuing Plaquenil when last seen. He is now off of the prednisone.     Last flight was in October, 2019 returning from [place].  Patient describes no lower extremity swelling, no upper extremity swelling at that time.  No calf pain.  No family history of clotting disorders.  No personal history of blood clot. No recent trauma.      Given the extent of clot in his left lower extremity, it was felt he would benefit from a mechanical thrombectomy. He was taken to IR on 12/8/19. However, this surprisingly did not show any left lower extremity thrombus. He did have some left iliac vein stenosis, and this was stented.  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 Repeat duplex confirmed this to be the case.      He is presently doing well.      He had repeat TTE done 12/20/2021 revealing persistent mild pulmonary htn, and had LE duplex done revealing persistent DVT in his paired femoral veins. D dimer was not done as ordered.      When  seen on 6/24/2020,  he was noted to have DVT which had not resorbed in leg, and was found to have  residual pulmonary htn in lungs. We therefore continued AC, but reduced the dose to 10 mg daily. He returned six months later on 12/21/2020 to go over results of d dimer , TTE, repeat duplex. That d dimer was normal, down from 9.2. His duplex revealed persistent chronic-appearing occlusive thrombus throughout the left femoral vein, and nonocclusive thrombus in the central portion of the posterior tibial vein. He has mild to moderate MR, mild TR, normal LVEF, mild to moderate pulmonary htn. He was tolerating AC without clinically significant  bruising or bleeding, so we elected to continue  Xarleto due to pulmonary htn. Plans are to recheck d dimer, LLE duplex, TTE in one year, RTC at that itme to discuss need for continued indefnite Xarleto AC.        When seen on 12/21/2020 he reported 15 # unintentional weight loss, and denied having seen his PCP about his thyroid nodule as instructed to do by Dr. Duval on his 12/2019 discharge summary. So, I obtained labs (to includeTSH, T4 free, T3 Free, Thyroid peroxidase antibody, Anti thyroglobulin antibody, Thyroid stimulating immunoglobulin) and imaging( US Thyroid,NM Thyroid Uptake and Scan). He presented for f/u of those 1/24/2021. His FT3 was low, likely due to Hashimoto's thyroiditis and his thyroid Abs were elevated. His uptake scan was unremarkable, and his ultrasound revealed an indeterminate 3.7 x 3.7 x 3.1 cm nodule in the inferior left lobe.            Review Of Systems  Skin: negative  Eyes: negative  Ears/Nose/Throat: negative  Respiratory: No shortness of breath, dyspnea on exertion, cough, or hemoptysis  Cardiovascular: negative  Gastrointestinal: negative  Genitourinary: negative  Musculoskeletal: negative  Neurologic: negative  Psychiatric: negative  Hematologic/Lymphatic/Immunologic: negative  Endocrine: negative      PAST MEDICAL HISTORY:                  Past Medical History:   Diagnosis Date     Cholelithiasis      Glaucoma      Testicular cancer (H)        PAST SURGICAL HISTORY:                  Past Surgical History:   Procedure Laterality Date     HC LAP,ORCHIECTOMY       INCISION LIMBAL RELAXING, KERATOTOMY ARCUATE  5/6/2013    Procedure: INCISION LIMBAL RELAXING, KERATOTOMY ARCUATE;;  Surgeon: Scott Cruz MD;  Location: [facility]     IR LOWER EXTREMITY VENOGRAM LEFT  12/8/2019     jaw orif[       LAPAROSCOPIC CHOLECYSTECTOMY       LASER SELECTIVE TRABECULOPLASTY Left 12/19/2016    Procedure: LASER SELECTIVE TRABECULOPLASTY;  Surgeon: Scott Cruz MD;  Location: [facility]     PHACOEMULSIFICATION CLEAR CORNEA WITH STANDARD  INTRAOCULAR LENS IMPLANT  5/6/2013    Procedure: PHACOEMULSIFICATION CLEAR CORNEA WITH STANDARD INTRAOCULAR LENS IMPLANT;  LEFT PHACOEMULSIFICATION CLEAR CORNEA WITH STANDARD INTRAOCULAR LENS IMPLANT, WITH LIMBAL RELAXING INCISION;  Surgeon: Nancy, Scott CLEARY MD;  [facility]       CURRENT MEDICATIONS:                  Current Outpatient Medications   Medication Sig Dispense Refill     hydroxychloroquine (PLAQUENIL) 200 MG tablet Take 150 mg by mouth daily        levothyroxine (SYNTHROID/LEVOTHROID) 50 MCG tablet Take 1 tablet (50 mcg) by mouth daily 90 tablet 3     travoprost SIENA FREE (TRAVATAN Z) 0.004 % ophthalmic solution Place 1 drop into both eyes every evening        XARELTO ANTICOAGULANT 10 MG TABS tablet TAKE 1 TABLET(10 MG) BY MOUTH DAILY WITH DINNER 30 tablet 3     amoxicillin-clavulanate (AUGMENTIN) 875-125 MG tablet Take 1 tablet by mouth every 12 hours (Patient not taking: Reported on 12/29/2021) 14 tablet 0     pantoprazole (PROTONIX) 40 MG EC tablet Take 1 tablet (40 mg) by mouth every morning (before breakfast) (Patient not taking: Reported on 12/29/2021) 90 tablet 1     predniSONE (DELTASONE) 5 MG tablet Take 10 mg by mouth daily  (Patient not taking: Reported on 12/29/2021)         ALLERGIES:                No Known Allergies    SOCIAL HISTORY:                  Social History     Socioeconomic History     Marital status: Single     Spouse name: Not on file     Number of children: Not on file     Years of education: Not on file     Highest education level: Not on file   Occupational History     Not on file   Tobacco Use     Smoking status: Never Smoker     Smokeless tobacco: Never Used   Substance and Sexual Activity     Alcohol use: Yes     Comment: OCC     Drug use: No     Sexual activity: Never   Other Topics Concern     Not on file   Social History Narrative     Not on file     Social Determinants of Health     Financial Resource Strain: Not on file   Food Insecurity: Not on file    Transportation Needs: Not on file   Physical Activity: Not on file   Stress: Not on file   Social Connections: Not on file   Intimate Partner Violence: Not on file   Housing Stability: Not on file       FAMILY HISTORY:                 No family history on file.      Physical exam Reveals:    O/P: WNL  HEENT: WNL  NECK: No JVD, thyromegaly, or lymphadenopathy  HEART: RRR, no murmurs, gallops, or rubs  LUNGS: CTA bilaterally without rales, wheezes, or rhonchi  GI: NABS, nondistended, nontender, soft  EXT:without cyanosis, clubbing, or edema  NEURO: nonfocal  : no flank tenderness      Component      Latest Ref Rng & Units 12/7/2019 12/17/2020 1/4/2021 12/22/2021   D-Dimer      0.0 - 0.50 ug/ml FEU 9.2 (H) 0.3     Thyroid Stim Immunog      <=1.3 TSI index   1.2    TSH      0.40 - 4.00 mU/L   1.06    T4 Free      0.76 - 1.46 ng/dL   0.87    Free T3      2.3 - 4.2 pg/mL   2.1 (L)    Thyroid Peroxidase Antibody      <35 IU/mL   145 (H)    Thyroglobulin Antibody      <40 IU/mL   168 (H)    D-Dimer Quantitative      0.00 - 0.50 ug/mL FEU    0.29     INDICATION: History of left lower extremity DVT                                                                      IMPRESSION:  1. Negative for acute left lower extremity deep vein thrombosis.   2. 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 Compared to the prior study no significant change     EXAM DESCRIPTION AND FINDINGS:  A noninvasive lower extremity and limited iliac venous ultrasound exam  was performed using duplex imaging with spectral and color Doppler.  The previously implanted stent in the left common iliac vein appears  patent. The left common femoral vein is widely patent. There is a  chronically appearing occlusive thrombus in the left femoral vein and  chronic occlusive thrombus in the posterior tibial vein. Popliteal  vein is patent. There appears to be a collateral vein from the  popliteal to the common femoral vein.  Overall, no significant change  when compared to the last study.     STUDY QUALITY: Excellent        [facility]  Dedicated to Excellence in Vascular Care     To schedule a Vascular Lab study or consultation  Call [phone]. OPTION 2.              JAYESH AMOR MD           [id number]  [id number]  [id number]  [id number]^[id number]^PAYAL^BIPIN     [facility]  [facility]  Echocardiography [facility]  [address]  [address]  [place] [address]  Phone [phone]  Fax [phone]     Name: JUSTIN BELLAJR.  MRN: [id number]  : 1935  Study Date: 2021 12:45 PM  Age: 86 yrs  Gender: Male  Patient Location: [facility]  Reason For Study: Pulmonary hypertension (H)  Ordering Physician: PAYAL GRIFFIN  Referring Physician: PAYAL GRIFIFN  Performed By: KO Peacock     BSA: 1.7 m2  Height: 67 in  Weight: 141 lb  HR: 78  BP: 137/79 mmHg  ______________________________________________________________________________  Procedure  Complete Echo Adult.     ______________________________________________________________________________  Interpretation Summary     There is moderate concentric left ventricular hypertrophy.  The visual ejection fraction is 60-65%.  The right ventricle is normal in structure, function and size.  The left atrium is mild to moderately dilated.  There is mild to moderate (1-2+) mitral regurgitation.  There is mild (1+) aortic regurgitation.  Mild aortic root dilatation.  Compared to previous study findings are similar  ______________________________________________________________________________  Left Ventricle  The left ventricle is normal in size. There is moderate concentric left  ventricular hypertrophy. Left ventricular systolic function is normal. The  visual ejection fraction is 60-65%. Grade II or moderate diastolic  dysfunction. No regional wall motion abnormalities  noted.     Right Ventricle  The right ventricle is normal in structure, function and size.     Atria  The left atrium is mild to moderately dilated. Right atrial size is normal.  There is no color Doppler evidence of an atrial shunt.     Mitral Valve  The mitral valve leaflets appear thickened, but open well. Mild mitral valve  prolapse, bileaflet. There is mild to moderate (1-2+) mitral regurgitation.  The mitral regurgitant jet is eccentrically directed.     Tricuspid Valve  The tricuspid valve is normal in structure and function. There is mild (1+)  tricuspid regurgitation. The right ventricular systolic pressure is  approximated at 29.3 mmHg plus the right atrial pressure. Right ventricular  systolic pressure is elevated, consistent with mild pulmonary hypertension.     Aortic Valve  There is mild trileaflet aortic sclerosis. There is mild (1+) aortic  regurgitation.     Pulmonic Valve  The pulmonic valve is not well visualized. There is no pulmonic valvular  regurgitation.     Vessels  Mild aortic root dilatation. The inferior vena cava is normal.     Pericardium  There is no pericardial effusion.     Rhythm  Sinus rhythm was noted.     ______________________________________________________________________________  MMode/2D Measurements & Calculations  IVSd: 1.4 cm  LVIDd: 4.4 cm  LVIDs: 2.0 cm  LVPWd: 1.5 cm  FS: 54.4 %  LV mass(C)d: 255.1 grams  LV mass(C)dI: 146.3 grams/m2  Ao root diam: 4.0 cm     asc Aorta Diam: 3.3 cm  LVOT diam: 2.0 cm  LVOT area: 3.3 cm2  LA Volume Indexed (AL/bp): 36.3 ml/m2  RWT: 0.67     Doppler Measurements & Calculations  MV E max prabhu: 75.1 cm/sec  MV A max prabhu: 88.4 cm/sec  MV E/A: 0.85  MV dec time: 0.16 sec  AI P1/2t: 385.0 msec  PA acc time: 0.15 sec     TR max prabhu: 270.5 cm/sec  TR max P.3 mmHg  Pulm Sys Prabhu: 62.8 cm/sec  Pulm Benedict Prabhu: 36.6 cm/sec  Pulm A Revs Prabhu: 35.2 cm/sec  Pulm S/D: 1.7  E/E': 14.9  Peak E' Prabhu: 5.0 cm/sec      ______________________________________________________________________________  Report approved by: Sneha Shetty 12/20/2021 02:20 PM            US THYROID 12/28/2020 1:16 PM     CLINICAL HISTORY: Unintentional weight loss. Thyroid nodule.  TECHNIQUE: Thyroid ultrasound.      COMPARISON: None.      FINDINGS:  RIGHT lobe: 5.9 x 1.2 x 2.5 cm. Heterogenous echotexture.  Isthmus: 5 mm.  LEFT lobe: 6.5 x 1.7 x 3.1 cm. Heterogenous echotexture.     NODULES:     Nodule 1: 3.7 x 3.7 x 3.1 cm nodule in the inferior left lobe   Composition: Solid or almost completely solid, 2 points   Echogenicity: Hyperechoic or isoechoic, 1 point   Shape: Not taller than wide, 0 points   Margin: Smooth, 0 points   Echogenic Foci: Punctate echoic foci, 3 points   Point Total: 4-6 points. TI-RADS 4. If 1.5 cm or larger, recommend  FNA; if 1 cm or larger, follow up US (annually for 5 years).                                                                          IMPRESSION: One indeterminant thyroid nodule in the inferior left  lobe.     Nodules are characterized per  ACR Thyroid Imaging, Reporting and Data System (TI-RADS): White Paper  of the ACR TI-RADS Committee  Ed Goldsmith. et al. Journal of the American College of  Radiology 2017. Volume 14 (2017), Issue 5, 327-150.      PHI SCOTT MD      NUCLEAR MEDICINE THYROID UPTAKE/SCAN  December 29, 2020 2:08 PM     HISTORY: Prior deep vein thrombosis/pulmonary embolism. Incidentally  discovered thyroid nodule, evaluate for hot versus cold thyroid  nodule. Unintentional weight loss. Thyroid nodule.     DOSE: 247 uCi I-123 po on 12-      FINDINGS:    Thyroid 24-hour total uptake: 22% (normal = 10 to 30%).  Differential uptake on right: 10%  Differential uptake on left: 12%.     Thyroid gland computer estimated total weight: 95.3 gm.  Right gland estimate: 54.5 gm.  Left gland estimate: 40.8 gm.     The thyroid scan appears within normal limits with no apparent hot  or  cold nodules.                                                                       IMPRESSION: Unremarkable thyroid uptake and scan.     PHI SCOTT MD     A/P:     (E06.3) Hashimoto's thyroiditis  (primary encounter diagnosis)  Comment: All labs and imaging are viewed with the patient. He is informed he has Hashimoto's thyroiditis, and that he should be started on thyroid replacement hormonal therapy.  Plan: levothyroxine (SYNTHROID/LEVOTHROID) 50 MCG         tablet        He was advised to f/u with his PCP regarding this matter. He had a normal TSH on 4/20/2021.           (I82.422) First lifetime thromboembolic event of left lower extremity DVT and bilateral PE s/p venogram with left common iliac vein stenting 12/8/19  Comment: His most recent d dimer is normal, down from 9.2. His duplex revealed persistent chronic-appearing occlusive thrombus throughout the left femoral vein, and nonocclusive thrombus in the central portion of the posterior tibial vein. He has mild to moderate MR, mild TR, normal LVEF, mild to moderate pulmonary htn. He was tolerating AC without clinically significant  bruising or bleeding, so we elected to continue Xarleto due to pulmonary htn. Recent recheck d dimer, LLE duplex, TTE in 12/2021 revealed a normal d dimer, unchanged chronic DVT, and continued mild pulmonary htn. RTC at this itme to disucss need for continued indefnite Xarleto AC.  Plan: US Lower Extremity Venous Duplex Left, D dimer         quantitative             (I27.20) Pulmonary hypertension (H)  Comment: as above  Plan: Echocardiogram Complete

## 2021-12-31 DIAGNOSIS — E06.3 HASHIMOTO'S THYROIDITIS: ICD-10-CM

## 2021-12-31 RX ORDER — LEVOTHYROXINE SODIUM 50 UG/1
TABLET ORAL
Qty: 90 TABLET | Refills: 3 | Status: SHIPPED | OUTPATIENT
Start: 2021-12-31

## 2021-12-31 NOTE — TELEPHONE ENCOUNTER
"Last Written Prescription Date:  1/14/21  Last Fill Quantity: 90,  # refills: 3   Last office visit: 12/29/2021 with prescribing provider:     Future Office Visit:      Requested Prescriptions   Pending Prescriptions Disp Refills     levothyroxine (SYNTHROID/LEVOTHROID) 50 MCG tablet [Pharmacy Med Name: LEVOTHYROXINE 0.05MG (50MCG) TAB] 90 tablet 3     Sig: TAKE 1 TABLET(50 MCG) BY MOUTH DAILY       Thyroid Protocol Passed - 12/31/2021  2:03 PM        Passed - Patient is 12 years or older        Passed - Recent (12 mo) or future (30 days) visit within the authorizing provider's specialty     Patient has had an office visit with the authorizing provider or a provider within the authorizing providers department within the previous 12 mos or has a future within next 30 days. See \"Patient Info\" tab in inbasket, or \"Choose Columns\" in Meds & Orders section of the refill encounter.              Passed - Medication is active on med list        Passed - Normal TSH on file in past 12 months     Recent Labs   Lab Test 01/04/21  1425   TSH 1.06                 Prescription approved per [employer] Refill Protocol.    April MALDONADO, RN    [facility]  Office: [phone]  Fax: [phone]        "

## 2022-12-02 ENCOUNTER — LAB (OUTPATIENT)
Dept: LAB | Facility: CLINIC | Age: 87
End: 2022-12-02
Payer: COMMERCIAL

## 2022-12-02 ENCOUNTER — HOSPITAL ENCOUNTER (OUTPATIENT)
Dept: CARDIOLOGY | Facility: CLINIC | Age: 87
Discharge: HOME | End: 2022-12-02
Attending: INTERNAL MEDICINE | Admitting: INTERNAL MEDICINE
Payer: COMMERCIAL

## 2022-12-02 DIAGNOSIS — I82.422 ACUTE DEEP VEIN THROMBOSIS (DVT) OF ILIAC VEIN OF LEFT LOWER EXTREMITY (H): ICD-10-CM

## 2022-12-02 DIAGNOSIS — I27.20 PULMONARY HYPERTENSION (H): ICD-10-CM

## 2022-12-02 LAB
D DIMER PPP FEU-MCNC: 0.44 UG/ML FEU (ref 0–0.5)
LVEF ECHO: NORMAL

## 2022-12-02 PROCEDURE — 85379 FIBRIN DEGRADATION QUANT: CPT

## 2022-12-02 PROCEDURE — 93306 TTE W/DOPPLER COMPLETE: CPT

## 2022-12-02 PROCEDURE — 36415 COLL VENOUS BLD VENIPUNCTURE: CPT

## 2022-12-02 PROCEDURE — 93306 TTE W/DOPPLER COMPLETE: CPT | Mod: 26 | Performed by: INTERNAL MEDICINE

## 2022-12-05 ENCOUNTER — HOSPITAL ENCOUNTER (OUTPATIENT)
Dept: ULTRASOUND IMAGING | Facility: CLINIC | Age: 87
Discharge: HOME | End: 2022-12-05
Attending: INTERNAL MEDICINE
Payer: COMMERCIAL

## 2022-12-05 DIAGNOSIS — I82.422 ACUTE DEEP VEIN THROMBOSIS (DVT) OF ILIAC VEIN OF LEFT LOWER EXTREMITY (H): ICD-10-CM

## 2022-12-05 DIAGNOSIS — I87.1 COMPRESSION OF VEIN: ICD-10-CM

## 2022-12-05 PROCEDURE — 93971 EXTREMITY STUDY: CPT | Mod: LT

## 2022-12-05 PROCEDURE — 93978 VASCULAR STUDY: CPT

## 2022-12-08 ENCOUNTER — OFFICE VISIT (OUTPATIENT)
Dept: OTHER | Facility: CLINIC | Age: 87
End: 2022-12-08
Attending: INTERNAL MEDICINE
Payer: COMMERCIAL

## 2022-12-08 VITALS
SYSTOLIC BLOOD PRESSURE: 149 MMHG | WEIGHT: 140 LBS | HEIGHT: 66 IN | HEART RATE: 76 BPM | BODY MASS INDEX: 22.5 KG/M2 | DIASTOLIC BLOOD PRESSURE: 85 MMHG | OXYGEN SATURATION: 98 %

## 2022-12-08 DIAGNOSIS — I87.1 COMPRESSION OF VEIN: ICD-10-CM

## 2022-12-08 DIAGNOSIS — I26.99 OTHER ACUTE PULMONARY EMBOLISM WITHOUT ACUTE COR PULMONALE (H): ICD-10-CM

## 2022-12-08 DIAGNOSIS — E06.3 HASHIMOTO'S THYROIDITIS: ICD-10-CM

## 2022-12-08 DIAGNOSIS — I27.20 PULMONARY HYPERTENSION (H): ICD-10-CM

## 2022-12-08 DIAGNOSIS — I82.422 ACUTE DEEP VEIN THROMBOSIS (DVT) OF ILIAC VEIN OF LEFT LOWER EXTREMITY (H): ICD-10-CM

## 2022-12-08 PROCEDURE — G0463 HOSPITAL OUTPT CLINIC VISIT: HCPCS

## 2022-12-08 PROCEDURE — 99214 OFFICE O/P EST MOD 30 MIN: CPT | Performed by: INTERNAL MEDICINE

## 2022-12-08 RX ORDER — GABAPENTIN 300 MG/1
300 CAPSULE ORAL DAILY
COMMUNITY
Start: 2022-10-04

## 2022-12-08 NOTE — PROGRESS NOTES
Alec Boston Jr. is a 87 year old male who is presenting at the current time to discuss his diagnosi(es) of         Hashimoto's thyroiditis  Acute deep vein thrombosis (DVT) of iliac vein of left lower extremity (H)  Pulmonary hypertension (H)  Compression of vein  Other acute pulmonary embolism without acute cor pulmonale (H)  .           HPI:     Alec Boston Jr. is an 87 year old male looking much younger than his states age who presented to the emergency department on 12/6/19 with 3 days of streaks of blood with coughing episodes and acutely developing left flank pain and more notable hemoptysis when leaving a concert at [place]. He went straight to the ER and was found to have bilateral pulmonary emboli with a heart rate initially in the 120s range, tachypneic to 18.  CT imaging demonstrated also early left pulmonary infarct, likely explaining patient's left flank pain.  Note that initial work-up was initially concerning for renal stone given patient's location and description of discomfort, though this led to an abdomen/pelvis CT with possible basilar infiltrates.  A d-dimer was sent which was significantly elevated in the 9 range, and subsequent CT PE study resulted with pulmonary infarct and bilateral pulmonary embolism.     He was hemodynamically stable. Echocardiogram did not show any right heart strain. His pain was well controlled.  He continued to have some hemoptysis with approximately dime size bloody sputum. Lower extremity venous duplex was significant for DVT throughout the entire left leg extending through the external iliac and common iliac veins.     Patient is healthy for an 87-year-old.  He spends 2 months a year in a cottage in [facility] [place] with his partner, spends winter in [facility], teaches piano lessons, and worked with [employer] for over 30 years.  His only medications prior to March 2019 were his eyedrops.  Most recently he was initiated on prolonged  low-dose steroid taper bridging to Plaquenil for what appears to be seronegative rheumatoid arthritis by Dr. Oquendo of Rheumatology, though no clear diagnosis exist currently.  Patient continues to have resolution of his hand/joint symptoms and was started on 5 mg daily of his prednisone while continuing Plaquenil when last seen. He is now off of the prednisone.     Last flight was in October, 2019 returning from [place].  Patient describes no lower extremity swelling, no upper extremity swelling at that time.  No calf pain.  No family history of clotting disorders.  No personal history of blood clot. No recent trauma.      Given the extent of clot in his left lower extremity, it was felt he would benefit from a mechanical thrombectomy. He was taken to IR on 12/8/19. However, this surprisingly did not show any left lower extremity thrombus. He did have some left iliac vein stenosis, and this was stented.  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 Repeat duplex confirmed this to be the case.      He is presently doing well.      He had repeat TTE done 12/20/2021 revealing persistent mild pulmonary htn, and had LE duplex done revealing persistent DVT in his paired femoral veins. D dimer was not done as ordered.      When  seen on 6/24/2020,  he was noted to have DVT which had not resorbed in leg, and was found to have  residual pulmonary htn in lungs. We therefore continued AC, but reduced the dose to 10 mg daily. He returned six months later on 12/21/2020 to go over results of d dimer , TTE, repeat duplex. That d dimer was normal, down from 9.2. His duplex revealed persistent chronic-appearing occlusive thrombus throughout the left femoral vein, and nonocclusive thrombus in the central portion of the posterior tibial vein. He has mild to moderate MR, mild TR, normal LVEF, mild to moderate pulmonary htn. He was tolerating AC without clinically significant  bruising or bleeding, so we elected to  continue Xarleto due to pulmonary htn. Plans are to recheck d dimer, LLE duplex, TTE in one year, RTC at that itme to discuss need for continued indefnite Xarleto AC.        When seen on 12/21/2020 he reported 15 # unintentional weight loss, and denied having seen his PCP about his thyroid nodule as instructed to do by Dr. Duval on his 12/2019 discharge summary. So, I obtained labs (to includeTSH, T4 free, T3 Free, Thyroid peroxidase antibody, Anti thyroglobulin antibody, Thyroid stimulating immunoglobulin) and imaging( US Thyroid,NM Thyroid Uptake and Scan). He presented for f/u of those 1/24/2021. His FT3 was low, likely due to Hashimoto's thyroiditis and his thyroid Abs were elevated. His uptake scan was unremarkable, and his ultrasound revealed an indeterminate 3.7 x 3.7 x 3.1 cm nodule in the inferior left lobe.    When seen 12/29/2021,  all labs and imaging were reviewed with the patient. He was informed he has Hashimoto's thyroiditis, and was started on thyroid replacement hormonal therapy. He was advised to f/u with his PCP regarding this matter. He had a normal TSH on 4/20/2021. Regarding his first lifetime VTE of LLE DVT and bilateral PE for which he was s/p left CIV stenting 12/8/19, his most recent d dimer was normal, down from 9.2. His duplex revealed persistent chronic-appearing occlusive thrombus throughout the left femoral vein, and nonocclusive thrombus in the central portion of the posterior tibial vein. He had mild to moderate MR, mild TR, normal LVEF, mild to moderate pulmonary htn on TTE. He was tolerating AC without clinically significant  bruising or bleeding, so we elected to continue Xarleto due to pulmonary htn. Recent recheck d dimer, LLE duplex, TTE in 12/2022 revealed a persistently normal d dimer, and unchanged chronic DVT, as well as resolution of previous mild pulmonary htn.     He RTC at this itme to disucss need for continued indefnite Xarleto AC.           Review Of  Systems  Skin: negative  Eyes: negative  Ears/Nose/Throat: negative  Respiratory: No shortness of breath, dyspnea on exertion, cough, or hemoptysis  Cardiovascular: negative  Gastrointestinal: negative  Genitourinary: negative  Musculoskeletal: negative  Neurologic: negative  Psychiatric: negative  Hematologic/Lymphatic/Immunologic: negative  Endocrine: negative        PAST MEDICAL HISTORY:                  Past Medical History        Past Medical History:   Diagnosis Date     Cholelithiasis       Glaucoma       Testicular cancer (H)              PAST SURGICAL HISTORY:                  Past Surgical History         Past Surgical History:   Procedure Laterality Date     HC LAP,ORCHIECTOMY         INCISION LIMBAL RELAXING, KERATOTOMY ARCUATE   5/6/2013     Procedure: INCISION LIMBAL RELAXING, KERATOTOMY ARCUATE;;  Surgeon: Scott Cruz MD;  Location: [facility]     IR LOWER EXTREMITY VENOGRAM LEFT   12/8/2019     jaw orif[         LAPAROSCOPIC CHOLECYSTECTOMY         LASER SELECTIVE TRABECULOPLASTY Left 12/19/2016     Procedure: LASER SELECTIVE TRABECULOPLASTY;  Surgeon: Scott Cruz MD;  Location: [facility]     PHACOEMULSIFICATION CLEAR CORNEA WITH STANDARD INTRAOCULAR LENS IMPLANT   5/6/2013     Procedure: PHACOEMULSIFICATION CLEAR CORNEA WITH STANDARD INTRAOCULAR LENS IMPLANT;  LEFT PHACOEMULSIFICATION CLEAR CORNEA WITH STANDARD INTRAOCULAR LENS IMPLANT, WITH LIMBAL RELAXING INCISION;  Surgeon: Scott Cruz MD;  Location: [facility]            CURRENT MEDICATIONS:                  Current Outpatient Prescriptions          Current Outpatient Medications   Medication Sig Dispense Refill     hydroxychloroquine (PLAQUENIL) 200 MG tablet Take 150 mg by mouth daily          levothyroxine (SYNTHROID/LEVOTHROID) 50 MCG tablet Take 1 tablet (50 mcg) by mouth daily 90 tablet 3     travoprost SIENA FREE (TRAVATAN Z) 0.004 % ophthalmic solution Place 1 drop into both eyes every evening          XARELTO ANTICOAGULANT 10 MG TABS  tablet TAKE 1 TABLET(10 MG) BY MOUTH DAILY WITH DINNER 30 tablet 3     amoxicillin-clavulanate (AUGMENTIN) 875-125 MG tablet Take 1 tablet by mouth every 12 hours (Patient not taking: Reported on 12/29/2021) 14 tablet 0     pantoprazole (PROTONIX) 40 MG EC tablet Take 1 tablet (40 mg) by mouth every morning (before breakfast) (Patient not taking: Reported on 12/29/2021) 90 tablet 1     predniSONE (DELTASONE) 5 MG tablet Take 10 mg by mouth daily  (Patient not taking: Reported on 12/29/2021)                ALLERGIES:                No Known Allergies     SOCIAL HISTORY:                  Social History   Social History            Socioeconomic History     Marital status: Single       Spouse name: Not on file     Number of children: Not on file     Years of education: Not on file     Highest education level: Not on file   Occupational History     Not on file   Tobacco Use     Smoking status: Never Smoker     Smokeless tobacco: Never Used   Substance and Sexual Activity     Alcohol use: Yes       Comment: OCC     Drug use: No     Sexual activity: Never   Other Topics Concern     Not on file   Social History Narrative     Not on file      Social Determinants of Health      Financial Resource Strain: Not on file   Food Insecurity: Not on file   Transportation Needs: Not on file   Physical Activity: Not on file   Stress: Not on file   Social Connections: Not on file   Intimate Partner Violence: Not on file   Housing Stability: Not on file            FAMILY HISTORY:                   Family History   No family history on file.           Physical exam Reveals:     O/P: WNL  HEENT: WNL  NECK: No JVD, thyromegaly, or lymphadenopathy  HEART: RRR, no murmurs, gallops, or rubs  LUNGS: CTA bilaterally without rales, wheezes, or rhonchi  GI: NABS, nondistended, nontender, soft  EXT:without cyanosis, clubbing, or edema  NEURO: nonfocal  : no flank tenderness        Component      Latest Ref Rng & Units 12/7/2019 12/17/2020  2021   D-Dimer      0.0 - 0.50 ug/ml FEU 9.2 (H) 0.3       Thyroid Stim Immunog      <=1.3 TSI index     1.2     TSH      0.40 - 4.00 mU/L     1.06     T4 Free      0.76 - 1.46 ng/dL     0.87     Free T3      2.3 - 4.2 pg/mL     2.1 (L)     Thyroid Peroxidase Antibody      <35 IU/mL     145 (H)     Thyroglobulin Antibody      <40 IU/mL     168 (H)     D-Dimer Quantitative      0.00 - 0.50 ug/mL FEU       0.29        Component      Latest Ref Rng & Units 2022   D-Dimer Quantitative      0.00 - 0.50 ug/mL FEU 0.44     [id number]  [id number]  [id number]  [id number]^GABY^PAYAL^BIPIN     [facility]  [facility]  Echocardiography Laboratory  [address]  [address]  [place] [address]  Phone [phone]  Fax [phone]     Name: SHAYNE JUSTINDIANA OLSEN JR.  MRN: [id number]  : 1935  Study Date: 2022 01:52 PM  Age: 87 yrs  Gender: Male  Patient Location: [facility]  Reason For Study: Acute deep vein thrombosis (DVT) of iliac vein of left lower  ext  Ordering Physician: PAYAL GRIFFIN  Referring Physician: Stewart Kim  Performed By: Cash Fraga RDCS     BSA: 1.7 m2  Height: 66 in  Weight: 140 lb  HR: 75  BP: 176/77 mmHg  ______________________________________________________________________________  ______________________________________________________________________________  Interpretation Summary     1. Left ventricular systolic function is normal. The visual ejection fraction  is 60-65%.  2. No regional wall motion abnormalities noted.  3. The right ventricle is normal in structure, function and size.  4. There is mild bileaflet mitral valve prolapse. The mitral regurgitant jet  is posteriorly directed, which is consistent with anterior leaflet pathology.  There is at least moderate (2+) mitral regurgitation; eccentric jet may  underestimate the degree of insufficiency.  5. In comparison with the prior study, there has been no  significant change.  ______________________________________________________________________________  Left Ventricle  The left ventricle is normal in size. Left ventricular systolic function is  normal. The visual ejection fraction is 60-65%. Grade I or early diastolic  dysfunction. No regional wall motion abnormalities noted.     Right Ventricle  The right ventricle is normal in structure, function and size.     Atria  Normal left atrial size. Right atrial size is normal. There is no color  Doppler evidence of an atrial shunt.     Mitral Valve  The mitral valve leaflets appear thickened, but open well. There is mild  bileaflet prolapse. The mitral regurgitant jet is posteriorly directed, which  is consistent with anterior leaflet pathology. There is moderate (2+) mitral  regurgitation.     Tricuspid Valve  The tricuspid valve is normal in structure and function. There is mild (1+)  tricuspid regurgitation.     Aortic Valve  The aortic valve is normal in structure and function. There is mild (1+)  aortic regurgitation.     Vessels  Mild aortic root dilatation. IVC diameter <2.1 cm collapsing >50% with sniff  suggests a normal RA pressure of 3 mmHg.  ______________________________________________________________________________  MMode/2D Measurements & Calculations  IVSd: 1.1 cm  LVIDd: 4.5 cm  LVIDs: 3.5 cm  LVPWd: 1.1 cm  FS: 22.4 %  LV mass(C)d: 167.9 grams  LV mass(C)dI: 97.7 grams/m2  Ao root diam: 3.9 cm  LA dimension: 4.3 cm  asc Aorta Diam: 3.3 cm  LA/Ao: 1.1  LA Volume (BP): 58.3 ml     LA Volume Index (BP): 33.9 ml/m2  RWT: 0.48     Doppler Measurements & Calculations  MV E max jayda: 68.7 cm/sec  MV A max jayda: 71.0 cm/sec  MV E/A: 0.97  MV dec slope: 275.9 cm/sec2  MV dec time: 0.28 sec  AI P1/2t: 552.8 msec  PA acc time: 0.15 sec  TR max jayda: 265.8 cm/sec  TR max P.3 mmHg  E/E' avg: 10.7  Lateral E/e': 9.3  Medial E/e': 12.1      ______________________________________________________________________________  Report approved by: Laurent Lua 12/02/2022 03:29 PM             US LOWER EXTREMITY VENOUS DUPLEX LEFT, US AORTA/IVC/ILIAC DUPLEX  COMPLETE  12/5/2022 9:36 AM      HISTORY: eval for change in chronic DVT; Acute deep vein thrombosis  (DVT) of iliac vein of left lower extremity (H)     COMPARISON: Ultrasound dated 12/27/2021     FINDINGS:  Examination of the deep veins with graded compression and  color flow Doppler with spectral wave form analysis was performed.   The left common iliac vein stent is patent.     The left mid distal superficial femoral vein is occluded. The left  posterior tibial vein is occluded from its origin to the proximal  calf. Remaining deep veins of the left lower extremity are patent.  Overall, there has been no significant change.                                                                      IMPRESSION: No significant change in left lower extremity DVT. Patent  left common iliac vein stent.     ANI ABAD MD       INDICATION: History of left lower extremity DVT                                                                      IMPRESSION:  1. Negative for acute left lower extremity deep vein thrombosis.   2. 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 Compared to the prior study no significant change     EXAM DESCRIPTION AND FINDINGS:  A noninvasive lower extremity and limited iliac venous ultrasound exam  was performed using duplex imaging with spectral and color Doppler.  The previously implanted stent in the left common iliac vein appears  patent. The left common femoral vein is widely patent. There is a  chronically appearing occlusive thrombus in the left femoral vein and  chronic occlusive thrombus in the posterior tibial vein. Popliteal  vein is patent. There appears to be a collateral vein from the  popliteal to the common femoral vein. Overall, no  significant change  when compared to the last study.     STUDY QUALITY: Excellent        [facility]  Dedicated to Excellence in Vascular Care     To schedule a Vascular Lab study or consultation  Call [phone]. OPTION 2.              JAYESH AMOR MD               [id number]  [id number]  [id number]  [id number]^GABY^PAYAL^BIPIN     [facility]  [facility]  Echocardiography Laboratory  [address]  [address]  [place] [address]  Phone [phone]  Fax [phone]     Name: JUSTIN BELLA Shelly  MRN: [id number]  : 1935  Study Date: 2021 12:45 PM  Age: 86 yrs  Gender: Male  Patient Location: [facility]  Reason For Study: Pulmonary hypertension (H)  Ordering Physician: PAYAL GRIFFIN  Referring Physician: PAYAL GRIFFIN  Performed By: KO Peacock     BSA: 1.7 m2  Height: 67 in  Weight: 141 lb  HR: 78  BP: 137/79 mmHg  ______________________________________________________________________________  Procedure  Complete Echo Adult.     ______________________________________________________________________________  Interpretation Summary     There is moderate concentric left ventricular hypertrophy.  The visual ejection fraction is 60-65%.  The right ventricle is normal in structure, function and size.  The left atrium is mild to moderately dilated.  There is mild to moderate (1-2+) mitral regurgitation.  There is mild (1+) aortic regurgitation.  Mild aortic root dilatation.  Compared to previous study findings are similar  ______________________________________________________________________________  Left Ventricle  The left ventricle is normal in size. There is moderate concentric left  ventricular hypertrophy. Left ventricular systolic function is normal. The  visual ejection fraction is 60-65%. Grade II or moderate diastolic  dysfunction. No regional wall motion abnormalities  noted.     Right Ventricle  The right ventricle is normal in structure, function and size.     Atria  The left atrium is mild to moderately dilated. Right atrial size is normal.  There is no color Doppler evidence of an atrial shunt.     Mitral Valve  The mitral valve leaflets appear thickened, but open well. Mild mitral valve  prolapse, bileaflet. There is mild to moderate (1-2+) mitral regurgitation.  The mitral regurgitant jet is eccentrically directed.     Tricuspid Valve  The tricuspid valve is normal in structure and function. There is mild (1+)  tricuspid regurgitation. The right ventricular systolic pressure is  approximated at 29.3 mmHg plus the right atrial pressure. Right ventricular  systolic pressure is elevated, consistent with mild pulmonary hypertension.     Aortic Valve  There is mild trileaflet aortic sclerosis. There is mild (1+) aortic  regurgitation.     Pulmonic Valve  The pulmonic valve is not well visualized. There is no pulmonic valvular  regurgitation.     Vessels  Mild aortic root dilatation. The inferior vena cava is normal.     Pericardium  There is no pericardial effusion.     Rhythm  Sinus rhythm was noted.     ______________________________________________________________________________  MMode/2D Measurements & Calculations  IVSd: 1.4 cm  LVIDd: 4.4 cm  LVIDs: 2.0 cm  LVPWd: 1.5 cm  FS: 54.4 %  LV mass(C)d: 255.1 grams  LV mass(C)dI: 146.3 grams/m2  Ao root diam: 4.0 cm     asc Aorta Diam: 3.3 cm  LVOT diam: 2.0 cm  LVOT area: 3.3 cm2  LA Volume Indexed (AL/bp): 36.3 ml/m2  RWT: 0.67     Doppler Measurements & Calculations  MV E max prabhu: 75.1 cm/sec  MV A max prabhu: 88.4 cm/sec  MV E/A: 0.85  MV dec time: 0.16 sec  AI P1/2t: 385.0 msec  PA acc time: 0.15 sec     TR max prabhu: 270.5 cm/sec  TR max P.3 mmHg  Pulm Sys Prabhu: 62.8 cm/sec  Pulm Benedict Prabhu: 36.6 cm/sec  Pulm A Revs Prabhu: 35.2 cm/sec  Pulm S/D: 1.7  E/E': 14.9  Peak E' Prabhu: 5.0  cm/sec     ______________________________________________________________________________  Report approved by: Sneha Shetty 12/20/2021 02:20 PM              US THYROID 12/28/2020 1:16 PM     CLINICAL HISTORY: Unintentional weight loss. Thyroid nodule.  TECHNIQUE: Thyroid ultrasound.      COMPARISON: None.      FINDINGS:  RIGHT lobe: 5.9 x 1.2 x 2.5 cm. Heterogenous echotexture.  Isthmus: 5 mm.  LEFT lobe: 6.5 x 1.7 x 3.1 cm. Heterogenous echotexture.     NODULES:     Nodule 1: 3.7 x 3.7 x 3.1 cm nodule in the inferior left lobe   Composition: Solid or almost completely solid, 2 points   Echogenicity: Hyperechoic or isoechoic, 1 point   Shape: Not taller than wide, 0 points   Margin: Smooth, 0 points   Echogenic Foci: Punctate echoic foci, 3 points   Point Total: 4-6 points. TI-RADS 4. If 1.5 cm or larger, recommend  FNA; if 1 cm or larger, follow up US (annually for 5 years).                                                                          IMPRESSION: One indeterminant thyroid nodule in the inferior left  lobe.     Nodules are characterized per  ACR Thyroid Imaging, Reporting and Data System (TI-RADS): White Paper  of the ACR TI-RADS Committee  Ed Goldsmith. et al. Journal of the American College of  Radiology 2017. Volume 14 (2017), Issue 5, 175-420.      PHI SCOTT MD      NUCLEAR MEDICINE THYROID UPTAKE/SCAN  December 29, 2020 2:08 PM     HISTORY: Prior deep vein thrombosis/pulmonary embolism. Incidentally  discovered thyroid nodule, evaluate for hot versus cold thyroid  nodule. Unintentional weight loss. Thyroid nodule.     DOSE: 247 uCi I-123 po on 12-      FINDINGS:    Thyroid 24-hour total uptake: 22% (normal = 10 to 30%).  Differential uptake on right: 10%  Differential uptake on left: 12%.     Thyroid gland computer estimated total weight: 95.3 gm.  Right gland estimate: 54.5 gm.  Left gland estimate: 40.8 gm.     The thyroid scan appears within normal limits with no  apparent hot or  cold nodules.                                                                       IMPRESSION: Unremarkable thyroid uptake and scan.     PHI SCOTT MD     A/P:             (I82.422) First lifetime thromboembolic event of left lower extremity DVT and bilateral PE s/p venogram with left common iliac vein stenting 12/8/19  Comment: His most recent d dimer is normal, down from 9.2. His duplex revealed persistent chronic-appearing occlusive thrombus throughout the left femoral vein, and nonocclusive thrombus in the central portion of the posterior tibial vein. He has mild to moderate MR, mild TR, normal LVEF, mild to moderate pulmonary htn. He was tolerating AC without clinically significant  bruising or bleeding, so we elected to continue Xarleto due to pulmonary htn. Recent recheck d dimer, LLE duplex, TTE in 12/2022 revealed a normal d dimer, unchanged chronic DVT, and resolution of previous mild pulmonary htn.  Plan: Stop Xarelto AC when he runs out of the drug around 1/15/23, check D dimer quantitative one month off of AC, RTC one week later. Check iliac vein duplex in one year to insure stent patency , order at time of next visit.            36 minutes total medical care on today's date.

## 2022-12-08 NOTE — PROGRESS NOTES
"Patient is here to discuss follow up.    BP (!) 149/85 (BP Location: Right arm, Patient Position: Chair, Cuff Size: Adult Large)   Pulse 76   Ht 5' 6\" (1.676 m)   Wt 140 lb (63.5 kg)   SpO2 98%   BMI 22.60 kg/m      Questions patient would like addressed today are: N/A.    Refills are needed: No    Has homecare services and agency name:  Jennie Nash  "

## 2023-02-22 ENCOUNTER — VIRTUAL VISIT (OUTPATIENT)
Dept: OTHER | Facility: CLINIC | Age: 88
End: 2023-02-22
Attending: INTERNAL MEDICINE
Payer: COMMERCIAL

## 2023-02-22 DIAGNOSIS — I82.422 ACUTE DEEP VEIN THROMBOSIS (DVT) OF ILIAC VEIN OF LEFT LOWER EXTREMITY (H): Primary | ICD-10-CM

## 2023-02-22 PROCEDURE — 99213 OFFICE O/P EST LOW 20 MIN: CPT | Mod: 95 | Performed by: INTERNAL MEDICINE

## 2023-02-22 NOTE — PROGRESS NOTES
Alec Boston Jr. is a 87 year old male who is presenting at the current time to discuss his diagnosi(es) of         Hashimoto's thyroiditis  Acute deep vein thrombosis (DVT) of iliac vein of left lower extremity (H)  Pulmonary hypertension (H)  Compression of vein  Other acute pulmonary embolism without acute cor pulmonale (H)  .           HPI:     Alec Boston Jr. is an 87 year old male looking much younger than his states age who presented to the emergency department on 12/6/19 with 3 days of streaks of blood with coughing episodes and acutely developing left flank pain and more notable hemoptysis when leaving a concert at [employer]. He went straight to the ER and was found to have bilateral pulmonary emboli with a heart rate initially in the 120s range, tachypneic to 18.  CT imaging demonstrated also early left pulmonary infarct, likely explaining patient's left flank pain.  Note that initial work-up was initially concerning for renal stone given patient's location and description of discomfort, though this led to an abdomen/pelvis CT with possible basilar infiltrates.  A d-dimer was sent which was significantly elevated in the 9 range, and subsequent CT PE study resulted with pulmonary infarct and bilateral pulmonary embolism.     He was hemodynamically stable. Echocardiogram did not show any right heart strain. His pain was well controlled.  He continued to have some hemoptysis with approximately dime size bloody sputum. Lower extremity venous duplex was significant for DVT throughout the entire left leg extending through the external iliac and common iliac veins.     Patient is healthy for an 87-year-old.  He spends 2 months a year in a cottage in [facility] with his partner, spends winter in [facility], teaches piano lessons, and worked with [employer] for over 30 years.  His only medications prior to March 2019 were his eyedrops.  Most recently he was initiated on prolonged  low-dose steroid taper bridging to Plaquenil for what appears to be seronegative rheumatoid arthritis by Dr. Oquendo of Rheumatology, though no clear diagnosis exist currently.  Patient continues to have resolution of his hand/joint symptoms and was started on 5 mg daily of his prednisone while continuing Plaquenil when last seen. He is now off of the prednisone.     Last flight was in October, 2019 returning from [place].  Patient describes no lower extremity swelling, no upper extremity swelling at that time.  No calf pain.  No family history of clotting disorders.  No personal history of blood clot. No recent trauma.      Given the extent of clot in his left lower extremity, it was felt he would benefit from a mechanical thrombectomy. He was taken to IR on 12/8/19. However, this surprisingly did not show any left lower extremity thrombus. He did have some left iliac vein stenosis, and this was stented.  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 Repeat duplex confirmed this to be the case.      He is presently doing well.      He had repeat TTE done 12/20/2021 revealing persistent mild pulmonary htn, and had LE duplex done revealing persistent DVT in his paired femoral veins. D dimer was not done as ordered.      When  seen on 6/24/2020,  he was noted to have DVT which had not resorbed in leg, and was found to have  residual pulmonary htn in lungs. We therefore continued AC, but reduced the dose to 10 mg daily. He returned six months later on 12/21/2020 to go over results of d dimer , TTE, repeat duplex. That d dimer was normal, down from 9.2. His duplex revealed persistent chronic-appearing occlusive thrombus throughout the left femoral vein, and nonocclusive thrombus in the central portion of the posterior tibial vein. He has mild to moderate MR, mild TR, normal LVEF, mild to moderate pulmonary htn. He was tolerating AC without clinically significant  bruising or bleeding, so we elected to  continue Xarleto due to pulmonary htn. Plans are to recheck d dimer, LLE duplex, TTE in one year, RTC at that itme to discuss need for continued indefnite Xarleto AC.        When seen on 12/21/2020 he reported 15 # unintentional weight loss, and denied having seen his PCP about his thyroid nodule as instructed to do by Dr. Duval on his 12/2019 discharge summary. So, I obtained labs (to includeTSH, T4 free, T3 Free, Thyroid peroxidase antibody, Anti thyroglobulin antibody, Thyroid stimulating immunoglobulin) and imaging( US Thyroid,NM Thyroid Uptake and Scan). He presented for f/u of those 1/24/2021. His FT3 was low, likely due to Hashimoto's thyroiditis and his thyroid Abs were elevated. His uptake scan was unremarkable, and his ultrasound revealed an indeterminate 3.7 x 3.7 x 3.1 cm nodule in the inferior left lobe.     When seen 12/29/2021,  all labs and imaging were reviewed with the patient. He was informed he has Hashimoto's thyroiditis, and was started on thyroid replacement hormonal therapy. He was advised to f/u with his PCP regarding this matter. He had a normal TSH on 4/20/2021. Regarding his first lifetime VTE of LLE DVT and bilateral PE for which he was s/p left CIV stenting 12/8/19, his most recent d dimer was normal, down from 9.2. His duplex revealed persistent chronic-appearing occlusive thrombus throughout the left femoral vein, and nonocclusive thrombus in the central portion of the posterior tibial vein. He had mild to moderate MR, mild TR, normal LVEF, mild to moderate pulmonary htn on TTE. He was tolerating AC without clinically significant  bruising or bleeding, so we elected to continue Xarleto due to pulmonary htn. Recent recheck d dimer, LLE duplex, TTE in 12/2022 revealed a persistently normal d dimer, and unchanged chronic DVT, as well as resolution of previous mild pulmonary htn.      He RTC at this itme to disucss need for continued indefnite Xarleto AC.           Review Of  Systems  Skin: negative  Eyes: negative  Ears/Nose/Throat: negative  Respiratory: No shortness of breath, dyspnea on exertion, cough, or hemoptysis  Cardiovascular: negative  Gastrointestinal: negative  Genitourinary: negative  Musculoskeletal: negative  Neurologic: negative  Psychiatric: negative  Hematologic/Lymphatic/Immunologic: negative  Endocrine: negative        PAST MEDICAL HISTORY:                  Past Medical History           Past Medical History:   Diagnosis Date     Cholelithiasis       Glaucoma       Testicular cancer (H)              PAST SURGICAL HISTORY:                  Past Surgical History             Past Surgical History:   Procedure Laterality Date     HC LAP,ORCHIECTOMY         INCISION LIMBAL RELAXING, KERATOTOMY ARCUATE   5/6/2013     Procedure: INCISION LIMBAL RELAXING, KERATOTOMY ARCUATE;;  Surgeon: Scott Cruz MD;  Location: [facility]     IR LOWER EXTREMITY VENOGRAM LEFT   12/8/2019     jaw orif[         LAPAROSCOPIC CHOLECYSTECTOMY         LASER SELECTIVE TRABECULOPLASTY Left 12/19/2016     Procedure: LASER SELECTIVE TRABECULOPLASTY;  Surgeon: Scott Cruz MD;  Location: [facility]     PHACOEMULSIFICATION CLEAR CORNEA WITH STANDARD INTRAOCULAR LENS IMPLANT   5/6/2013     Procedure: PHACOEMULSIFICATION CLEAR CORNEA WITH STANDARD INTRAOCULAR LENS IMPLANT;  LEFT PHACOEMULSIFICATION CLEAR CORNEA WITH STANDARD INTRAOCULAR LENS IMPLANT, WITH LIMBAL RELAXING INCISION;  Surgeon: Scott Cruz MD;  Location: [facility]            CURRENT MEDICATIONS:                  Current Outpatient Prescriptions               Current Outpatient Medications   Medication Sig Dispense Refill     hydroxychloroquine (PLAQUENIL) 200 MG tablet Take 150 mg by mouth daily          levothyroxine (SYNTHROID/LEVOTHROID) 50 MCG tablet Take 1 tablet (50 mcg) by mouth daily 90 tablet 3     travoprost SIENA FREE (TRAVATAN Z) 0.004 % ophthalmic solution Place 1 drop into both eyes every evening          XARELTO ANTICOAGULANT  10 MG TABS tablet TAKE 1 TABLET(10 MG) BY MOUTH DAILY WITH DINNER 30 tablet 3     amoxicillin-clavulanate (AUGMENTIN) 875-125 MG tablet Take 1 tablet by mouth every 12 hours (Patient not taking: Reported on 12/29/2021) 14 tablet 0     pantoprazole (PROTONIX) 40 MG EC tablet Take 1 tablet (40 mg) by mouth every morning (before breakfast) (Patient not taking: Reported on 12/29/2021) 90 tablet 1     predniSONE (DELTASONE) 5 MG tablet Take 10 mg by mouth daily  (Patient not taking: Reported on 12/29/2021)                ALLERGIES:                No Known Allergies     SOCIAL HISTORY:                  Social History   Social History                Socioeconomic History     Marital status: Single       Spouse name: Not on file     Number of children: Not on file     Years of education: Not on file     Highest education level: Not on file   Occupational History     Not on file   Tobacco Use     Smoking status: Never Smoker     Smokeless tobacco: Never Used   Substance and Sexual Activity     Alcohol use: Yes       Comment: OCC     Drug use: No     Sexual activity: Never   Other Topics Concern     Not on file   Social History Narrative     Not on file      Social Determinants of Health      Financial Resource Strain: Not on file   Food Insecurity: Not on file   Transportation Needs: Not on file   Physical Activity: Not on file   Stress: Not on file   Social Connections: Not on file   Intimate Partner Violence: Not on file   Housing Stability: Not on file            FAMILY HISTORY:                   Family History   No family history on file.            Physical exam Reveals:     O/P: WNL  HEENT: WNL  NECK: No JVD, thyromegaly, or lymphadenopathy  HEART: RRR, no murmurs, gallops, or rubs  LUNGS: CTA bilaterally without rales, wheezes, or rhonchi  GI: NABS, nondistended, nontender, soft  EXT:without cyanosis, clubbing, or edema  NEURO: nonfocal  : no flank tenderness        Component      Latest Ref Rng & Units 12/7/2019  2020   D-Dimer      0.0 - 0.50 ug/ml FEU 9.2 (H) 0.3       Thyroid Stim Immunog      <=1.3 TSI index     1.2     TSH      0.40 - 4.00 mU/L     1.06     T4 Free      0.76 - 1.46 ng/dL     0.87     Free T3      2.3 - 4.2 pg/mL     2.1 (L)     Thyroid Peroxidase Antibody      <35 IU/mL     145 (H)     Thyroglobulin Antibody      <40 IU/mL     168 (H)     D-Dimer Quantitative      0.00 - 0.50 ug/mL FEU       0.29         Component      Latest Ref Rng & Units 2022   D-Dimer Quantitative      0.00 - 0.50 ug/mL FEU 0.44      [id number]  [id number]  [id number]  [id number]^GABY^PAYAL^BIPIN     [facility]  [facility] Heart  Echocardiography Laboratory  [address]  [place] [address]  Phone [phone]  Fax [phone]     Name: SHAYNE JUSTINDIANA OLSEN JR.  MRN: [id number]  : 1935  Study Date: 2022 01:52 PM  Age: 87 yrs  Gender: Male  Patient Location: [facility]  Reason For Study: Acute deep vein thrombosis (DVT) of iliac vein of left lower  ext  Ordering Physician: PAYAL GRIFFIN  Referring Physician: Stewart Kim  Performed By: Cash Fraga RDCS     BSA: 1.7 m2  Height: 66 in  Weight: 140 lb  HR: 75  BP: 176/77 mmHg  ______________________________________________________________________________  ______________________________________________________________________________  Interpretation Summary     1. Left ventricular systolic function is normal. The visual ejection fraction  is 60-65%.  2. No regional wall motion abnormalities noted.  3. The right ventricle is normal in structure, function and size.  4. There is mild bileaflet mitral valve prolapse. The mitral regurgitant jet  is posteriorly directed, which is consistent with anterior leaflet pathology.  There is at least moderate (2+) mitral regurgitation; eccentric jet may  underestimate the degree of insufficiency.  5. In comparison with the prior study, there  has been no significant change.  ______________________________________________________________________________  Left Ventricle  The left ventricle is normal in size. Left ventricular systolic function is  normal. The visual ejection fraction is 60-65%. Grade I or early diastolic  dysfunction. No regional wall motion abnormalities noted.     Right Ventricle  The right ventricle is normal in structure, function and size.     Atria  Normal left atrial size. Right atrial size is normal. There is no color  Doppler evidence of an atrial shunt.     Mitral Valve  The mitral valve leaflets appear thickened, but open well. There is mild  bileaflet prolapse. The mitral regurgitant jet is posteriorly directed, which  is consistent with anterior leaflet pathology. There is moderate (2+) mitral  regurgitation.     Tricuspid Valve  The tricuspid valve is normal in structure and function. There is mild (1+)  tricuspid regurgitation.     Aortic Valve  The aortic valve is normal in structure and function. There is mild (1+)  aortic regurgitation.     Vessels  Mild aortic root dilatation. IVC diameter <2.1 cm collapsing >50% with sniff  suggests a normal RA pressure of 3 mmHg.  ______________________________________________________________________________  MMode/2D Measurements & Calculations  IVSd: 1.1 cm  LVIDd: 4.5 cm  LVIDs: 3.5 cm  LVPWd: 1.1 cm  FS: 22.4 %  LV mass(C)d: 167.9 grams  LV mass(C)dI: 97.7 grams/m2  Ao root diam: 3.9 cm  LA dimension: 4.3 cm  asc Aorta Diam: 3.3 cm  LA/Ao: 1.1  LA Volume (BP): 58.3 ml     LA Volume Index (BP): 33.9 ml/m2  RWT: 0.48     Doppler Measurements & Calculations  MV E max jayda: 68.7 cm/sec  MV A max jayda: 71.0 cm/sec  MV E/A: 0.97  MV dec slope: 275.9 cm/sec2  MV dec time: 0.28 sec  AI P1/2t: 552.8 msec  PA acc time: 0.15 sec  TR max jayda: 265.8 cm/sec  TR max P.3 mmHg  E/E' avg: 10.7  Lateral E/e': 9.3  Medial E/e':  12.1     ______________________________________________________________________________  Report approved by: Laurent Lua 12/02/2022 03:29 PM                 US LOWER EXTREMITY VENOUS DUPLEX LEFT, US AORTA/IVC/ILIAC DUPLEX  COMPLETE  12/5/2022 9:36 AM      HISTORY: eval for change in chronic DVT; Acute deep vein thrombosis  (DVT) of iliac vein of left lower extremity (H)     COMPARISON: Ultrasound dated 12/27/2021     FINDINGS:  Examination of the deep veins with graded compression and  color flow Doppler with spectral wave form analysis was performed.   The left common iliac vein stent is patent.     The left mid distal superficial femoral vein is occluded. The left  posterior tibial vein is occluded from its origin to the proximal  calf. Remaining deep veins of the left lower extremity are patent.  Overall, there has been no significant change.                                                                      IMPRESSION: No significant change in left lower extremity DVT. Patent  left common iliac vein stent.     ANI ABAD MD         INDICATION: History of left lower extremity DVT                                                                      IMPRESSION:  1. Negative for acute left lower extremity deep vein thrombosis.   2. 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 Compared to the prior study no significant change     EXAM DESCRIPTION AND FINDINGS:  A noninvasive lower extremity and limited iliac venous ultrasound exam  was performed using duplex imaging with spectral and color Doppler.  The previously implanted stent in the left common iliac vein appears  patent. The left common femoral vein is widely patent. There is a  chronically appearing occlusive thrombus in the left femoral vein and  chronic occlusive thrombus in the posterior tibial vein. Popliteal  vein is patent. There appears to be a collateral vein from the  popliteal to the common femoral  vein. Overall, no significant change  when compared to the last study.     STUDY QUALITY: Excellent        [facility]  Dedicated to Excellence in Vascular Care     To schedule a Vascular Lab study or consultation  Call [phone]. OPTION 2.              JAYESH AMOR MD               [id number]  [id number]  [id number]  [id number]^GABY^PAYAL^BIPIN     [facility]  [facility]  Echocardiography Laboratory  [address]  [place] [address]  Phone [phone]  Fax [phone]     Name: JUSTIN BELLA JR. RAÚL  MRN: [id number]  : 1935  Study Date: 2021 12:45 PM  Age: 86 yrs  Gender: Male  Patient Location: [facility]  Reason For Study: Pulmonary hypertension (H)  Ordering Physician: PAYAL GRIFFIN  Referring Physician: PAYAL GRIFFIN  Performed By: KO Peacock     BSA: 1.7 m2  Height: 67 in  Weight: 141 lb  HR: 78  BP: 137/79 mmHg  ______________________________________________________________________________  Procedure  Complete Echo Adult.     ______________________________________________________________________________  Interpretation Summary     There is moderate concentric left ventricular hypertrophy.  The visual ejection fraction is 60-65%.  The right ventricle is normal in structure, function and size.  The left atrium is mild to moderately dilated.  There is mild to moderate (1-2+) mitral regurgitation.  There is mild (1+) aortic regurgitation.  Mild aortic root dilatation.  Compared to previous study findings are similar  ______________________________________________________________________________  Left Ventricle  The left ventricle is normal in size. There is moderate concentric left  ventricular hypertrophy. Left ventricular systolic function is normal. The  visual ejection fraction is 60-65%. Grade II or moderate diastolic  dysfunction. No regional wall motion  abnormalities noted.     Right Ventricle  The right ventricle is normal in structure, function and size.     Atria  The left atrium is mild to moderately dilated. Right atrial size is normal.  There is no color Doppler evidence of an atrial shunt.     Mitral Valve  The mitral valve leaflets appear thickened, but open well. Mild mitral valve  prolapse, bileaflet. There is mild to moderate (1-2+) mitral regurgitation.  The mitral regurgitant jet is eccentrically directed.     Tricuspid Valve  The tricuspid valve is normal in structure and function. There is mild (1+)  tricuspid regurgitation. The right ventricular systolic pressure is  approximated at 29.3 mmHg plus the right atrial pressure. Right ventricular  systolic pressure is elevated, consistent with mild pulmonary hypertension.     Aortic Valve  There is mild trileaflet aortic sclerosis. There is mild (1+) aortic  regurgitation.     Pulmonic Valve  The pulmonic valve is not well visualized. There is no pulmonic valvular  regurgitation.     Vessels  Mild aortic root dilatation. The inferior vena cava is normal.     Pericardium  There is no pericardial effusion.     Rhythm  Sinus rhythm was noted.     ______________________________________________________________________________  MMode/2D Measurements & Calculations  IVSd: 1.4 cm  LVIDd: 4.4 cm  LVIDs: 2.0 cm  LVPWd: 1.5 cm  FS: 54.4 %  LV mass(C)d: 255.1 grams  LV mass(C)dI: 146.3 grams/m2  Ao root diam: 4.0 cm     asc Aorta Diam: 3.3 cm  LVOT diam: 2.0 cm  LVOT area: 3.3 cm2  LA Volume Indexed (AL/bp): 36.3 ml/m2  RWT: 0.67     Doppler Measurements & Calculations  MV E max prabhu: 75.1 cm/sec  MV A max prabhu: 88.4 cm/sec  MV E/A: 0.85  MV dec time: 0.16 sec  AI P1/2t: 385.0 msec  PA acc time: 0.15 sec     TR max prabhu: 270.5 cm/sec  TR max P.3 mmHg  Pulm Sys Prabhu: 62.8 cm/sec  Pulm Benedict Prabhu: 36.6 cm/sec  Pulm A Revs Prabhu: 35.2 cm/sec  Pulm S/D: 1.7  E/E': 14.9  Peak E' Prabhu: 5.0  cm/sec     ______________________________________________________________________________  Report approved by: Sneha Shetty 12/20/2021 02:20 PM              US THYROID 12/28/2020 1:16 PM     CLINICAL HISTORY: Unintentional weight loss. Thyroid nodule.  TECHNIQUE: Thyroid ultrasound.      COMPARISON: None.      FINDINGS:  RIGHT lobe: 5.9 x 1.2 x 2.5 cm. Heterogenous echotexture.  Isthmus: 5 mm.  LEFT lobe: 6.5 x 1.7 x 3.1 cm. Heterogenous echotexture.     NODULES:     Nodule 1: 3.7 x 3.7 x 3.1 cm nodule in the inferior left lobe   Composition: Solid or almost completely solid, 2 points   Echogenicity: Hyperechoic or isoechoic, 1 point   Shape: Not taller than wide, 0 points   Margin: Smooth, 0 points   Echogenic Foci: Punctate echoic foci, 3 points   Point Total: 4-6 points. TI-RADS 4. If 1.5 cm or larger, recommend  FNA; if 1 cm or larger, follow up US (annually for 5 years).                                                                          IMPRESSION: One indeterminant thyroid nodule in the inferior left  lobe.     Nodules are characterized per  ACR Thyroid Imaging, Reporting and Data System (TI-RADS): White Paper  of the ACR TI-RADS Committee  Ed Goldsmith. et al. Journal of the American College of  Radiology 2017. Volume 14 (2017), Issue 5, 737-026.      PHI SCOTT MD      NUCLEAR MEDICINE THYROID UPTAKE/SCAN  December 29, 2020 2:08 PM     HISTORY: Prior deep vein thrombosis/pulmonary embolism. Incidentally  discovered thyroid nodule, evaluate for hot versus cold thyroid  nodule. Unintentional weight loss. Thyroid nodule.     DOSE: 247 uCi I-123 po on 12-      FINDINGS:    Thyroid 24-hour total uptake: 22% (normal = 10 to 30%).  Differential uptake on right: 10%  Differential uptake on left: 12%.     Thyroid gland computer estimated total weight: 95.3 gm.  Right gland estimate: 54.5 gm.  Left gland estimate: 40.8 gm.     The thyroid scan appears within normal limits with no  apparent hot or  cold nodules.                                                                       IMPRESSION: Unremarkable thyroid uptake and scan.     PHI SCOTT MD     A/P:              (I82.422) First lifetime thromboembolic event of left lower extremity DVT and bilateral PE s/p venogram with left common iliac vein stenting 12/8/19  Comment: His 12/2/22 d dimer was normal, down from 9.2. His duplex revealed persistent chronic-appearing occlusive thrombus throughout the left femoral vein, and nonocclusive thrombus in the central portion of the posterior tibial vein. He had mild to moderate MR, mild TR, normal LVEF, mild to moderate pulmonary htn. He was tolerating AC without clinically significant  bruising or bleeding, so we elected to continue Xarleto due to pulmonary htn. Recent recheck d dimer, LLE duplex, TTE in 12/2022 revealed a normal d dimer, unchanged chronic DVT, and resolution of previous mild pulmonary htn, so we stopped Xarelto AC when he ran out of the drug around 1/15/23, and checked D dimer quantitative off of AC on 2/6/23. That d dimer is normal for age at 0.81. He RTC today to discuss results.  Plan:  Check iliac vein duplex in 12/2023 to insure stent patency. Given that d dimer is normal for age, continue to remain off of AC, but monitor d dimer given that it did numerically increase. RTC one week after d dimer draw            22 minutes total medical care on today's date.    Phone call start: 14:11  Phone call end: 14:22    MD location: office  Pt location: home

## 2023-02-22 NOTE — PROGRESS NOTES
Alec is a 87 year old who is being evaluated via a billable telephone visit.      What phone number would you like to be contacted at? [phone]    How would you like to obtain your AVS? Sarah Beauliue MA

## 2023-02-23 ENCOUNTER — TELEPHONE (OUTPATIENT)
Dept: OTHER | Facility: CLINIC | Age: 88
End: 2023-02-23
Payer: COMMERCIAL

## 2023-02-23 NOTE — TELEPHONE ENCOUNTER
Left voicemail with instructions for patient to call back to schedule their appointment(s)    February 23, 2023 , 8:31 AM

## 2023-03-03 ENCOUNTER — LAB (OUTPATIENT)
Dept: LAB | Facility: CLINIC | Age: 88
End: 2023-03-03
Payer: COMMERCIAL

## 2023-03-03 DIAGNOSIS — I82.422 ACUTE DEEP VEIN THROMBOSIS (DVT) OF ILIAC VEIN OF LEFT LOWER EXTREMITY (H): ICD-10-CM

## 2023-03-03 PROCEDURE — 85379 FIBRIN DEGRADATION QUANT: CPT

## 2023-03-03 PROCEDURE — 36415 COLL VENOUS BLD VENIPUNCTURE: CPT

## 2023-03-03 NOTE — TELEPHONE ENCOUNTER
Future Appointments   Date Time Provider Department Center   3/3/2023  4:00 PM CS [facility]   3/30/2023  1:40 PM Louie Santos MD [facility]

## 2023-03-04 LAB — D DIMER PPP FEU-MCNC: 0.67 UG/ML FEU (ref 0–0.5)

## 2023-03-30 ENCOUNTER — OFFICE VISIT (OUTPATIENT)
Dept: OTHER | Facility: CLINIC | Age: 88
End: 2023-03-30
Attending: INTERNAL MEDICINE
Payer: COMMERCIAL

## 2023-03-30 VITALS
DIASTOLIC BLOOD PRESSURE: 75 MMHG | WEIGHT: 140 LBS | BODY MASS INDEX: 22.6 KG/M2 | OXYGEN SATURATION: 98 % | HEART RATE: 82 BPM | SYSTOLIC BLOOD PRESSURE: 167 MMHG

## 2023-03-30 DIAGNOSIS — I82.422 ACUTE DEEP VEIN THROMBOSIS (DVT) OF ILIAC VEIN OF LEFT LOWER EXTREMITY (H): Primary | ICD-10-CM

## 2023-03-30 PROCEDURE — 99214 OFFICE O/P EST MOD 30 MIN: CPT | Performed by: INTERNAL MEDICINE

## 2023-03-30 PROCEDURE — G0463 HOSPITAL OUTPT CLINIC VISIT: HCPCS | Performed by: INTERNAL MEDICINE

## 2023-03-30 PROCEDURE — G0463 HOSPITAL OUTPT CLINIC VISIT: HCPCS

## 2023-03-30 RX ORDER — DOXYCYCLINE HYCLATE 50 MG/1
1 TABLET, FILM COATED ORAL
COMMUNITY
Start: 2023-03-10 | End: 2023-07-17

## 2023-03-30 NOTE — PROGRESS NOTES
[facility]        Patient is here for a  follow up.     Pt is currently taking no meds that would impact our treatment plan.    BP (!) 167/75 (BP Location: Right arm, Patient Position: Chair, Cuff Size: Adult Regular)   Pulse 82   Wt 140 lb (63.5 kg)   SpO2 98%   BMI 22.60 kg/m      The provider has been notified that the patient has no concerns.     Questions patient would like addressed today are: N/A.    Refills are needed: N/A    Has homecare services and agency name:  Jennie Beaulieu MA

## 2023-03-30 NOTE — PROGRESS NOTES
Alec Boston Jr. is a 88 year old male who is presenting at the current time to discuss his diagnosi(es) of         Hashimoto's thyroiditis  Acute deep vein thrombosis (DVT) of iliac vein of left lower extremity (H)  Pulmonary hypertension (H)  Compression of vein  Other acute pulmonary embolism without acute cor pulmonale (H)  .           HPI:     Alec Boston Jr. is an 88 year old male looking much younger than his states age who presented to the emergency department on 12/6/19 with 3 days of streaks of blood with coughing episodes and acutely developing left flank pain and more notable hemoptysis when leaving a concert at [email or web address]. He went straight to the ER and was found to have bilateral pulmonary emboli with a heart rate initially in the 120s range, tachypneic to 18.  CT imaging demonstrated also early left pulmonary infarct, likely explaining patient's left flank pain.  Note that initial work-up was initially concerning for renal stone given patient's location and description of discomfort, though this led to an abdomen/pelvis CT with possible basilar infiltrates.  A d-dimer was sent which was significantly elevated in the 9 range, and subsequent CT PE study resulted with pulmonary infarct and bilateral pulmonary embolism.     He was hemodynamically stable. Echocardiogram did not show any right heart strain. His pain was well controlled.  He continued to have some hemoptysis with approximately dime size bloody sputum. Lower extremity venous duplex was significant for DVT throughout the entire left leg extending through the external iliac and common iliac veins.     Patient is healthy for an 87-year-old.  He spends 2 months a year in a cottage in [facility] with his partner, spends winter in [place], teaches piano lessons, and worked with [employer] for over 30 years.  His only medications prior to March 2019 were his eyedrops.  Most recently he was initiated on prolonged  low-dose steroid taper bridging to Plaquenil for what appears to be seronegative rheumatoid arthritis by Dr. Oquendo of Rheumatology, though no clear diagnosis exist currently.  Patient continues to have resolution of his hand/joint symptoms and was started on 5 mg daily of his prednisone while continuing Plaquenil when last seen. He is now off of the prednisone.     Last flight was in October, 2019 returning from [place].  Patient describes no lower extremity swelling, no upper extremity swelling at that time.  No calf pain.  No family history of clotting disorders.  No personal history of blood clot. No recent trauma.      Given the extent of clot in his left lower extremity, it was felt he would benefit from a mechanical thrombectomy. He was taken to IR on 12/8/19. However, this surprisingly did not show any left lower extremity thrombus. He did have some left iliac vein stenosis, and this was stented.  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 Repeat duplex confirmed this to be the case.      He is presently doing well.      He had repeat TTE done 12/20/2021 revealing persistent mild pulmonary htn, and had LE duplex done revealing persistent DVT in his paired femoral veins. D dimer was not done as ordered.      When  seen on 6/24/2020,  he was noted to have DVT which had not resorbed in leg, and was found to have  residual pulmonary htn in lungs. We therefore continued AC, but reduced the dose to 10 mg daily. He returned six months later on 12/21/2020 to go over results of d dimer , TTE, repeat duplex. That d dimer was normal, down from 9.2. His duplex revealed persistent chronic-appearing occlusive thrombus throughout the left femoral vein, and nonocclusive thrombus in the central portion of the posterior tibial vein. He has mild to moderate MR, mild TR, normal LVEF, mild to moderate pulmonary htn. He was tolerating AC without clinically significant  bruising or bleeding, so we elected to  continue Xarleto due to pulmonary htn. Plans are to recheck d dimer, LLE duplex, TTE in one year, RTC at that itme to discuss need for continued indefnite Xarleto AC.        When seen on 12/21/2020 he reported 15 # unintentional weight loss, and denied having seen his PCP about his thyroid nodule as instructed to do by Dr. Duval on his 12/2019 discharge summary. So, I obtained labs (to includeTSH, T4 free, T3 Free, Thyroid peroxidase antibody, Anti thyroglobulin antibody, Thyroid stimulating immunoglobulin) and imaging( US Thyroid,NM Thyroid Uptake and Scan). He presented for f/u of those 1/24/2021. His FT3 was low, likely due to Hashimoto's thyroiditis and his thyroid Abs were elevated. His uptake scan was unremarkable, and his ultrasound revealed an indeterminate 3.7 x 3.7 x 3.1 cm nodule in the inferior left lobe.     When seen 12/29/2021,  all labs and imaging were reviewed with the patient. He was informed he has Hashimoto's thyroiditis, and was started on thyroid replacement hormonal therapy. He was advised to f/u with his PCP regarding this matter. He had a normal TSH on 4/20/2021. Regarding his first lifetime VTE of LLE DVT and bilateral PE for which he was s/p left CIV stenting 12/8/19, his most recent d dimer was normal, down from 9.2. His duplex revealed persistent chronic-appearing occlusive thrombus throughout the left femoral vein, and nonocclusive thrombus in the central portion of the posterior tibial vein. He had mild to moderate MR, mild TR, normal LVEF, mild to moderate pulmonary htn on TTE. He was tolerating AC without clinically significant  bruising or bleeding, so we elected to continue Xarleto due to pulmonary htn. Recent recheck d dimer, LLE duplex, TTE in 12/2022 revealed a persistently normal d dimer, and unchanged chronic DVT, as well as resolution of previous mild pulmonary htn.      He RTC at this itme to disucss need for continued indefnite Xarleto AC.           Review Of  Systems  Skin: negative  Eyes: negative  Ears/Nose/Throat: negative  Respiratory: No shortness of breath, dyspnea on exertion, cough, or hemoptysis  Cardiovascular: negative  Gastrointestinal: negative  Genitourinary: negative  Musculoskeletal: negative  Neurologic: negative  Psychiatric: negative  Hematologic/Lymphatic/Immunologic: negative  Endocrine: negative        PAST MEDICAL HISTORY:                  Past Medical History           Past Medical History:   Diagnosis Date     Cholelithiasis       Glaucoma       Testicular cancer (H)              PAST SURGICAL HISTORY:                  Past Surgical History             Past Surgical History:   Procedure Laterality Date     HC LAP,ORCHIECTOMY         INCISION LIMBAL RELAXING, KERATOTOMY ARCUATE   5/6/2013     Procedure: INCISION LIMBAL RELAXING, KERATOTOMY ARCUATE;;  Surgeon: Scott Cruz MD;  Location: [facility]     IR LOWER EXTREMITY VENOGRAM LEFT   12/8/2019     jaw orif[         LAPAROSCOPIC CHOLECYSTECTOMY         LASER SELECTIVE TRABECULOPLASTY Left 12/19/2016     Procedure: LASER SELECTIVE TRABECULOPLASTY;  Surgeon: Scott Cruz MD;  Location: [facility]     PHACOEMULSIFICATION CLEAR CORNEA WITH STANDARD INTRAOCULAR LENS IMPLANT   5/6/2013     Procedure: PHACOEMULSIFICATION CLEAR CORNEA WITH STANDARD INTRAOCULAR LENS IMPLANT;  LEFT PHACOEMULSIFICATION CLEAR CORNEA WITH STANDARD INTRAOCULAR LENS IMPLANT, WITH LIMBAL RELAXING INCISION;  Surgeon: Scott Cruz MD;  Location: [facility]            CURRENT MEDICATIONS:                  Current Outpatient Prescriptions               Current Outpatient Medications   Medication Sig Dispense Refill     hydroxychloroquine (PLAQUENIL) 200 MG tablet Take 150 mg by mouth daily          levothyroxine (SYNTHROID/LEVOTHROID) 50 MCG tablet Take 1 tablet (50 mcg) by mouth daily 90 tablet 3     travoprost SIENA FREE (TRAVATAN Z) 0.004 % ophthalmic solution Place 1 drop into both eyes every evening          XARELTO ANTICOAGULANT  10 MG TABS tablet TAKE 1 TABLET(10 MG) BY MOUTH DAILY WITH DINNER 30 tablet 3     amoxicillin-clavulanate (AUGMENTIN) 875-125 MG tablet Take 1 tablet by mouth every 12 hours (Patient not taking: Reported on 12/29/2021) 14 tablet 0     pantoprazole (PROTONIX) 40 MG EC tablet Take 1 tablet (40 mg) by mouth every morning (before breakfast) (Patient not taking: Reported on 12/29/2021) 90 tablet 1     predniSONE (DELTASONE) 5 MG tablet Take 10 mg by mouth daily  (Patient not taking: Reported on 12/29/2021)                ALLERGIES:                No Known Allergies     SOCIAL HISTORY:                  Social History   Social History                Socioeconomic History     Marital status: Single       Spouse name: Not on file     Number of children: Not on file     Years of education: Not on file     Highest education level: Not on file   Occupational History     Not on file   Tobacco Use     Smoking status: Never Smoker     Smokeless tobacco: Never Used   Substance and Sexual Activity     Alcohol use: Yes       Comment: OCC     Drug use: No     Sexual activity: Never   Other Topics Concern     Not on file   Social History Narrative     Not on file      Social Determinants of Health      Financial Resource Strain: Not on file   Food Insecurity: Not on file   Transportation Needs: Not on file   Physical Activity: Not on file   Stress: Not on file   Social Connections: Not on file   Intimate Partner Violence: Not on file   Housing Stability: Not on file            FAMILY HISTORY:                   Family History   No family history on file.            Physical exam Reveals:     O/P: WNL  HEENT: WNL  NECK: No JVD, thyromegaly, or lymphadenopathy  HEART: RRR, no murmurs, gallops, or rubs  LUNGS: CTA bilaterally without rales, wheezes, or rhonchi  GI: NABS, nondistended, nontender, soft  EXT:without cyanosis, clubbing, or edema  NEURO: nonfocal  : no flank tenderness        Component      Latest Ref Rng & Units 12/7/2019  2020   D-Dimer      0.0 - 0.50 ug/ml FEU 9.2 (H) 0.3       Thyroid Stim Immunog      <=1.3 TSI index     1.2     TSH      0.40 - 4.00 mU/L     1.06     T4 Free      0.76 - 1.46 ng/dL     0.87     Free T3      2.3 - 4.2 pg/mL     2.1 (L)     Thyroid Peroxidase Antibody      <35 IU/mL     145 (H)     Thyroglobulin Antibody      <40 IU/mL     168 (H)     D-Dimer Quantitative      0.00 - 0.50 ug/mL FEU       0.29         Component      Latest Ref Rng & Units 2022   D-Dimer Quantitative      0.00 - 0.50 ug/mL FEU 0.44      Component      Latest Ref Rng & Units 3/3/2023   D-Dimer Quantitative      0.00 - 0.50 ug/mL FEU 0.67 (H)       [id number]  [id number]  [id number]  [id number]^GABY^PAYAL^BIPIN     [facility]  [facility]  [address]  [address]  [place] [address]  Phone [phone]  Fax [phone]     Name: LINH BELLADIANA OLSEN JR.  MRN: [id number]  : 1935  Study Date: 2022 01:52 PM  Age: 87 yrs  Gender: Male  Patient Location: [facility]  Reason For Study: Acute deep vein thrombosis (DVT) of iliac vein of left lower  ext  Ordering Physician: PAYAL GRIFFIN  Referring Physician: Stewart Kim  Performed By: Cash Fraga RDCS     BSA: 1.7 m2  Height: 66 in  Weight: 140 lb  HR: 75  BP: 176/77 mmHg  ______________________________________________________________________________  ______________________________________________________________________________  Interpretation Summary     1. Left ventricular systolic function is normal. The visual ejection fraction  is 60-65%.  2. No regional wall motion abnormalities noted.  3. The right ventricle is normal in structure, function and size.  4. There is mild bileaflet mitral valve prolapse. The mitral regurgitant jet  is posteriorly directed, which is consistent with anterior leaflet pathology.  There is at least moderate (2+) mitral  regurgitation; eccentric jet may  underestimate the degree of insufficiency.  5. In comparison with the prior study, there has been no significant change.  ______________________________________________________________________________  Left Ventricle  The left ventricle is normal in size. Left ventricular systolic function is  normal. The visual ejection fraction is 60-65%. Grade I or early diastolic  dysfunction. No regional wall motion abnormalities noted.     Right Ventricle  The right ventricle is normal in structure, function and size.     Atria  Normal left atrial size. Right atrial size is normal. There is no color  Doppler evidence of an atrial shunt.     Mitral Valve  The mitral valve leaflets appear thickened, but open well. There is mild  bileaflet prolapse. The mitral regurgitant jet is posteriorly directed, which  is consistent with anterior leaflet pathology. There is moderate (2+) mitral  regurgitation.     Tricuspid Valve  The tricuspid valve is normal in structure and function. There is mild (1+)  tricuspid regurgitation.     Aortic Valve  The aortic valve is normal in structure and function. There is mild (1+)  aortic regurgitation.     Vessels  Mild aortic root dilatation. IVC diameter <2.1 cm collapsing >50% with sniff  suggests a normal RA pressure of 3 mmHg.  ______________________________________________________________________________  MMode/2D Measurements & Calculations  IVSd: 1.1 cm  LVIDd: 4.5 cm  LVIDs: 3.5 cm  LVPWd: 1.1 cm  FS: 22.4 %  LV mass(C)d: 167.9 grams  LV mass(C)dI: 97.7 grams/m2  Ao root diam: 3.9 cm  LA dimension: 4.3 cm  asc Aorta Diam: 3.3 cm  LA/Ao: 1.1  LA Volume (BP): 58.3 ml     LA Volume Index (BP): 33.9 ml/m2  RWT: 0.48     Doppler Measurements & Calculations  MV E max jayda: 68.7 cm/sec  MV A max jayda: 71.0 cm/sec  MV E/A: 0.97  MV dec slope: 275.9 cm/sec2  MV dec time: 0.28 sec  AI P1/2t: 552.8 msec  PA acc time: 0.15 sec  TR max jayda: 265.8 cm/sec  TR max P.3  mmHg  E/E' avg: 10.7  Lateral E/e': 9.3  Medial E/e': 12.1     ______________________________________________________________________________  Report approved by: Laurent Lua 12/02/2022 03:29 PM                 US LOWER EXTREMITY VENOUS DUPLEX LEFT, US AORTA/IVC/ILIAC DUPLEX  COMPLETE  12/5/2022 9:36 AM      HISTORY: eval for change in chronic DVT; Acute deep vein thrombosis  (DVT) of iliac vein of left lower extremity (H)     COMPARISON: Ultrasound dated 12/27/2021     FINDINGS:  Examination of the deep veins with graded compression and  color flow Doppler with spectral wave form analysis was performed.   The left common iliac vein stent is patent.     The left mid distal superficial femoral vein is occluded. The left  posterior tibial vein is occluded from its origin to the proximal  calf. Remaining deep veins of the left lower extremity are patent.  Overall, there has been no significant change.                                                                      IMPRESSION: No significant change in left lower extremity DVT. Patent  left common iliac vein stent.     ANI ABAD MD         INDICATION: History of left lower extremity DVT                                                                      IMPRESSION:  1. Negative for acute left lower extremity deep vein thrombosis.   2. 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 Compared to the prior study no significant change     EXAM DESCRIPTION AND FINDINGS:  A noninvasive lower extremity and limited iliac venous ultrasound exam  was performed using duplex imaging with spectral and color Doppler.  The previously implanted stent in the left common iliac vein appears  patent. The left common femoral vein is widely patent. There is a  chronically appearing occlusive thrombus in the left femoral vein and  chronic occlusive thrombus in the posterior tibial vein. Popliteal  vein is patent. There appears to be a  collateral vein from the  popliteal to the common femoral vein. Overall, no significant change  when compared to the last study.     STUDY QUALITY: Excellent        [facility]  Dedicated to Excellence in Vascular Care     To schedule a Vascular Lab study or consultation  Call [phone]. OPTION 2.              JAYESH AMOR MD               [id number]  [id number]  [id number]  [id number]^GABY^PAYAL^BIPIN     [facility]  [facility]  [address]  [address]  [place] [address]  Phone [phone]  Fax [phone]     Name: JUSTIN BELLA Shelly  MRN: [id number]  : 1935  Study Date: 2021 12:45 PM  Age: 86 yrs  Gender: Male  Patient Location: [facility]  Reason For Study: Pulmonary hypertension (H)  Ordering Physician: PAYAL GRIFFIN  Referring Physician: PAYAL GRIFFIN  Performed By: KO Peacock     BSA: 1.7 m2  Height: 67 in  Weight: 141 lb  HR: 78  BP: 137/79 mmHg  ______________________________________________________________________________  Procedure  Complete Echo Adult.     ______________________________________________________________________________  Interpretation Summary     There is moderate concentric left ventricular hypertrophy.  The visual ejection fraction is 60-65%.  The right ventricle is normal in structure, function and size.  The left atrium is mild to moderately dilated.  There is mild to moderate (1-2+) mitral regurgitation.  There is mild (1+) aortic regurgitation.  Mild aortic root dilatation.  Compared to previous study findings are similar  ______________________________________________________________________________  Left Ventricle  The left ventricle is normal in size. There is moderate concentric left  ventricular hypertrophy. Left ventricular systolic function is normal. The  visual ejection fraction is 60-65%. Grade II or  moderate diastolic  dysfunction. No regional wall motion abnormalities noted.     Right Ventricle  The right ventricle is normal in structure, function and size.     Atria  The left atrium is mild to moderately dilated. Right atrial size is normal.  There is no color Doppler evidence of an atrial shunt.     Mitral Valve  The mitral valve leaflets appear thickened, but open well. Mild mitral valve  prolapse, bileaflet. There is mild to moderate (1-2+) mitral regurgitation.  The mitral regurgitant jet is eccentrically directed.     Tricuspid Valve  The tricuspid valve is normal in structure and function. There is mild (1+)  tricuspid regurgitation. The right ventricular systolic pressure is  approximated at 29.3 mmHg plus the right atrial pressure. Right ventricular  systolic pressure is elevated, consistent with mild pulmonary hypertension.     Aortic Valve  There is mild trileaflet aortic sclerosis. There is mild (1+) aortic  regurgitation.     Pulmonic Valve  The pulmonic valve is not well visualized. There is no pulmonic valvular  regurgitation.     Vessels  Mild aortic root dilatation. The inferior vena cava is normal.     Pericardium  There is no pericardial effusion.     Rhythm  Sinus rhythm was noted.     ______________________________________________________________________________  MMode/2D Measurements & Calculations  IVSd: 1.4 cm  LVIDd: 4.4 cm  LVIDs: 2.0 cm  LVPWd: 1.5 cm  FS: 54.4 %  LV mass(C)d: 255.1 grams  LV mass(C)dI: 146.3 grams/m2  Ao root diam: 4.0 cm     asc Aorta Diam: 3.3 cm  LVOT diam: 2.0 cm  LVOT area: 3.3 cm2  LA Volume Indexed (AL/bp): 36.3 ml/m2  RWT: 0.67     Doppler Measurements & Calculations  MV E max prabhu: 75.1 cm/sec  MV A max prabhu: 88.4 cm/sec  MV E/A: 0.85  MV dec time: 0.16 sec  AI P1/2t: 385.0 msec  PA acc time: 0.15 sec     TR max prabhu: 270.5 cm/sec  TR max P.3 mmHg  Pulm Sys Prabhu: 62.8 cm/sec  Pulm Benedict Prabhu: 36.6 cm/sec  Pulm A Revs Prabhu: 35.2 cm/sec  Pulm S/D: 1.7  E/E':  14.9  Peak E' Prabhu: 5.0 cm/sec     ______________________________________________________________________________  Report approved by: Sneha Shetty 12/20/2021 02:20 PM              US THYROID 12/28/2020 1:16 PM     CLINICAL HISTORY: Unintentional weight loss. Thyroid nodule.  TECHNIQUE: Thyroid ultrasound.      COMPARISON: None.      FINDINGS:  RIGHT lobe: 5.9 x 1.2 x 2.5 cm. Heterogenous echotexture.  Isthmus: 5 mm.  LEFT lobe: 6.5 x 1.7 x 3.1 cm. Heterogenous echotexture.     NODULES:     Nodule 1: 3.7 x 3.7 x 3.1 cm nodule in the inferior left lobe   Composition: Solid or almost completely solid, 2 points   Echogenicity: Hyperechoic or isoechoic, 1 point   Shape: Not taller than wide, 0 points   Margin: Smooth, 0 points   Echogenic Foci: Punctate echoic foci, 3 points   Point Total: 4-6 points. TI-RADS 4. If 1.5 cm or larger, recommend  FNA; if 1 cm or larger, follow up US (annually for 5 years).                                                                          IMPRESSION: One indeterminant thyroid nodule in the inferior left  lobe.     Nodules are characterized per  ACR Thyroid Imaging, Reporting and Data System (TI-RADS): White Paper  of the ACR TI-RADS Committee  Ed Goldsmith et al. Journal of the American College of  Radiology 2017. Volume 14 (2017), Issue 5, 779-201.      PHI SCOTT MD      NUCLEAR MEDICINE THYROID UPTAKE/SCAN  December 29, 2020 2:08 PM     HISTORY: Prior deep vein thrombosis/pulmonary embolism. Incidentally  discovered thyroid nodule, evaluate for hot versus cold thyroid  nodule. Unintentional weight loss. Thyroid nodule.     DOSE: 247 uCi I-123 po on 12-      FINDINGS:    Thyroid 24-hour total uptake: 22% (normal = 10 to 30%).  Differential uptake on right: 10%  Differential uptake on left: 12%.     Thyroid gland computer estimated total weight: 95.3 gm.  Right gland estimate: 54.5 gm.  Left gland estimate: 40.8 gm.     The thyroid scan appears within normal  limits with no apparent hot or  cold nodules.                                                                       IMPRESSION: Unremarkable thyroid uptake and scan.     PHI SCOTT MD     A/P:              (I82.422) First lifetime thromboembolic event of left lower extremity DVT and bilateral PE s/p venogram with left common iliac vein stenting 12/8/19  Comment: His 12/2/22 d dimer was normal, down from 9.2. His duplex revealed persistent chronic-appearing occlusive thrombus throughout the left femoral vein, and nonocclusive thrombus in the central portion of the posterior tibial vein. He had mild to moderate MR, mild TR, normal LVEF, mild to moderate pulmonary htn. He was tolerating AC without clinically significant  bruising or bleeding, so we elected to continue Xarleto due to pulmonary htn. Recent recheck d dimer, LLE duplex, TTE in 12/2022 revealed a normal d dimer, unchanged chronic DVT, and resolution of previous mild pulmonary htn, so we stopped Xarelto AC when he ran out of the drug around 1/15/23, and checked D dimer quantitative off of AC on 2/6/23. That d dimer was normal for age at 0.81. Given that d dimer was normal for age,we had him continue to remain off of AC, but monitored d dimer on 3/3/23 given that it did numerically increase. That value was lower and also normal for age at 0.67.   He RTC today to discuss results.  Plan: Stay off of AC, recheck d dimer in three months, if that value is normal for age, stop checking d dimer and have pt RTC prn             32 minutes total medical care on today's date.

## 2023-06-07 ENCOUNTER — LAB (OUTPATIENT)
Dept: LAB | Facility: CLINIC | Age: 88
End: 2023-06-07
Payer: COMMERCIAL

## 2023-06-07 DIAGNOSIS — I82.422 ACUTE DEEP VEIN THROMBOSIS (DVT) OF ILIAC VEIN OF LEFT LOWER EXTREMITY (H): ICD-10-CM

## 2023-06-07 LAB — D DIMER PPP FEU-MCNC: 1.05 UG/ML FEU (ref 0–0.5)

## 2023-06-07 PROCEDURE — 85379 FIBRIN DEGRADATION QUANT: CPT

## 2023-06-07 PROCEDURE — 36415 COLL VENOUS BLD VENIPUNCTURE: CPT

## 2023-06-14 ENCOUNTER — OFFICE VISIT (OUTPATIENT)
Dept: OTHER | Facility: CLINIC | Age: 88
End: 2023-06-14
Attending: INTERNAL MEDICINE
Payer: COMMERCIAL

## 2023-06-14 VITALS
SYSTOLIC BLOOD PRESSURE: 131 MMHG | HEIGHT: 68 IN | WEIGHT: 139 LBS | HEART RATE: 67 BPM | OXYGEN SATURATION: 98 % | DIASTOLIC BLOOD PRESSURE: 76 MMHG | BODY MASS INDEX: 21.07 KG/M2

## 2023-06-14 DIAGNOSIS — I82.422 ACUTE DEEP VEIN THROMBOSIS (DVT) OF ILIAC VEIN OF LEFT LOWER EXTREMITY (H): Primary | ICD-10-CM

## 2023-06-14 PROCEDURE — 99214 OFFICE O/P EST MOD 30 MIN: CPT | Performed by: INTERNAL MEDICINE

## 2023-06-14 PROCEDURE — G0463 HOSPITAL OUTPT CLINIC VISIT: HCPCS

## 2023-06-14 NOTE — PROGRESS NOTES
Alec Boston Jr. is a 88 year old male who is presenting at the current time to discuss his diagnosi(es) of         Hashimoto's thyroiditis  Acute deep vein thrombosis (DVT) of iliac vein of left lower extremity (H)  Pulmonary hypertension (H)  Compression of vein  Other acute pulmonary embolism without acute cor pulmonale (H)  .           HPI:     Alec Boston Jr. is an 88 year old male looking much younger than his states age who presented to the emergency department on 12/6/19 with 3 days of streaks of blood with coughing episodes and acutely developing left flank pain and more notable hemoptysis when leaving a concert at [employer]. He went straight to the ER and was found to have bilateral pulmonary emboli with a heart rate initially in the 120s range, tachypneic to 18.  CT imaging demonstrated also early left pulmonary infarct, likely explaining patient's left flank pain.  Note that initial work-up was initially concerning for renal stone given patient's location and description of discomfort, though this led to an abdomen/pelvis CT with possible basilar infiltrates.  A d-dimer was sent which was significantly elevated in the 9 range, and subsequent CT PE study resulted with pulmonary infarct and bilateral pulmonary embolism.     He was hemodynamically stable. Echocardiogram did not show any right heart strain. His pain was well controlled.  He continued to have some hemoptysis with approximately dime size bloody sputum. Lower extremity venous duplex was significant for DVT throughout the entire left leg extending through the external iliac and common iliac veins.     Patient is healthy for an 87-year-old.  He spends 2 months a year [place] in [facility] [facility] with his partner, spends winter in [facility], teaches piano lessons, and worked with [employer] for over 30 years.  His only medications prior to March 2019 were his eyedrops.  Most recently he was initiated on prolonged  low-dose steroid taper bridging to Plaquenil for what appears to be seronegative rheumatoid arthritis by Dr. Oquendo of Rheumatology, though no clear diagnosis exist currently.  Patient continues to have resolution of his hand/joint symptoms and was started on 5 mg daily of his prednisone while continuing Plaquenil when last seen. He is now off of the prednisone.     Last flight was in October, 2019 returning from [place].  Patient describes no lower extremity swelling, no upper extremity swelling at that time.  No calf pain.  No family history of clotting disorders.  No personal history of blood clot. No recent trauma.      Given the extent of clot in his left lower extremity, it was felt he would benefit from a mechanical thrombectomy. He was taken to IR on 12/8/19. However, this surprisingly did not show any left lower extremity thrombus. He did have some left iliac vein stenosis, and this was stented.  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 Repeat duplex confirmed this to be the case.      He is presently doing well.      He had repeat TTE done 12/20/2021 revealing persistent mild pulmonary htn, and had LE duplex done revealing persistent DVT in his paired femoral veins. D dimer was not done as ordered.      When  seen on 6/24/2020,  he was noted to have DVT which had not resorbed in leg, and was found to have  residual pulmonary htn in lungs. We therefore continued AC, but reduced the dose to 10 mg daily. He returned six months later on 12/21/2020 to go over results of d dimer , TTE, repeat duplex. That d dimer was normal, down from 9.2. His duplex revealed persistent chronic-appearing occlusive thrombus throughout the left femoral vein, and nonocclusive thrombus in the central portion of the posterior tibial vein. He has mild to moderate MR, mild TR, normal LVEF, mild to moderate pulmonary htn. He was tolerating AC without clinically significant  bruising or bleeding, so we elected to  continue Xarleto due to pulmonary htn. Plans are to recheck d dimer, LLE duplex, TTE in one year, RTC at that itme to discuss need for continued indefnite Xarleto AC.        When seen on 12/21/2020 he reported 15 # unintentional weight loss, and denied having seen his PCP about his thyroid nodule as instructed to do by Dr. Duval on his 12/2019 discharge summary. So, I obtained labs (to includeTSH, T4 free, T3 Free, Thyroid peroxidase antibody, Anti thyroglobulin antibody, Thyroid stimulating immunoglobulin) and imaging( US Thyroid,NM Thyroid Uptake and Scan). He presented for f/u of those 1/24/2021. His FT3 was low, likely due to Hashimoto's thyroiditis and his thyroid Abs were elevated. His uptake scan was unremarkable, and his ultrasound revealed an indeterminate 3.7 x 3.7 x 3.1 cm nodule in the inferior left lobe.     When seen 12/29/2021,  all labs and imaging were reviewed with the patient. He was informed he has Hashimoto's thyroiditis, and was started on thyroid replacement hormonal therapy. He was advised to f/u with his PCP regarding this matter. He had a normal TSH on 4/20/2021. Regarding his first lifetime VTE of LLE DVT and bilateral PE for which he was s/p left CIV stenting 12/8/19, his most recent d dimer was normal, down from 9.2. His duplex revealed persistent chronic-appearing occlusive thrombus throughout the left femoral vein, and nonocclusive thrombus in the central portion of the posterior tibial vein. He had mild to moderate MR, mild TR, normal LVEF, mild to moderate pulmonary htn on TTE. He was tolerating AC without clinically significant  bruising or bleeding, so we elected to continue Xarleto due to pulmonary htn. Recent recheck d dimer, LLE duplex, TTE in 12/2022 revealed a persistently normal d dimer, and unchanged chronic DVT, as well as resolution of previous mild pulmonary htn.      He RTC at this itme to disucss need for continued indefnite Xarleto AC.           Review Of  Systems  Skin: negative  Eyes: negative  Ears/Nose/Throat: negative  Respiratory: No shortness of breath, dyspnea on exertion, cough, or hemoptysis  Cardiovascular: negative  Gastrointestinal: negative  Genitourinary: negative  Musculoskeletal: negative  Neurologic: negative  Psychiatric: negative  Hematologic/Lymphatic/Immunologic: negative  Endocrine: negative        PAST MEDICAL HISTORY:                  Past Medical History           Past Medical History:   Diagnosis Date     Cholelithiasis       Glaucoma       Testicular cancer (H)              PAST SURGICAL HISTORY:                  Past Surgical History             Past Surgical History:   Procedure Laterality Date     HC LAP,ORCHIECTOMY         INCISION LIMBAL RELAXING, KERATOTOMY ARCUATE   5/6/2013     Procedure: INCISION LIMBAL RELAXING, KERATOTOMY ARCUATE;;  Surgeon: Scott Cruz MD;  Location: [facility]     IR LOWER EXTREMITY VENOGRAM LEFT   12/8/2019     jaw orif[         LAPAROSCOPIC CHOLECYSTECTOMY         LASER SELECTIVE TRABECULOPLASTY Left 12/19/2016     Procedure: LASER SELECTIVE TRABECULOPLASTY;  Surgeon: Scott rCuz MD;  Location: [facility]     PHACOEMULSIFICATION CLEAR CORNEA WITH STANDARD INTRAOCULAR LENS IMPLANT   5/6/2013     Procedure: PHACOEMULSIFICATION CLEAR CORNEA WITH STANDARD INTRAOCULAR LENS IMPLANT;  LEFT PHACOEMULSIFICATION CLEAR CORNEA WITH STANDARD INTRAOCULAR LENS IMPLANT, WITH LIMBAL RELAXING INCISION;  Surgeon: Scott Cruz MD;  Location: [facility]            CURRENT MEDICATIONS:                  Current Outpatient Prescriptions               Current Outpatient Medications   Medication Sig Dispense Refill     hydroxychloroquine (PLAQUENIL) 200 MG tablet Take 150 mg by mouth daily          levothyroxine (SYNTHROID/LEVOTHROID) 50 MCG tablet Take 1 tablet (50 mcg) by mouth daily 90 tablet 3     travoprost SIENA FREE (TRAVATAN Z) 0.004 % ophthalmic solution Place 1 drop into both eyes every evening          XARELTO ANTICOAGULANT  10 MG TABS tablet TAKE 1 TABLET(10 MG) BY MOUTH DAILY WITH DINNER 30 tablet 3     amoxicillin-clavulanate (AUGMENTIN) 875-125 MG tablet Take 1 tablet by mouth every 12 hours (Patient not taking: Reported on 12/29/2021) 14 tablet 0     pantoprazole (PROTONIX) 40 MG EC tablet Take 1 tablet (40 mg) by mouth every morning (before breakfast) (Patient not taking: Reported on 12/29/2021) 90 tablet 1     predniSONE (DELTASONE) 5 MG tablet Take 10 mg by mouth daily  (Patient not taking: Reported on 12/29/2021)                ALLERGIES:                No Known Allergies     SOCIAL HISTORY:                  Social History   Social History                Socioeconomic History     Marital status: Single       Spouse name: Not on file     Number of children: Not on file     Years of education: Not on file     Highest education level: Not on file   Occupational History     Not on file   Tobacco Use     Smoking status: Never Smoker     Smokeless tobacco: Never Used   Substance and Sexual Activity     Alcohol use: Yes       Comment: OCC     Drug use: No     Sexual activity: Never   Other Topics Concern     Not on file   Social History Narrative     Not on file      Social Determinants of Health      Financial Resource Strain: Not on file   Food Insecurity: Not on file   Transportation Needs: Not on file   Physical Activity: Not on file   Stress: Not on file   Social Connections: Not on file   Intimate Partner Violence: Not on file   Housing Stability: Not on file            FAMILY HISTORY:                   Family History   No family history on file.            Physical exam Reveals:     O/P: WNL  HEENT: WNL  NECK: No JVD, thyromegaly, or lymphadenopathy  HEART: RRR, no murmurs, gallops, or rubs  LUNGS: CTA bilaterally without rales, wheezes, or rhonchi  GI: NABS, nondistended, nontender, soft  EXT:without cyanosis, clubbing, or edema  NEURO: nonfocal  : no flank tenderness        Component      Latest Ref Rng & Units 12/7/2019  2020   D-Dimer      0.0 - 0.50 ug/ml FEU 9.2 (H) 0.3       Thyroid Stim Immunog      <=1.3 TSI index     1.2     TSH      0.40 - 4.00 mU/L     1.06     T4 Free      0.76 - 1.46 ng/dL     0.87     Free T3      2.3 - 4.2 pg/mL     2.1 (L)     Thyroid Peroxidase Antibody      <35 IU/mL     145 (H)     Thyroglobulin Antibody      <40 IU/mL     168 (H)     D-Dimer Quantitative      0.00 - 0.50 ug/mL FEU       0.29         Component      Latest Ref Rng & Units 2022   D-Dimer Quantitative      0.00 - 0.50 ug/mL FEU 0.44      Component      Latest Ref Rng & Units 3/3/2023   D-Dimer Quantitative      0.00 - 0.50 ug/mL FEU 0.67 (H)         Component      Latest Ref Rng 2023  2:05 PM   D-Dimer Quantitative      0.00 - 0.50 ug/mL FEU 1.05 (H)       Legend:  (H) High    [id number]  [id number]  [id number]  [id number]^GABY^PAYAL^BIPIN     [facility]  Echocardiography Laboratory  [address]  [place] [address]  Phone [phone]  Fax [phone]     Name: SHAYNE JUSTINDIANA OLSEN JR.  MRN: [id number]  : 1935  Study Date: 2022 01:52 PM  Age: 87 yrs  Gender: Male  Patient Location: [facility]  Reason For Study: Acute deep vein thrombosis (DVT) of iliac vein of left lower  ext  Ordering Physician: PAYAL GRIFFIN  Referring Physician: Stewart Kim  Performed By: Cash Fraga RDCS     BSA: 1.7 m2  Height: 66 in  Weight: 140 lb  HR: 75  BP: 176/77 mmHg  ______________________________________________________________________________  ______________________________________________________________________________  Interpretation Summary     1. Left ventricular systolic function is normal. The visual ejection fraction  is 60-65%.  2. No regional wall motion abnormalities noted.  3. The right ventricle is normal in structure, function and size.  4. There is mild bileaflet mitral valve prolapse. The mitral  regurgitant jet  is posteriorly directed, which is consistent with anterior leaflet pathology.  There is at least moderate (2+) mitral regurgitation; eccentric jet may  underestimate the degree of insufficiency.  5. In comparison with the prior study, there has been no significant change.  ______________________________________________________________________________  Left Ventricle  The left ventricle is normal in size. Left ventricular systolic function is  normal. The visual ejection fraction is 60-65%. Grade I or early diastolic  dysfunction. No regional wall motion abnormalities noted.     Right Ventricle  The right ventricle is normal in structure, function and size.     Atria  Normal left atrial size. Right atrial size is normal. There is no color  Doppler evidence of an atrial shunt.     Mitral Valve  The mitral valve leaflets appear thickened, but open well. There is mild  bileaflet prolapse. The mitral regurgitant jet is posteriorly directed, which  is consistent with anterior leaflet pathology. There is moderate (2+) mitral  regurgitation.     Tricuspid Valve  The tricuspid valve is normal in structure and function. There is mild (1+)  tricuspid regurgitation.     Aortic Valve  The aortic valve is normal in structure and function. There is mild (1+)  aortic regurgitation.     Vessels  Mild aortic root dilatation. IVC diameter <2.1 cm collapsing >50% with sniff  suggests a normal RA pressure of 3 mmHg.  ______________________________________________________________________________  MMode/2D Measurements & Calculations  IVSd: 1.1 cm  LVIDd: 4.5 cm  LVIDs: 3.5 cm  LVPWd: 1.1 cm  FS: 22.4 %  LV mass(C)d: 167.9 grams  LV mass(C)dI: 97.7 grams/m2  Ao root diam: 3.9 cm  LA dimension: 4.3 cm  asc Aorta Diam: 3.3 cm  LA/Ao: 1.1  LA Volume (BP): 58.3 ml     LA Volume Index (BP): 33.9 ml/m2  RWT: 0.48     Doppler Measurements & Calculations  MV E max jayda: 68.7 cm/sec  MV A max jayda: 71.0 cm/sec  MV E/A: 0.97  MV dec  slope: 275.9 cm/sec2  MV dec time: 0.28 sec  AI P1/2t: 552.8 msec  PA acc time: 0.15 sec  TR max jayda: 265.8 cm/sec  TR max P.3 mmHg  E/E' avg: 10.7  Lateral E/e': 9.3  Medial E/e': 12.1     ______________________________________________________________________________  Report approved by: Laurent Lua 2022 03:29 PM                 US LOWER EXTREMITY VENOUS DUPLEX LEFT, US AORTA/IVC/ILIAC DUPLEX  COMPLETE  2022 9:36 AM      HISTORY: eval for change in chronic DVT; Acute deep vein thrombosis  (DVT) of iliac vein of left lower extremity (H)     COMPARISON: Ultrasound dated 2021     FINDINGS:  Examination of the deep veins with graded compression and  color flow Doppler with spectral wave form analysis was performed.   The left common iliac vein stent is patent.     The left mid distal superficial femoral vein is occluded. The left  posterior tibial vein is occluded from its origin to the proximal  calf. Remaining deep veins of the left lower extremity are patent.  Overall, there has been no significant change.                                                                      IMPRESSION: No significant change in left lower extremity DVT. Patent  left common iliac vein stent.     ANI ABAD MD         INDICATION: History of left lower extremity DVT                                                                      IMPRESSION:  1. Negative for acute left lower extremity deep vein thrombosis.   2. 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 Compared to the prior study no significant change     EXAM DESCRIPTION AND FINDINGS:  A noninvasive lower extremity and limited iliac venous ultrasound exam  was performed using duplex imaging with spectral and color Doppler.  The previously implanted stent in the left common iliac vein appears  patent. The left common femoral vein is widely patent. There is a  chronically appearing occlusive thrombus in the left  femoral vein and  chronic occlusive thrombus in the posterior tibial vein. Popliteal  vein is patent. There appears to be a collateral vein from the  popliteal to the common femoral vein. Overall, no significant change  when compared to the last study.     STUDY QUALITY: Excellent        [facility]  Dedicated to Excellence in Vascular Care     To schedule a Vascular Lab study or consultation  Call [phone]. OPTION 2.              JAYESH AMOR MD               [id number]  [id number]  [id number]  [id number]^AGBY^PAYAL^BIPIN     [facility]  Echocardiography [facility]  [address]  [place] [address]  Phone [phone]  Fax [phone]     Name: JUSTIN BELLAJR.  MRN: [id number]  : 1935  Study Date: 2021 12:45 PM  Age: 86 yrs  Gender: Male  Patient Location: [facility]  Reason For Study: Pulmonary hypertension (H)  Ordering Physician: PAYAL GRIFFIN  Referring Physician: PAYAL GRIFFIN  Performed By: HOWIE Peacock     BSA: 1.7 m2  Height: 67 in  Weight: 141 lb  HR: 78  BP: 137/79 mmHg  ______________________________________________________________________________  Procedure  Complete Echo Adult.     ______________________________________________________________________________  Interpretation Summary     There is moderate concentric left ventricular hypertrophy.  The visual ejection fraction is 60-65%.  The right ventricle is normal in structure, function and size.  The left atrium is mild to moderately dilated.  There is mild to moderate (1-2+) mitral regurgitation.  There is mild (1+) aortic regurgitation.  Mild aortic root dilatation.  Compared to previous study findings are similar  ______________________________________________________________________________  Left Ventricle  The left ventricle is normal in size. There is moderate concentric  left  ventricular hypertrophy. Left ventricular systolic function is normal. The  visual ejection fraction is 60-65%. Grade II or moderate diastolic  dysfunction. No regional wall motion abnormalities noted.     Right Ventricle  The right ventricle is normal in structure, function and size.     Atria  The left atrium is mild to moderately dilated. Right atrial size is normal.  There is no color Doppler evidence of an atrial shunt.     Mitral Valve  The mitral valve leaflets appear thickened, but open well. Mild mitral valve  prolapse, bileaflet. There is mild to moderate (1-2+) mitral regurgitation.  The mitral regurgitant jet is eccentrically directed.     Tricuspid Valve  The tricuspid valve is normal in structure and function. There is mild (1+)  tricuspid regurgitation. The right ventricular systolic pressure is  approximated at 29.3 mmHg plus the right atrial pressure. Right ventricular  systolic pressure is elevated, consistent with mild pulmonary hypertension.     Aortic Valve  There is mild trileaflet aortic sclerosis. There is mild (1+) aortic  regurgitation.     Pulmonic Valve  The pulmonic valve is not well visualized. There is no pulmonic valvular  regurgitation.     Vessels  Mild aortic root dilatation. The inferior vena cava is normal.     Pericardium  There is no pericardial effusion.     Rhythm  Sinus rhythm was noted.     ______________________________________________________________________________  MMode/2D Measurements & Calculations  IVSd: 1.4 cm  LVIDd: 4.4 cm  LVIDs: 2.0 cm  LVPWd: 1.5 cm  FS: 54.4 %  LV mass(C)d: 255.1 grams  LV mass(C)dI: 146.3 grams/m2  Ao root diam: 4.0 cm     asc Aorta Diam: 3.3 cm  LVOT diam: 2.0 cm  LVOT area: 3.3 cm2  LA Volume Indexed (AL/bp): 36.3 ml/m2  RWT: 0.67     Doppler Measurements & Calculations  MV E max jayda: 75.1 cm/sec  MV A max jayda: 88.4 cm/sec  MV E/A: 0.85  MV dec time: 0.16 sec  AI P1/2t: 385.0 msec  PA acc time: 0.15 sec     TR max jayda: 270.5  cm/sec  TR max P.3 mmHg  Pulm Sys Prabhu: 62.8 cm/sec  Pulm Benedict Prabhu: 36.6 cm/sec  Pulm A Revs Prabhu: 35.2 cm/sec  Pulm S/D: 1.7  E/E': 14.9  Peak E' Prabhu: 5.0 cm/sec     ______________________________________________________________________________  Report approved by: Sneha Shetty 2021 02:20 PM              US THYROID 2020 1:16 PM     CLINICAL HISTORY: Unintentional weight loss. Thyroid nodule.  TECHNIQUE: Thyroid ultrasound.      COMPARISON: None.      FINDINGS:  RIGHT lobe: 5.9 x 1.2 x 2.5 cm. Heterogenous echotexture.  Isthmus: 5 mm.  LEFT lobe: 6.5 x 1.7 x 3.1 cm. Heterogenous echotexture.     NODULES:     Nodule 1: 3.7 x 3.7 x 3.1 cm nodule in the inferior left lobe   Composition: Solid or almost completely solid, 2 points   Echogenicity: Hyperechoic or isoechoic, 1 point   Shape: Not taller than wide, 0 points   Margin: Smooth, 0 points   Echogenic Foci: Punctate echoic foci, 3 points   Point Total: 4-6 points. TI-RADS 4. If 1.5 cm or larger, recommend  FNA; if 1 cm or larger, follow up US (annually for 5 years).                                                                          IMPRESSION: One indeterminant thyroid nodule in the inferior left  lobe.     Nodules are characterized per  ACR Thyroid Imaging, Reporting and Data System (TI-RADS): White Paper  of the ACR TI-RADS Committee  Ed Goldsmith. et al. Journal of the American College of  Radiology 2017. Volume 14 (2017), Issue 5, 440-371.      PHI SCOTT MD      NUCLEAR MEDICINE THYROID UPTAKE/SCAN  2020 2:08 PM     HISTORY: Prior deep vein thrombosis/pulmonary embolism. Incidentally  discovered thyroid nodule, evaluate for hot versus cold thyroid  nodule. Unintentional weight loss. Thyroid nodule.     DOSE: 247 uCi I-123 po on 2020      FINDINGS:    Thyroid 24-hour total uptake: 22% (normal = 10 to 30%).  Differential uptake on right: 10%  Differential uptake on left: 12%.     Thyroid gland computer  estimated total weight: 95.3 gm.  Right gland estimate: 54.5 gm.  Left gland estimate: 40.8 gm.     The thyroid scan appears within normal limits with no apparent hot or  cold nodules.                                                                       IMPRESSION: Unremarkable thyroid uptake and scan.     PHI SCOTT MD     A/P:              (I82.422) First lifetime thromboembolic event of left lower extremity DVT and bilateral PE s/p venogram with left common iliac vein stenting 12/8/19  Comment: His 12/2/22 d dimer was normal, down from 9.2. His duplex revealed persistent chronic-appearing occlusive thrombus throughout the left femoral vein, and nonocclusive thrombus in the central portion of the posterior tibial vein. He had mild to moderate MR, mild TR, normal LVEF, mild to moderate pulmonary htn. He was tolerating AC without clinically significant  bruising or bleeding, so we elected to continue Xarleto due to pulmonary htn. Recent recheck d dimer, LLE duplex, TTE in 12/2022 revealed a normal d dimer, unchanged chronic DVT, and resolution of previous mild pulmonary htn, so we stopped Xarelto AC when he ran out of the drug around 1/15/23, and checked D dimer quantitative off of AC on 2/6/23. That d dimer was normal for age at 0.81. Given that d dimer was normal for age,we had him continue to remain off of AC, but monitored d dimer on 3/3/23 given that it did numerically increase. That value was lower and also normal for age at 0.67. He RTC today to discuss results. On 3/7/23, I stated he should stay off of AC, recheck d dimer in three months, and that if that value is normal for age, that we should stop checking d dimer and have pt RTC prn. That value is however mildly elevated.  Plan: I discussed options with the patient. His values are slightly abnormal for age now. We could either keep him off of AC and repeat the d dimer in one month or simply resume Xarelto at taht chronic treatment dose of 10 mg daily.  He opts to stay off of Xarelto. Check d dimer 7-14-23. RTC several days later. He leaves for [place] on 7/26/23.          32 minutes total medical care on today's date.

## 2023-06-14 NOTE — PROGRESS NOTES
"Patient is here to discuss follow up    /76 (BP Location: Right arm, Patient Position: Chair, Cuff Size: Adult Regular)   Pulse 67   Ht 5' 7.5\" (1.715 m)   Wt 139 lb (63 kg)   SpO2 98%   BMI 21.45 kg/m      Questions patient would like addressed today are: N/A.    Refills are needed: No    Has homecare services and agency name:  Jennie AMOR    "

## 2023-07-13 ENCOUNTER — LAB (OUTPATIENT)
Dept: LAB | Facility: CLINIC | Age: 88
End: 2023-07-13
Payer: COMMERCIAL

## 2023-07-13 DIAGNOSIS — I82.422 ACUTE DEEP VEIN THROMBOSIS (DVT) OF ILIAC VEIN OF LEFT LOWER EXTREMITY (H): ICD-10-CM

## 2023-07-13 LAB — D DIMER PPP FEU-MCNC: 0.83 UG/ML FEU (ref 0–0.5)

## 2023-07-13 PROCEDURE — 85379 FIBRIN DEGRADATION QUANT: CPT

## 2023-07-13 PROCEDURE — 36415 COLL VENOUS BLD VENIPUNCTURE: CPT

## 2023-07-17 ENCOUNTER — OFFICE VISIT (OUTPATIENT)
Dept: OTHER | Facility: CLINIC | Age: 88
End: 2023-07-17
Attending: INTERNAL MEDICINE
Payer: COMMERCIAL

## 2023-07-17 VITALS
OXYGEN SATURATION: 98 % | HEIGHT: 67 IN | WEIGHT: 139 LBS | HEART RATE: 80 BPM | DIASTOLIC BLOOD PRESSURE: 79 MMHG | BODY MASS INDEX: 21.82 KG/M2 | SYSTOLIC BLOOD PRESSURE: 134 MMHG

## 2023-07-17 DIAGNOSIS — Z86.718 HISTORY OF DEEP VENOUS THROMBOSIS: Primary | ICD-10-CM

## 2023-07-17 PROCEDURE — 99213 OFFICE O/P EST LOW 20 MIN: CPT | Performed by: INTERNAL MEDICINE

## 2023-07-17 PROCEDURE — G0463 HOSPITAL OUTPT CLINIC VISIT: HCPCS

## 2023-07-17 NOTE — PROGRESS NOTES
Alec Boston Jr. is a 88 year old male who is presenting at the current time to discuss his diagnosi(es) of         Hashimoto's thyroiditis  Acute deep vein thrombosis (DVT) of iliac vein of left lower extremity (H)  Pulmonary hypertension (H)  Compression of vein  Other acute pulmonary embolism without acute cor pulmonale (H)  .           HPI:     Alec Boston Jr. is an 88 year old male looking much younger than his states age who presented to the emergency department on 12/6/19 with 3 days of streaks of blood with coughing episodes and acutely developing left flank pain and more notable hemoptysis when leaving a concert at [employer]. He went straight to the ER and was found to have bilateral pulmonary emboli with a heart rate initially in the 120s range, tachypneic to 18.  CT imaging demonstrated also early left pulmonary infarct, likely explaining patient's left flank pain.  Note that initial work-up was initially concerning for renal stone given patient's location and description of discomfort, though this led to an abdomen/pelvis CT with possible basilar infiltrates.  A d-dimer was sent which was significantly elevated in the 9 range, and subsequent CT PE study resulted with pulmonary infarct and bilateral pulmonary embolism.     He was hemodynamically stable. Echocardiogram did not show any right heart strain. His pain was well controlled.  He continued to have some hemoptysis with approximately dime size bloody sputum. Lower extremity venous duplex was significant for DVT throughout the entire left leg extending through the external iliac and common iliac veins.     Patient is healthy for an 87-year-old.  He spends 2 months a year in a cottage in [facility] with his partner, spends winter in [facility], teaches piano lessons, and worked with [employer] for over 30 years.  His only medications prior to March 2019 were his eyedrops.  Most recently he was initiated on prolonged  low-dose steroid taper bridging to Plaquenil for what appears to be seronegative rheumatoid arthritis by Dr. Oquendo of Rheumatology, though no clear diagnosis exist currently.  Patient continues to have resolution of his hand/joint symptoms and was started on 5 mg daily of his prednisone while continuing Plaquenil when last seen. He is now off of the prednisone.     Last flight was in October, 2019 returning from [place].  Patient describes no lower extremity swelling, no upper extremity swelling at that time.  No calf pain.  No family history of clotting disorders.  No personal history of blood clot. No recent trauma.      Given the extent of clot in his left lower extremity, it was felt he would benefit from a mechanical thrombectomy. He was taken to IR on 12/8/19. However, this surprisingly did not show any left lower extremity thrombus. He did have some left iliac vein stenosis, and this was stented.  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 Repeat duplex confirmed this to be the case.      He is presently doing well.      He had repeat TTE done 12/20/2021 revealing persistent mild pulmonary htn, and had LE duplex done revealing persistent DVT in his paired femoral veins. D dimer was not done as ordered.      When  seen on 6/24/2020,  he was noted to have DVT which had not resorbed in leg, and was found to have  residual pulmonary htn in lungs. We therefore continued AC, but reduced the dose to 10 mg daily. He returned six months later on 12/21/2020 to go over results of d dimer , TTE, repeat duplex. That d dimer was normal, down from 9.2. His duplex revealed persistent chronic-appearing occlusive thrombus throughout the left femoral vein, and nonocclusive thrombus in the central portion of the posterior tibial vein. He has mild to moderate MR, mild TR, normal LVEF, mild to moderate pulmonary htn. He was tolerating AC without clinically significant  bruising or bleeding, so we elected to  continue Xarleto due to pulmonary htn. Plans are to recheck d dimer, LLE duplex, TTE in one year, RTC at that itme to discuss need for continued indefnite Xarleto AC.        When seen on 12/21/2020 he reported 15 # unintentional weight loss, and denied having seen his PCP about his thyroid nodule as instructed to do by Dr. Duval on his 12/2019 discharge summary. So, I obtained labs (to includeTSH, T4 free, T3 Free, Thyroid peroxidase antibody, Anti thyroglobulin antibody, Thyroid stimulating immunoglobulin) and imaging( US Thyroid,NM Thyroid Uptake and Scan). He presented for f/u of those 1/24/2021. His FT3 was low, likely due to Hashimoto's thyroiditis and his thyroid Abs were elevated. His uptake scan was unremarkable, and his ultrasound revealed an indeterminate 3.7 x 3.7 x 3.1 cm nodule in the inferior left lobe.     When seen 12/29/2021,  all labs and imaging were reviewed with the patient. He was informed he has Hashimoto's thyroiditis, and was started on thyroid replacement hormonal therapy. He was advised to f/u with his PCP regarding this matter. He had a normal TSH on 4/20/2021. Regarding his first lifetime VTE of LLE DVT and bilateral PE for which he was s/p left CIV stenting 12/8/19, his most recent d dimer was normal, down from 9.2. His duplex revealed persistent chronic-appearing occlusive thrombus throughout the left femoral vein, and nonocclusive thrombus in the central portion of the posterior tibial vein. He had mild to moderate MR, mild TR, normal LVEF, mild to moderate pulmonary htn on TTE. He was tolerating AC without clinically significant  bruising or bleeding, so we elected to continue Xarleto due to pulmonary htn. Recent recheck d dimer, LLE duplex, TTE in 12/2022 revealed a persistently normal d dimer, and unchanged chronic DVT, as well as resolution of previous mild pulmonary htn.      He RTC at this itme to disucss need for continued indefnite Xarleto AC.           Review Of  Systems  Skin: negative  Eyes: negative  Ears/Nose/Throat: negative  Respiratory: No shortness of breath, dyspnea on exertion, cough, or hemoptysis  Cardiovascular: negative  Gastrointestinal: negative  Genitourinary: negative  Musculoskeletal: negative  Neurologic: negative  Psychiatric: negative  Hematologic/Lymphatic/Immunologic: negative  Endocrine: negative        PAST MEDICAL HISTORY:                  Past Medical History           Past Medical History:   Diagnosis Date     Cholelithiasis       Glaucoma       Testicular cancer (H)              PAST SURGICAL HISTORY:                  Past Surgical History             Past Surgical History:   Procedure Laterality Date     HC LAP,ORCHIECTOMY         INCISION LIMBAL RELAXING, KERATOTOMY ARCUATE   5/6/2013     Procedure: INCISION LIMBAL RELAXING, KERATOTOMY ARCUATE;;  Surgeon: Scott Cruz MD;  Location: [facility]     IR LOWER EXTREMITY VENOGRAM LEFT   12/8/2019     jaw orif[         LAPAROSCOPIC CHOLECYSTECTOMY         LASER SELECTIVE TRABECULOPLASTY Left 12/19/2016     Procedure: LASER SELECTIVE TRABECULOPLASTY;  Surgeon: Scott Cruz MD;  Location: [facility]     PHACOEMULSIFICATION CLEAR CORNEA WITH STANDARD INTRAOCULAR LENS IMPLANT   5/6/2013     Procedure: PHACOEMULSIFICATION CLEAR CORNEA WITH STANDARD INTRAOCULAR LENS IMPLANT;  LEFT PHACOEMULSIFICATION CLEAR CORNEA WITH STANDARD INTRAOCULAR LENS IMPLANT, WITH LIMBAL RELAXING INCISION;  Surgeon: Scott Cruz MD;  Location: [facility]            CURRENT MEDICATIONS:                  Current Outpatient Prescriptions               Current Outpatient Medications   Medication Sig Dispense Refill     hydroxychloroquine (PLAQUENIL) 200 MG tablet Take 150 mg by mouth daily          levothyroxine (SYNTHROID/LEVOTHROID) 50 MCG tablet Take 1 tablet (50 mcg) by mouth daily 90 tablet 3     travoprost SIENA FREE (TRAVATAN Z) 0.004 % ophthalmic solution Place 1 drop into both eyes every evening          XARELTO ANTICOAGULANT  10 MG TABS tablet TAKE 1 TABLET(10 MG) BY MOUTH DAILY WITH DINNER 30 tablet 3     amoxicillin-clavulanate (AUGMENTIN) 875-125 MG tablet Take 1 tablet by mouth every 12 hours (Patient not taking: Reported on 12/29/2021) 14 tablet 0     pantoprazole (PROTONIX) 40 MG EC tablet Take 1 tablet (40 mg) by mouth every morning (before breakfast) (Patient not taking: Reported on 12/29/2021) 90 tablet 1     predniSONE (DELTASONE) 5 MG tablet Take 10 mg by mouth daily  (Patient not taking: Reported on 12/29/2021)                ALLERGIES:                No Known Allergies     SOCIAL HISTORY:                  Social History   Social History                Socioeconomic History     Marital status: Single       Spouse name: Not on file     Number of children: Not on file     Years of education: Not on file     Highest education level: Not on file   Occupational History     Not on file   Tobacco Use     Smoking status: Never Smoker     Smokeless tobacco: Never Used   Substance and Sexual Activity     Alcohol use: Yes       Comment: OCC     Drug use: No     Sexual activity: Never   Other Topics Concern     Not on file   Social History Narrative     Not on file      Social Determinants of Health      Financial Resource Strain: Not on file   Food Insecurity: Not on file   Transportation Needs: Not on file   Physical Activity: Not on file   Stress: Not on file   Social Connections: Not on file   Intimate Partner Violence: Not on file   Housing Stability: Not on file            FAMILY HISTORY:                   Family History   No family history on file.            Physical exam Reveals:     O/P: WNL  HEENT: WNL  NECK: No JVD, thyromegaly, or lymphadenopathy  HEART: RRR, no murmurs, gallops, or rubs  LUNGS: CTA bilaterally without rales, wheezes, or rhonchi  GI: NABS, nondistended, nontender, soft  EXT:without cyanosis, clubbing, or edema  NEURO: nonfocal  : no flank tenderness        Component      Latest Ref Rng & Units 12/7/2019  2020   D-Dimer      0.0 - 0.50 ug/ml FEU 9.2 (H) 0.3       Thyroid Stim Immunog      <=1.3 TSI index     1.2     TSH      0.40 - 4.00 mU/L     1.06     T4 Free      0.76 - 1.46 ng/dL     0.87     Free T3      2.3 - 4.2 pg/mL     2.1 (L)     Thyroid Peroxidase Antibody      <35 IU/mL     145 (H)     Thyroglobulin Antibody      <40 IU/mL     168 (H)     D-Dimer Quantitative      0.00 - 0.50 ug/mL FEU       0.29         Component      Latest Ref Rng & Units 2022   D-Dimer Quantitative      0.00 - 0.50 ug/mL FEU 0.44      Component      Latest Ref Rng & Units 3/3/2023   D-Dimer Quantitative      0.00 - 0.50 ug/mL FEU 0.67 (H)         Component      Latest Ref Rng 2023  2:05 PM   D-Dimer Quantitative      0.00 - 0.50 ug/mL FEU 1.05 (H)       Legend:  (H) High     [id number]  [id number]  [id number]  [id number]^GABY^PAYAL^BIPIN     [facility] Heart  Echocardiography Laboratory  [address]  [place] [address]  Phone [phone]  Fax [phone]     Name: SHAYNE JUSTINDIANA OLSEN JR.  MRN: [id number]  : 1935  Study Date: 2022 01:52 PM  Age: 87 yrs  Gender: Male  Patient Location: [facility]  Reason For Study: Acute deep vein thrombosis (DVT) of iliac vein of left lower  ext  Ordering Physician: PAYAL GRFIFIN  Referring Physician: Stewart Kim  Performed By: Cash Fraga RDCS     BSA: 1.7 m2  Height: 66 in  Weight: 140 lb  HR: 75  BP: 176/77 mmHg  ______________________________________________________________________________  ______________________________________________________________________________  Interpretation Summary     1. Left ventricular systolic function is normal. The visual ejection fraction  is 60-65%.  2. No regional wall motion abnormalities noted.  3. The right ventricle is normal in structure, function and size.  4. There is mild bileaflet mitral valve prolapse. The mitral  regurgitant jet  is posteriorly directed, which is consistent with anterior leaflet pathology.  There is at least moderate (2+) mitral regurgitation; eccentric jet may  underestimate the degree of insufficiency.  5. In comparison with the prior study, there has been no significant change.  ______________________________________________________________________________  Left Ventricle  The left ventricle is normal in size. Left ventricular systolic function is  normal. The visual ejection fraction is 60-65%. Grade I or early diastolic  dysfunction. No regional wall motion abnormalities noted.     Right Ventricle  The right ventricle is normal in structure, function and size.     Atria  Normal left atrial size. Right atrial size is normal. There is no color  Doppler evidence of an atrial shunt.     Mitral Valve  The mitral valve leaflets appear thickened, but open well. There is mild  bileaflet prolapse. The mitral regurgitant jet is posteriorly directed, which  is consistent with anterior leaflet pathology. There is moderate (2+) mitral  regurgitation.     Tricuspid Valve  The tricuspid valve is normal in structure and function. There is mild (1+)  tricuspid regurgitation.     Aortic Valve  The aortic valve is normal in structure and function. There is mild (1+)  aortic regurgitation.     Vessels  Mild aortic root dilatation. IVC diameter <2.1 cm collapsing >50% with sniff  suggests a normal RA pressure of 3 mmHg.  ______________________________________________________________________________  MMode/2D Measurements & Calculations  IVSd: 1.1 cm  LVIDd: 4.5 cm  LVIDs: 3.5 cm  LVPWd: 1.1 cm  FS: 22.4 %  LV mass(C)d: 167.9 grams  LV mass(C)dI: 97.7 grams/m2  Ao root diam: 3.9 cm  LA dimension: 4.3 cm  asc Aorta Diam: 3.3 cm  LA/Ao: 1.1  LA Volume (BP): 58.3 ml     LA Volume Index (BP): 33.9 ml/m2  RWT: 0.48     Doppler Measurements & Calculations  MV E max jayda: 68.7 cm/sec  MV A max jayda: 71.0 cm/sec  MV E/A: 0.97  MV dec  slope: 275.9 cm/sec2  MV dec time: 0.28 sec  AI P1/2t: 552.8 msec  PA acc time: 0.15 sec  TR max jayda: 265.8 cm/sec  TR max P.3 mmHg  E/E' avg: 10.7  Lateral E/e': 9.3  Medial E/e': 12.1     ______________________________________________________________________________  Report approved by: Laurent Lua 2022 03:29 PM                 US LOWER EXTREMITY VENOUS DUPLEX LEFT, US AORTA/IVC/ILIAC DUPLEX  COMPLETE  2022 9:36 AM      HISTORY: eval for change in chronic DVT; Acute deep vein thrombosis  (DVT) of iliac vein of left lower extremity (H)     COMPARISON: Ultrasound dated 2021     FINDINGS:  Examination of the deep veins with graded compression and  color flow Doppler with spectral wave form analysis was performed.   The left common iliac vein stent is patent.     The left mid distal superficial femoral vein is occluded. The left  posterior tibial vein is occluded from its origin to the proximal  calf. Remaining deep veins of the left lower extremity are patent.  Overall, there has been no significant change.                                                                      IMPRESSION: No significant change in left lower extremity DVT. Patent  left common iliac vein stent.     ANI ABAD MD         INDICATION: History of left lower extremity DVT                                                                      IMPRESSION:  1. Negative for acute left lower extremity deep vein thrombosis.   2. 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 Compared to the prior study no significant change     EXAM DESCRIPTION AND FINDINGS:  A noninvasive lower extremity and limited iliac venous ultrasound exam  was performed using duplex imaging with spectral and color Doppler.  The previously implanted stent in the left common iliac vein appears  patent. The left common femoral vein is widely patent. There is a  chronically appearing occlusive thrombus in the left  femoral vein and  chronic occlusive thrombus in the posterior tibial vein. Popliteal  vein is patent. There appears to be a collateral vein from the  popliteal to the common femoral vein. Overall, no significant change  when compared to the last study.     STUDY QUALITY: Excellent        [facility]  Dedicated to Excellence in Vascular Care     To schedule a Vascular Lab study or consultation  Call [phone]. OPTION 2.              JAYESH AMOR MD               [id number]  [id number]  [id number]  [id number]^GABY^PAYAL^BIPIN     [facility]ch[facility]  [address]  [place] [address]  Phone [phone]  Fax [phone]     Name: JUSTIN BELLAJR.  MRN: [id number]  : 1935  Study Date: 2021 12:45 PM  Age: 86 yrs  Gender: Male  Patient Location: [facility]  Reason For Study: Pulmonary hypertension (H)  Ordering Physician: PAYAL GRIFFIN  Referring Physician: PAYAL GRIFFIN  Performed By: HOWIE Peacock     BSA: 1.7 m2  Height: 67 in  Weight: 141 lb  HR: 78  BP: 137/79 mmHg  ______________________________________________________________________________  Procedure  Complete Echo Adult.     ______________________________________________________________________________  Interpretation Summary     There is moderate concentric left ventricular hypertrophy.  The visual ejection fraction is 60-65%.  The right ventricle is normal in structure, function and size.  The left atrium is mild to moderately dilated.  There is mild to moderate (1-2+) mitral regurgitation.  There is mild (1+) aortic regurgitation.  Mild aortic root dilatation.  Compared to previous study findings are similar  ______________________________________________________________________________  Left Ventricle  The left ventricle is normal in size. There is moderate concentric  left  ventricular hypertrophy. Left ventricular systolic function is normal. The  visual ejection fraction is 60-65%. Grade II or moderate diastolic  dysfunction. No regional wall motion abnormalities noted.     Right Ventricle  The right ventricle is normal in structure, function and size.     Atria  The left atrium is mild to moderately dilated. Right atrial size is normal.  There is no color Doppler evidence of an atrial shunt.     Mitral Valve  The mitral valve leaflets appear thickened, but open well. Mild mitral valve  prolapse, bileaflet. There is mild to moderate (1-2+) mitral regurgitation.  The mitral regurgitant jet is eccentrically directed.     Tricuspid Valve  The tricuspid valve is normal in structure and function. There is mild (1+)  tricuspid regurgitation. The right ventricular systolic pressure is  approximated at 29.3 mmHg plus the right atrial pressure. Right ventricular  systolic pressure is elevated, consistent with mild pulmonary hypertension.     Aortic Valve  There is mild trileaflet aortic sclerosis. There is mild (1+) aortic  regurgitation.     Pulmonic Valve  The pulmonic valve is not well visualized. There is no pulmonic valvular  regurgitation.     Vessels  Mild aortic root dilatation. The inferior vena cava is normal.     Pericardium  There is no pericardial effusion.     Rhythm  Sinus rhythm was noted.     ______________________________________________________________________________  MMode/2D Measurements & Calculations  IVSd: 1.4 cm  LVIDd: 4.4 cm  LVIDs: 2.0 cm  LVPWd: 1.5 cm  FS: 54.4 %  LV mass(C)d: 255.1 grams  LV mass(C)dI: 146.3 grams/m2  Ao root diam: 4.0 cm     asc Aorta Diam: 3.3 cm  LVOT diam: 2.0 cm  LVOT area: 3.3 cm2  LA Volume Indexed (AL/bp): 36.3 ml/m2  RWT: 0.67     Doppler Measurements & Calculations  MV E max jayda: 75.1 cm/sec  MV A max jayda: 88.4 cm/sec  MV E/A: 0.85  MV dec time: 0.16 sec  AI P1/2t: 385.0 msec  PA acc time: 0.15 sec     TR max jayda: 270.5  cm/sec  TR max P.3 mmHg  Pulm Sys Prabhu: 62.8 cm/sec  Pulm Benedict Prabhu: 36.6 cm/sec  Pulm A Revs Prabhu: 35.2 cm/sec  Pulm S/D: 1.7  E/E': 14.9  Peak E' Prabhu: 5.0 cm/sec     ______________________________________________________________________________  Report approved by: Sneha Shetty 2021 02:20 PM              US THYROID 2020 1:16 PM     CLINICAL HISTORY: Unintentional weight loss. Thyroid nodule.  TECHNIQUE: Thyroid ultrasound.      COMPARISON: None.      FINDINGS:  RIGHT lobe: 5.9 x 1.2 x 2.5 cm. Heterogenous echotexture.  Isthmus: 5 mm.  LEFT lobe: 6.5 x 1.7 x 3.1 cm. Heterogenous echotexture.     NODULES:     Nodule 1: 3.7 x 3.7 x 3.1 cm nodule in the inferior left lobe   Composition: Solid or almost completely solid, 2 points   Echogenicity: Hyperechoic or isoechoic, 1 point   Shape: Not taller than wide, 0 points   Margin: Smooth, 0 points   Echogenic Foci: Punctate echoic foci, 3 points   Point Total: 4-6 points. TI-RADS 4. If 1.5 cm or larger, recommend  FNA; if 1 cm or larger, follow up US (annually for 5 years).                                                                          IMPRESSION: One indeterminant thyroid nodule in the inferior left  lobe.     Nodules are characterized per  ACR Thyroid Imaging, Reporting and Data System (TI-RADS): White Paper  of the ACR TI-RADS Committee  Ed Goldsmith. et al. Journal of the American College of  Radiology 2017. Volume 14 (2017), Issue 5, 529-979.      PHI SCOTT MD      NUCLEAR MEDICINE THYROID UPTAKE/SCAN  2020 2:08 PM     HISTORY: Prior deep vein thrombosis/pulmonary embolism. Incidentally  discovered thyroid nodule, evaluate for hot versus cold thyroid  nodule. Unintentional weight loss. Thyroid nodule.     DOSE: 247 uCi I-123 po on 2020      FINDINGS:    Thyroid 24-hour total uptake: 22% (normal = 10 to 30%).  Differential uptake on right: 10%  Differential uptake on left: 12%.     Thyroid gland computer  estimated total weight: 95.3 gm.  Right gland estimate: 54.5 gm.  Left gland estimate: 40.8 gm.     The thyroid scan appears within normal limits with no apparent hot or  cold nodules.                                                                       IMPRESSION: Unremarkable thyroid uptake and scan.     PHI SCOTT MD     A/P:              (I82.422) First lifetime thromboembolic event of left lower extremity DVT and bilateral PE s/p venogram with left common iliac vein stenting 12/8/19  Comment: His 12/2/22 d dimer was normal, down from 9.2. His duplex revealed persistent chronic-appearing occlusive thrombus throughout the left femoral vein, and nonocclusive thrombus in the central portion of the posterior tibial vein. He had mild to moderate MR, mild TR, normal LVEF, mild to moderate pulmonary htn. He was tolerating AC without clinically significant  bruising or bleeding, so we elected to continue Xarleto due to pulmonary htn. Recent recheck d dimer, LLE duplex, TTE in 12/2022 revealed a normal d dimer, unchanged chronic DVT, and resolution of previous mild pulmonary htn, so we stopped Xarelto AC when he ran out of the drug around 1/15/23, and checked D dimer quantitative off of AC on 2/6/23. That d dimer was normal for age at 0.81. Given that d dimer was normal for age,we had him continue to remain off of AC, but monitored d dimer on 3/3/23 given that it did numerically increase. That value was lower and also normal for age at 0.67. He RTC today to discuss results. On 3/7/23, I stated he should stay off of AC, recheck d dimer in three months, and that if that value is normal for age, that we should stop checking d dimer and have pt RTC prn. That value is however mildly elevated.  Plan: I discussed options with the patient. His values are normal for age now. We could either keep him off of AC and repeat the d dimer in one month or simply resume Xarelto at the chronic treatment dose of 10 mg daily, or stop  checking d dimer entirely. He opts to stay off of Xarelto. Stop checking d dimer. He leaves for [place] on 7/26[place]23.I will have him use 10 mg Xarelto the day before, day of , and day after [place] flight.          21 minutes total medical care on today's date.

## 2024-01-08 NOTE — PROGRESS NOTES
"[facility]      (Lab 7/13) f/u to 6/14 OV    Pt is currently taking no meds that would impact our treatment plan.    /79 (BP Location: Right arm)   Pulse 80   Ht 5' 7\" (1.702 m)   Wt 139 lb (63 kg)   SpO2 98%   BMI 21.77 kg/m      The provider has been notified that the patient has no concerns.     Refills are needed: No    Has homecare services and agency name:  No             " Attending to bill Attending to bill Attending to bill Attending to bill Attending to bill Attending to bill Attending to bill Attending to bill Attending to bill Attending to bill Attending to bill Attending to bill Attending to bill Attending to bill Attending to bill Attending to bill Attending to bill Attending to bill Attending to bill Attending to bill Attending to bill

## 2025-05-05 ENCOUNTER — ANESTHESIA (OUTPATIENT)
Dept: GASTROENTEROLOGY | Facility: CLINIC | Age: 89
End: 2025-05-05
Payer: COMMERCIAL

## 2025-05-05 ENCOUNTER — ANESTHESIA EVENT (OUTPATIENT)
Dept: GASTROENTEROLOGY | Facility: CLINIC | Age: 89
End: 2025-05-05
Payer: COMMERCIAL

## 2025-05-05 ENCOUNTER — HOSPITAL ENCOUNTER (OUTPATIENT)
Facility: CLINIC | Age: 89
Discharge: HOME | End: 2025-05-05
Attending: INTERNAL MEDICINE | Admitting: INTERNAL MEDICINE
Payer: COMMERCIAL

## 2025-05-05 VITALS
HEART RATE: 62 BPM | BODY MASS INDEX: 21.82 KG/M2 | HEIGHT: 67 IN | DIASTOLIC BLOOD PRESSURE: 71 MMHG | WEIGHT: 139 LBS | OXYGEN SATURATION: 98 % | RESPIRATION RATE: 25 BRPM | SYSTOLIC BLOOD PRESSURE: 137 MMHG

## 2025-05-05 LAB — COLONOSCOPY: NORMAL

## 2025-05-05 PROCEDURE — 45380 COLONOSCOPY AND BIOPSY: CPT | Performed by: INTERNAL MEDICINE

## 2025-05-05 PROCEDURE — 45385 COLONOSCOPY W/LESION REMOVAL: CPT | Mod: PT | Performed by: INTERNAL MEDICINE

## 2025-05-05 PROCEDURE — 258N000003 HC RX IP 258 OP 636: Performed by: NURSE ANESTHETIST, CERTIFIED REGISTERED

## 2025-05-05 PROCEDURE — 45381 COLONOSCOPY SUBMUCOUS NJX: CPT | Performed by: INTERNAL MEDICINE

## 2025-05-05 PROCEDURE — 999N000010 HC STATISTIC ANES STAT CODE-CRNA PER MINUTE: Performed by: INTERNAL MEDICINE

## 2025-05-05 PROCEDURE — 370N000017 HC ANESTHESIA TECHNICAL FEE, PER MIN: Performed by: INTERNAL MEDICINE

## 2025-05-05 PROCEDURE — 250N000011 HC RX IP 250 OP 636: Performed by: NURSE ANESTHETIST, CERTIFIED REGISTERED

## 2025-05-05 PROCEDURE — 88305 TISSUE EXAM BY PATHOLOGIST: CPT | Mod: TC | Performed by: INTERNAL MEDICINE

## 2025-05-05 RX ORDER — SODIUM CHLORIDE, SODIUM LACTATE, POTASSIUM CHLORIDE, CALCIUM CHLORIDE 600; 310; 30; 20 MG/100ML; MG/100ML; MG/100ML; MG/100ML
INJECTION, SOLUTION INTRAVENOUS CONTINUOUS PRN
Status: DISCONTINUED | OUTPATIENT
Start: 2025-05-05 | End: 2025-05-05

## 2025-05-05 RX ORDER — NALOXONE HYDROCHLORIDE 0.4 MG/ML
0.4 INJECTION, SOLUTION INTRAMUSCULAR; INTRAVENOUS; SUBCUTANEOUS
Status: DISCONTINUED | OUTPATIENT
Start: 2025-05-05 | End: 2025-05-05 | Stop reason: HOSPADM

## 2025-05-05 RX ORDER — NALOXONE HYDROCHLORIDE 0.4 MG/ML
0.2 INJECTION, SOLUTION INTRAMUSCULAR; INTRAVENOUS; SUBCUTANEOUS
Status: DISCONTINUED | OUTPATIENT
Start: 2025-05-05 | End: 2025-05-05 | Stop reason: HOSPADM

## 2025-05-05 RX ORDER — LIDOCAINE 40 MG/G
CREAM TOPICAL
Status: DISCONTINUED | OUTPATIENT
Start: 2025-05-05 | End: 2025-05-05 | Stop reason: HOSPADM

## 2025-05-05 RX ORDER — ONDANSETRON 4 MG/1
4 TABLET, ORALLY DISINTEGRATING ORAL EVERY 6 HOURS PRN
Status: DISCONTINUED | OUTPATIENT
Start: 2025-05-05 | End: 2025-05-05 | Stop reason: HOSPADM

## 2025-05-05 RX ORDER — ONDANSETRON 2 MG/ML
4 INJECTION INTRAMUSCULAR; INTRAVENOUS EVERY 6 HOURS PRN
Status: DISCONTINUED | OUTPATIENT
Start: 2025-05-05 | End: 2025-05-05 | Stop reason: HOSPADM

## 2025-05-05 RX ORDER — PROCHLORPERAZINE MALEATE 5 MG/1
5 TABLET ORAL EVERY 6 HOURS PRN
Status: DISCONTINUED | OUTPATIENT
Start: 2025-05-05 | End: 2025-05-05 | Stop reason: HOSPADM

## 2025-05-05 RX ORDER — ONDANSETRON 2 MG/ML
4 INJECTION INTRAMUSCULAR; INTRAVENOUS
Status: DISCONTINUED | OUTPATIENT
Start: 2025-05-05 | End: 2025-05-05 | Stop reason: HOSPADM

## 2025-05-05 RX ORDER — FLUMAZENIL 0.1 MG/ML
0.2 INJECTION, SOLUTION INTRAVENOUS
Status: DISCONTINUED | OUTPATIENT
Start: 2025-05-05 | End: 2025-05-05 | Stop reason: HOSPADM

## 2025-05-05 RX ORDER — PROPOFOL 10 MG/ML
INJECTION, EMULSION INTRAVENOUS CONTINUOUS PRN
Status: DISCONTINUED | OUTPATIENT
Start: 2025-05-05 | End: 2025-05-05

## 2025-05-05 RX ADMIN — PROPOFOL 20 MG: 10 INJECTION, EMULSION INTRAVENOUS at 11:22

## 2025-05-05 RX ADMIN — PROPOFOL 150 MCG/KG/MIN: 10 INJECTION, EMULSION INTRAVENOUS at 11:23

## 2025-05-05 RX ADMIN — SODIUM CHLORIDE, SODIUM LACTATE, POTASSIUM CHLORIDE, AND CALCIUM CHLORIDE: .6; .31; .03; .02 INJECTION, SOLUTION INTRAVENOUS at 11:21

## 2025-05-05 ASSESSMENT — ACTIVITIES OF DAILY LIVING (ADL)
ADLS_ACUITY_SCORE: 47
ADLS_ACUITY_SCORE: 47

## 2025-05-05 NOTE — ANESTHESIA CARE TRANSFER NOTE
Patient: Alec Boston Jr.    Procedure: Procedure(s):  Colonoscopy  TATTOOING, WITH COLONOSCOPY  COLONOSCOPY, FLEXIBLE, WITH LESION REMOVAL USING SNARE       Diagnosis: Positive FIT (fecal immunochemical test) [R19.5]  Diagnosis Additional Information: No value filed.    Anesthesia Type:   MAC     Note:    Oropharynx: oropharynx clear of all foreign objects  Level of Consciousness: drowsy  Oxygen Supplementation: room air    Independent Airway: airway patency satisfactory and stable  Dentition: dentition unchanged  Vital Signs Stable: post-procedure vital signs reviewed and stable  Report to RN Given: handoff report given            Vitals:  Vitals Value Taken Time   BP 90/53 05/05/25 1148   Temp 98.6f    Pulse 64 05/05/25 1148   Resp 28 05/05/25 1149   SpO2 96 % 05/05/25 1149   Vitals shown include unfiled device data.    Electronically Signed By: AMNA Begum CRNA  May 5, 2025  11:51 AM

## 2025-05-05 NOTE — ANESTHESIA PREPROCEDURE EVALUATION
Anesthesia Pre-Procedure Evaluation    Patient: Alec Boston Jr.   MRN: [id number] : 1935        Procedure : Procedure(s):  Colonoscopy          Past Medical History:   Diagnosis Date    Cholelithiasis     Glaucoma     Testicular cancer (H)       Past Surgical History:   Procedure Laterality Date    HC LAP,ORCHIECTOMY      INCISION LIMBAL RELAXING, KERATOTOMY ARCUATE  2013    Procedure: INCISION LIMBAL RELAXING, KERATOTOMY ARCUATE;;  Surgeon: Scott Cruz MD;  Location: [facility]    IR LOWER EXTREMITY VENOGRAM LEFT  2019    jaw orif[      LAPAROSCOPIC CHOLECYSTECTOMY      LASER SELECTIVE TRABECULOPLASTY Left 2016    Procedure: LASER SELECTIVE TRABECULOPLASTY;  Surgeon: Scott Cruz MD;  Location: [facility]    PHACOEMULSIFICATION CLEAR CORNEA WITH STANDARD INTRAOCULAR LENS IMPLANT  2013    Procedure: PHACOEMULSIFICATION CLEAR CORNEA WITH STANDARD INTRAOCULAR LENS IMPLANT;  LEFT PHACOEMULSIFICATION CLEAR CORNEA WITH STANDARD INTRAOCULAR LENS IMPLANT, WITH LIMBAL RELAXING INCISION;  Surgeon: Scott Cruz MD;  Location: [facility]      No Known Allergies   Social History     Tobacco Use    Smoking status: Never    Smokeless tobacco: Never   Substance Use Topics    Alcohol use: Yes     Comment: OCC      Wt Readings from Last 1 Encounters:   23 63 kg (139 lb)        Anesthesia Evaluation            ROS/MED HX  ENT/Pulmonary:       Neurologic:       Cardiovascular:     (+)  - -   -  - -                           valvular problems/murmurs type: MR    pulmonary hypertension, Previous cardiac testing   Echo: Date: 2022 Results:  1. Left ventricular systolic function is normal. The visual ejection fraction  is 60-65%.  2. No regional wall motion abnormalities noted.  3. The right ventricle is normal in structure, function and size.  4. There is mild bileaflet mitral valve prolapse. The mitral regurgitant jet  is posteriorly directed, which is consistent with anterior leaflet  "pathology.  There is at least moderate (2+) mitral regurgitation; eccentric jet may  underestimate the degree of insufficiency.  5. In comparison with the prior study, there has been no significant change.    Stress Test:  Date: Results:    ECG Reviewed:  Date: Results:    Cath:  Date: Results:      METS/Exercise Tolerance:     Hematologic:     (+) History of blood clots,               Musculoskeletal:   (+)  arthritis (inflammatory),             GI/Hepatic:       Renal/Genitourinary:       Endo:    (-) Type II DM and obesity   Psychiatric/Substance Use:       Infectious Disease:       Malignancy:   (+) Malignancy, History of Other.Other CA testicular status post.    Other:            Physical Exam    Airway        Mallampati: II   TM distance: > 3 FB   Neck ROM: full   Mouth opening: > 3 cm    Respiratory Devices and Support         Dental       (+) Minor Abnormalities - some fillings, tiny chips      Cardiovascular   cardiovascular exam normal          Pulmonary   pulmonary exam normal                OUTSIDE LABS:  CBC:   Lab Results   Component Value Date    WBC 10.7 12/08/2019    WBC 8.6 12/07/2019    HGB 12.4 (L) 12/08/2019    HGB 13.3 12/07/2019    HCT 37.5 (L) 12/08/2019    HCT 39.1 (L) 12/07/2019     (L) 12/08/2019     12/07/2019     BMP:   Lab Results   Component Value Date     12/07/2019     01/24/2009    POTASSIUM 4.2 12/07/2019    POTASSIUM 3.7 01/24/2009    CHLORIDE 104 12/07/2019    CHLORIDE 98 01/24/2009    CO2 25 12/07/2019    CO2 27 01/24/2009    BUN 23 12/07/2019    BUN 15 01/24/2009    CR 1.13 12/07/2019    CR 1.13 01/24/2009    GLC 99 12/07/2019     (H) 01/24/2009     COAGS:   Lab Results   Component Value Date    PTT 88 (H) 12/09/2019    INR 0.97 12/07/2019     POC: No results found for: \"BGM\", \"HCG\", \"HCGS\"  HEPATIC:   Lab Results   Component Value Date    ALBUMIN 3.7 12/07/2019    PROTTOTAL 8.0 12/07/2019    ALT 23 12/07/2019    AST 11 12/07/2019    ALKPHOS 48 " 12/07/2019    BILITOTAL 0.9 12/07/2019     OTHER:   Lab Results   Component Value Date    HOWIE 9.3 12/07/2019    LIPASE 191 12/07/2019    TSH 1.06 01/04/2021    T4 0.87 01/04/2021       Anesthesia Plan    ASA Status:  3    NPO Status:  NPO Appropriate    Anesthesia Type: MAC.     - Reason for MAC: chronic cardiopulmonary disease              Consents    Anesthesia Plan(s) and associated risks, benefits, and realistic alternatives discussed. Questions answered and patient/representative(s) expressed understanding.     - Discussed:     - Discussed with:  Patient            Postoperative Care       PONV prophylaxis: Background Propofol Infusion     Comments:               Jeremy Leonard MD    Clinically Significant Risk Factors Present on Admission

## 2025-05-05 NOTE — ANESTHESIA POSTPROCEDURE EVALUATION
Patient: Alec Boston Jr.    Procedure: Procedure(s):  Colonoscopy  TATTOOING, WITH COLONOSCOPY  COLONOSCOPY, FLEXIBLE, WITH LESION REMOVAL USING SNARE       Anesthesia Type:  MAC    Note:  Disposition: Outpatient   Postop Pain Control: Uneventful            Sign Out: Well controlled pain   PONV: No   Neuro/Psych: Uneventful            Sign Out: Acceptable/Baseline neuro status   Airway/Respiratory: Uneventful            Sign Out: Acceptable/Baseline resp. status   CV/Hemodynamics: Uneventful            Sign Out: Acceptable CV status   Other NRE: NONE   DID A NON-ROUTINE EVENT OCCUR?            Last vitals:  Vitals Value Taken Time   /71 05/05/25 1220   Temp     Pulse 61 05/05/25 1221   Resp 14 05/05/25 1221   SpO2 97 % 05/05/25 1221   Vitals shown include unfiled device data.    Electronically Signed By: Jeremy Leonard MD  May 5, 2025  12:34 PM

## 2025-05-07 LAB
PATH REPORT.COMMENTS IMP SPEC: NORMAL
PATH REPORT.FINAL DX SPEC: NORMAL
PATH REPORT.GROSS SPEC: NORMAL
PATH REPORT.MICROSCOPIC SPEC OTHER STN: NORMAL
PATH REPORT.RELEVANT HX SPEC: NORMAL
PHOTO IMAGE: NORMAL

## 2025-05-07 PROCEDURE — 88305 TISSUE EXAM BY PATHOLOGIST: CPT | Mod: 26 | Performed by: PATHOLOGY
